# Patient Record
Sex: FEMALE | Race: OTHER | HISPANIC OR LATINO
[De-identification: names, ages, dates, MRNs, and addresses within clinical notes are randomized per-mention and may not be internally consistent; named-entity substitution may affect disease eponyms.]

---

## 2017-01-04 ENCOUNTER — APPOINTMENT (OUTPATIENT)
Dept: FAMILY MEDICINE | Facility: CLINIC | Age: 82
End: 2017-01-04

## 2017-01-04 VITALS
TEMPERATURE: 98.3 F | HEART RATE: 63 BPM | WEIGHT: 120 LBS | BODY MASS INDEX: 25.89 KG/M2 | OXYGEN SATURATION: 98 % | SYSTOLIC BLOOD PRESSURE: 179 MMHG | HEIGHT: 57 IN | DIASTOLIC BLOOD PRESSURE: 82 MMHG

## 2017-01-04 VITALS — SYSTOLIC BLOOD PRESSURE: 160 MMHG | DIASTOLIC BLOOD PRESSURE: 90 MMHG

## 2017-01-04 DIAGNOSIS — R41.82 ALTERED MENTAL STATUS, UNSPECIFIED: ICD-10-CM

## 2017-01-04 DIAGNOSIS — Z01.00 ENCOUNTER FOR EXAMINATION OF EYES AND VISION W/OUT ABNORMAL FINDINGS: ICD-10-CM

## 2017-01-04 DIAGNOSIS — Z23 ENCOUNTER FOR IMMUNIZATION: ICD-10-CM

## 2017-01-04 DIAGNOSIS — I24.9 ACUTE ISCHEMIC HEART DISEASE, UNSPECIFIED: ICD-10-CM

## 2017-01-04 DIAGNOSIS — K21.9 GASTRO-ESOPHAGEAL REFLUX DISEASE W/OUT ESOPHAGITIS: ICD-10-CM

## 2017-01-04 DIAGNOSIS — E55.9 VITAMIN D DEFICIENCY, UNSPECIFIED: ICD-10-CM

## 2017-01-04 DIAGNOSIS — Z09 ENCOUNTER FOR FOLLOW-UP EXAMINATION AFTER COMPLETED TREATMENT FOR CONDITIONS OTHER THAN MALIGNANT NEOPLASM: ICD-10-CM

## 2017-01-04 DIAGNOSIS — E13.10 OTHER SPECIFIED DIABETES MELLITUS WITH KETOACIDOSIS W/OUT COMA: ICD-10-CM

## 2017-01-04 RX ORDER — ASPIRIN 325 MG/1
325 TABLET, FILM COATED ORAL DAILY
Qty: 30 | Refills: 5 | Status: ACTIVE | COMMUNITY
Start: 2017-01-04 | End: 1900-01-01

## 2017-01-06 PROBLEM — E55.9 VITAMIN D DEFICIENCY: Status: ACTIVE | Noted: 2017-01-06

## 2017-01-06 LAB
25(OH)D3 SERPL-MCNC: 13.3 NG/ML
ALBUMIN SERPL ELPH-MCNC: 3.9 G/DL
ALP BLD-CCNC: 92 U/L
ALT SERPL-CCNC: 18 U/L
ANION GAP SERPL CALC-SCNC: 20 MMOL/L
APPEARANCE: CLEAR
AST SERPL-CCNC: 18 U/L
BACTERIA: ABNORMAL
BASOPHILS # BLD AUTO: 0.03 K/UL
BASOPHILS NFR BLD AUTO: 0.5 %
BILIRUB SERPL-MCNC: 0.2 MG/DL
BILIRUBIN URINE: ABNORMAL
BLOOD URINE: NEGATIVE
BUN SERPL-MCNC: 18 MG/DL
CALCIUM SERPL-MCNC: 9.8 MG/DL
CHLORIDE SERPL-SCNC: 102 MMOL/L
CO2 SERPL-SCNC: 20 MMOL/L
COLOR: YELLOW
CREAT SERPL-MCNC: 1 MG/DL
CREAT SPEC-SCNC: 144 MG/DL
EOSINOPHIL # BLD AUTO: 0.04 K/UL
EOSINOPHIL NFR BLD AUTO: 0.7 %
FERRITIN SERPL-MCNC: 24.7 NG/ML
FOLATE SERPL-MCNC: 18.3 NG/ML
GGT SERPL-CCNC: 29 U/L
GLUCOSE QUALITATIVE U: NORMAL
GLUCOSE SERPL-MCNC: 140 MG/DL
HBA1C MFR BLD HPLC: 8.9 %
HCT VFR BLD CALC: 33.4 %
HGB BLD-MCNC: 10.9 G/DL
HYALINE CASTS: 0 /LPF
IMM GRANULOCYTES NFR BLD AUTO: 0.3 %
IRON SATN MFR SERPL: 25 %
IRON SERPL-MCNC: 90 UG/DL
KETONES URINE: ABNORMAL
LEUKOCYTE ESTERASE URINE: ABNORMAL
LYMPHOCYTES # BLD AUTO: 1.42 K/UL
LYMPHOCYTES NFR BLD AUTO: 24.1 %
MAN DIFF?: NORMAL
MCHC RBC-ENTMCNC: 26.3 PG
MCHC RBC-ENTMCNC: 32.6 GM/DL
MCV RBC AUTO: 80.5 FL
MICROALBUMIN 24H UR DL<=1MG/L-MCNC: 2.1 MG/DL
MICROALBUMIN/CREAT 24H UR-RTO: 15 UG/MG
MICROSCOPIC-UA: NORMAL
MONOCYTES # BLD AUTO: 0.21 K/UL
MONOCYTES NFR BLD AUTO: 3.6 %
NEUTROPHILS # BLD AUTO: 4.18 K/UL
NEUTROPHILS NFR BLD AUTO: 70.8 %
NITRITE URINE: NEGATIVE
PH URINE: 6
PLATELET # BLD AUTO: 374 K/UL
POTASSIUM SERPL-SCNC: 4.8 MMOL/L
PROT SERPL-MCNC: 7 G/DL
PROTEIN URINE: NEGATIVE
RBC # BLD: 4.15 M/UL
RBC # FLD: 14.4 %
RED BLOOD CELLS URINE: 2 /HPF
SODIUM SERPL-SCNC: 142 MMOL/L
SPECIFIC GRAVITY URINE: 1.02
SQUAMOUS EPITHELIAL CELLS: 6 /HPF
T4 FREE SERPL-MCNC: 1.4 NG/DL
TIBC SERPL-MCNC: 360 UG/DL
TSH SERPL-ACNC: 0.52 UU/ML
UIBC SERPL-MCNC: 270 UG/DL
UROBILINOGEN URINE: NORMAL
VIT B12 SERPL-MCNC: 361 PG/ML
WBC # FLD AUTO: 5.9 K/UL
WHITE BLOOD CELLS URINE: 7 /HPF

## 2017-01-25 ENCOUNTER — APPOINTMENT (OUTPATIENT)
Dept: FAMILY MEDICINE | Facility: CLINIC | Age: 82
End: 2017-01-25

## 2017-02-01 ENCOUNTER — APPOINTMENT (OUTPATIENT)
Dept: FAMILY MEDICINE | Facility: CLINIC | Age: 82
End: 2017-02-01

## 2017-02-01 VITALS
BODY MASS INDEX: 26.1 KG/M2 | HEIGHT: 57 IN | DIASTOLIC BLOOD PRESSURE: 73 MMHG | HEART RATE: 78 BPM | SYSTOLIC BLOOD PRESSURE: 166 MMHG | WEIGHT: 121 LBS | OXYGEN SATURATION: 99 % | TEMPERATURE: 99.3 F

## 2017-02-01 DIAGNOSIS — E11.65 TYPE 2 DIABETES MELLITUS WITH HYPERGLYCEMIA: ICD-10-CM

## 2017-02-01 DIAGNOSIS — M19.90 UNSPECIFIED OSTEOARTHRITIS, UNSPECIFIED SITE: ICD-10-CM

## 2017-02-02 LAB
CHOLEST SERPL-MCNC: 132 MG/DL
CHOLEST/HDLC SERPL: 2.2 RATIO
HDLC SERPL-MCNC: 61 MG/DL
LDLC SERPL CALC-MCNC: 57 MG/DL
TRIGL SERPL-MCNC: 70 MG/DL

## 2017-04-13 ENCOUNTER — APPOINTMENT (OUTPATIENT)
Dept: FAMILY MEDICINE | Facility: CLINIC | Age: 82
End: 2017-04-13

## 2017-05-03 ENCOUNTER — LABORATORY RESULT (OUTPATIENT)
Age: 82
End: 2017-05-03

## 2017-05-03 ENCOUNTER — APPOINTMENT (OUTPATIENT)
Dept: FAMILY MEDICINE | Facility: CLINIC | Age: 82
End: 2017-05-03

## 2017-05-03 VITALS
HEIGHT: 57 IN | WEIGHT: 121 LBS | DIASTOLIC BLOOD PRESSURE: 82 MMHG | BODY MASS INDEX: 26.1 KG/M2 | TEMPERATURE: 98.3 F | HEART RATE: 61 BPM | OXYGEN SATURATION: 99 % | SYSTOLIC BLOOD PRESSURE: 189 MMHG

## 2017-05-03 DIAGNOSIS — Z09 ENCOUNTER FOR FOLLOW-UP EXAMINATION AFTER COMPLETED TREATMENT FOR CONDITIONS OTHER THAN MALIGNANT NEOPLASM: ICD-10-CM

## 2017-05-03 LAB — HBA1C MFR BLD HPLC: 8.1

## 2017-05-04 LAB
ANION GAP SERPL CALC-SCNC: 15 MMOL/L
BASOPHILS # BLD AUTO: 0.08 K/UL
BASOPHILS NFR BLD AUTO: 0.9 %
BUN SERPL-MCNC: 16 MG/DL
CALCIUM SERPL-MCNC: 9.5 MG/DL
CHLORIDE SERPL-SCNC: 101 MMOL/L
CO2 SERPL-SCNC: 25 MMOL/L
CREAT SERPL-MCNC: 0.68 MG/DL
EOSINOPHIL # BLD AUTO: 0.36 K/UL
EOSINOPHIL NFR BLD AUTO: 4.3 %
GLUCOSE SERPL-MCNC: 155 MG/DL
HCT VFR BLD CALC: 30.6 %
HGB BLD-MCNC: 9.3 G/DL
LYMPHOCYTES # BLD AUTO: 1.62 K/UL
LYMPHOCYTES NFR BLD AUTO: 19.1 %
MAN DIFF?: NORMAL
MCHC RBC-ENTMCNC: 21.9 PG
MCHC RBC-ENTMCNC: 30.4 GM/DL
MCV RBC AUTO: 72 FL
MONOCYTES # BLD AUTO: 0.3 K/UL
MONOCYTES NFR BLD AUTO: 3.5 %
NEUTROPHILS # BLD AUTO: 6.12 K/UL
NEUTROPHILS NFR BLD AUTO: 72.2 %
PLATELET # BLD AUTO: 441 K/UL
POTASSIUM SERPL-SCNC: 4.6 MMOL/L
RBC # BLD: 4.25 M/UL
RBC # FLD: NORMAL
SODIUM SERPL-SCNC: 141 MMOL/L
WBC # FLD AUTO: 8.48 K/UL

## 2017-05-05 ENCOUNTER — APPOINTMENT (OUTPATIENT)
Dept: FAMILY MEDICINE | Facility: CLINIC | Age: 82
End: 2017-05-05

## 2017-07-20 ENCOUNTER — EMERGENCY (EMERGENCY)
Facility: HOSPITAL | Age: 82
LOS: 1 days | Discharge: HOME | End: 2017-07-20
Attending: EMERGENCY MEDICINE | Admitting: EMERGENCY MEDICINE
Payer: MEDICARE

## 2017-07-20 VITALS
DIASTOLIC BLOOD PRESSURE: 81 MMHG | HEART RATE: 62 BPM | TEMPERATURE: 98 F | OXYGEN SATURATION: 100 % | HEIGHT: 62 IN | SYSTOLIC BLOOD PRESSURE: 189 MMHG | RESPIRATION RATE: 18 BRPM | WEIGHT: 130.07 LBS

## 2017-07-20 VITALS
OXYGEN SATURATION: 100 % | DIASTOLIC BLOOD PRESSURE: 80 MMHG | HEART RATE: 60 BPM | SYSTOLIC BLOOD PRESSURE: 184 MMHG | RESPIRATION RATE: 18 BRPM

## 2017-07-20 LAB
ALBUMIN SERPL ELPH-MCNC: 4.3 G/DL — SIGNIFICANT CHANGE UP (ref 3.3–5.2)
ALP SERPL-CCNC: 78 U/L — SIGNIFICANT CHANGE UP (ref 40–120)
ALT FLD-CCNC: 15 U/L — SIGNIFICANT CHANGE UP
ANION GAP SERPL CALC-SCNC: 15 MMOL/L — SIGNIFICANT CHANGE UP (ref 5–17)
APPEARANCE UR: CLEAR — SIGNIFICANT CHANGE UP
AST SERPL-CCNC: 24 U/L — SIGNIFICANT CHANGE UP
BILIRUB SERPL-MCNC: 0.1 MG/DL — LOW (ref 0.4–2)
BILIRUB UR-MCNC: NEGATIVE — SIGNIFICANT CHANGE UP
BUN SERPL-MCNC: 8 MG/DL — SIGNIFICANT CHANGE UP (ref 8–20)
CALCIUM SERPL-MCNC: 8.9 MG/DL — SIGNIFICANT CHANGE UP (ref 8.6–10.2)
CHLORIDE SERPL-SCNC: 105 MMOL/L — SIGNIFICANT CHANGE UP (ref 98–107)
CO2 SERPL-SCNC: 22 MMOL/L — SIGNIFICANT CHANGE UP (ref 22–29)
COLOR SPEC: YELLOW — SIGNIFICANT CHANGE UP
CREAT SERPL-MCNC: 0.66 MG/DL — SIGNIFICANT CHANGE UP (ref 0.5–1.3)
DIFF PNL FLD: NEGATIVE — SIGNIFICANT CHANGE UP
GLUCOSE SERPL-MCNC: 63 MG/DL — LOW (ref 70–115)
GLUCOSE UR QL: NEGATIVE MG/DL — SIGNIFICANT CHANGE UP
HCT VFR BLD CALC: 30.4 % — LOW (ref 37–47)
HGB BLD-MCNC: 9.9 G/DL — LOW (ref 12–16)
KETONES UR-MCNC: NEGATIVE — SIGNIFICANT CHANGE UP
LEUKOCYTE ESTERASE UR-ACNC: NEGATIVE — SIGNIFICANT CHANGE UP
MCHC RBC-ENTMCNC: 24.9 PG — LOW (ref 27–31)
MCHC RBC-ENTMCNC: 32.6 G/DL — SIGNIFICANT CHANGE UP (ref 32–36)
MCV RBC AUTO: 76.6 FL — LOW (ref 81–99)
NITRITE UR-MCNC: NEGATIVE — SIGNIFICANT CHANGE UP
PH UR: 6.5 — SIGNIFICANT CHANGE UP (ref 5–8)
PLATELET # BLD AUTO: 342 K/UL — SIGNIFICANT CHANGE UP (ref 150–400)
POTASSIUM SERPL-MCNC: 4 MMOL/L — SIGNIFICANT CHANGE UP (ref 3.5–5.3)
POTASSIUM SERPL-SCNC: 4 MMOL/L — SIGNIFICANT CHANGE UP (ref 3.5–5.3)
PROT SERPL-MCNC: 7.1 G/DL — SIGNIFICANT CHANGE UP (ref 6.6–8.7)
PROT UR-MCNC: NEGATIVE MG/DL — SIGNIFICANT CHANGE UP
RBC # BLD: 3.97 M/UL — LOW (ref 4.4–5.2)
RBC # FLD: 16.9 % — HIGH (ref 11–15.6)
SODIUM SERPL-SCNC: 142 MMOL/L — SIGNIFICANT CHANGE UP (ref 135–145)
SP GR SPEC: 1 — LOW (ref 1.01–1.02)
UROBILINOGEN FLD QL: NEGATIVE MG/DL — SIGNIFICANT CHANGE UP
WBC # BLD: 6.1 K/UL — SIGNIFICANT CHANGE UP (ref 4.8–10.8)
WBC # FLD AUTO: 6.1 K/UL — SIGNIFICANT CHANGE UP (ref 4.8–10.8)

## 2017-07-20 PROCEDURE — 99284 EMERGENCY DEPT VISIT MOD MDM: CPT

## 2017-07-20 PROCEDURE — 36415 COLL VENOUS BLD VENIPUNCTURE: CPT

## 2017-07-20 PROCEDURE — T1013: CPT

## 2017-07-20 PROCEDURE — 93010 ELECTROCARDIOGRAM REPORT: CPT

## 2017-07-20 PROCEDURE — 93005 ELECTROCARDIOGRAM TRACING: CPT

## 2017-07-20 PROCEDURE — 81003 URINALYSIS AUTO W/O SCOPE: CPT

## 2017-07-20 PROCEDURE — 85027 COMPLETE CBC AUTOMATED: CPT

## 2017-07-20 PROCEDURE — 80053 COMPREHEN METABOLIC PANEL: CPT

## 2017-07-20 PROCEDURE — 99283 EMERGENCY DEPT VISIT LOW MDM: CPT | Mod: 25

## 2017-07-20 RX ADMIN — Medication 0.1 MILLIGRAM(S): at 13:14

## 2017-07-20 NOTE — ED ADULT NURSE REASSESSMENT NOTE - NS ED NURSE REASSESS COMMENT FT1
pt in wheelchair  family at bedside  son states 'I am the only one who would know her medicine but she takes like 200 pills with all funky names and I don't know any of them'

## 2017-07-20 NOTE — ED STATDOCS - PROGRESS NOTE DETAILS
PA NOTE:  Pt seen by intake physician and HPI/ROS/PE/MDM reviewed. PT presenting to ED with complaints of...  PE: GEN: Awake, alert, interactive, NAD, non-toxic appearing. EYES: PERRL CARDIAC: Reg rate and rhythm, S1,S2, no murmur/rub/gallop. Strong central and peripheral pulses, Brisk cap refill, no evident pedal edema. RESP: No distress noted. L/S clear = Bilat without accessory muscle use, wheeze, rhonchi, rales. ABD: soft, supple, non-tender, no guarding. BS x 4, normoactive. NEURO: AOx3, CN II-XII grossly intact without focal deficit. MSK: Moving all extremities with no apparent deformities. SKIN: Warm, dry, normal color, without apparent rashes.  PLAN: Pt to be discharged to home. Advised DASH diet is recommended. Advised follow up with your PMD within 2 days for blood pressure check. Return to ED w headache, dizziness, chest pain, sob, palpitations or other worrisome symptoms. Explained w son at bedside. Expresses understanding and agreement with plan of discharge and follow up. PA NOTE:  Pt seen by intake physician and HPI/ROS/PE/MDM reviewed. PT presenting to ED with complaints of..elevated blood pressure. Pt has a home health aide who brought her in due to systolic pressure of 200. Pt normally takes Metoprolol daily. Pt feels well, denies pain or any symptoms.   PE: GEN: Awake, alert, interactive, NAD, non-toxic appearing. EYES: PERRL CARDIAC: Reg rate and rhythm, S1,S2, no murmur/rub/gallop. Strong central and peripheral pulses, Brisk cap refill, no evident pedal edema. RESP: No distress noted. L/S clear = Bilat without accessory muscle use, wheeze, rhonchi, rales. ABD: soft, supple, non-tender, no guarding. BS x 4, normoactive. NEURO: AOx3, CN II-XII grossly intact without focal deficit. MSK: Moving all extremities with no apparent deformities. SKIN: Warm, dry, normal color, without apparent rashes.  PLAN: Clonidine, EKG, labs, reassess Informed all results, informed mild anemia with no baseline labs. Printout given to give to PMD. Pt to be discharged to home. Advised DASH diet is recommended. Advised follow up with your PMD within 2 days for blood pressure check. Return to ED w headache, dizziness, chest pain, sob, palpitations or other worrisome symptoms. Explained w son at bedside. Expresses understanding and agreement with plan of discharge and follow up.

## 2017-07-20 NOTE — ED STATDOCS - CARE PLAN
Principal Discharge DX:	Hypertension  Goal:	to have well controlled BP  Instructions for follow-up, activity and diet:	DASH diet is recommended. Follow up with your PMD within 2 days for blood pressure check. Return to ED w headache, dizziness, chest pain, sob, palpitations or other worrisome symptoms.

## 2017-07-20 NOTE — ED STATDOCS - OBJECTIVE STATEMENT
81 y/o F PMHx DM presents to ED c/o HTN. Pt has a nurse for home care, nurse told her BP was over 200. Pt reports she feels well, sometimes she feels difficulty breathing. Pt is on Metoprolol, ASA, and a few more medications which she takes daily.  Denies N/V/D, fever, chills, SOB, CP, HA, numbness, tingling and abd pain.  PCP: Anitra Figueroa.

## 2017-07-20 NOTE — ED STATDOCS - PLAN OF CARE
to have well controlled BP DASH diet is recommended. Follow up with your PMD within 2 days for blood pressure check. Return to ED w headache, dizziness, chest pain, sob, palpitations or other worrisome symptoms.

## 2017-07-20 NOTE — ED ADULT NURSE REASSESSMENT NOTE - NS ED NURSE REASSESS COMMENT FT1
call out to pts MD  [phone]  son couldn't recall doctors name 'I have the number'     called to get med list.

## 2017-07-20 NOTE — ED ADULT TRIAGE NOTE - CHIEF COMPLAINT QUOTE
Visiting nurse stated blood pressure was high, denies any headache or dizziness, offers no complaints

## 2017-07-20 NOTE — ED STATDOCS - ATTENDING CONTRIBUTION TO CARE
I, Axel Carmichael, performed the initial face to face bedside interview with this patient regarding history of present illness, review of symptoms and relevant past medical, social and family history.  I completed an independent physical examination.  I was the initial provider who evaluated this patient. I have signed out the follow up of any pending tests (i.e. labs, radiological studies) to the ACP.  I have communicated the patient’s plan of care and disposition with the ACP.

## 2017-07-20 NOTE — ED STATDOCS - MEDICAL DECISION MAKING DETAILS
Will get basic labs, and re-assess BP. Will add additional medications as necessary and likely discharge given asymptomatic.

## 2017-07-26 ENCOUNTER — APPOINTMENT (OUTPATIENT)
Dept: FAMILY MEDICINE | Facility: CLINIC | Age: 82
End: 2017-07-26

## 2017-07-26 VITALS
HEART RATE: 62 BPM | WEIGHT: 125 LBS | OXYGEN SATURATION: 100 % | SYSTOLIC BLOOD PRESSURE: 160 MMHG | TEMPERATURE: 98 F | BODY MASS INDEX: 26.97 KG/M2 | DIASTOLIC BLOOD PRESSURE: 70 MMHG | HEIGHT: 57 IN

## 2017-07-26 VITALS — SYSTOLIC BLOOD PRESSURE: 130 MMHG | DIASTOLIC BLOOD PRESSURE: 80 MMHG

## 2017-07-26 DIAGNOSIS — I25.10 ATHEROSCLEROTIC HEART DISEASE OF NATIVE CORONARY ARTERY W/OUT ANGINA PECTORIS: ICD-10-CM

## 2017-07-26 DIAGNOSIS — D64.9 ANEMIA, UNSPECIFIED: ICD-10-CM

## 2017-07-26 LAB — HBA1C MFR BLD HPLC: 6.9

## 2017-07-26 RX ORDER — GLIPIZIDE 10 MG/1
10 TABLET ORAL
Qty: 60 | Refills: 5 | Status: DISCONTINUED | COMMUNITY
Start: 2017-01-04 | End: 2017-07-26

## 2017-08-11 ENCOUNTER — APPOINTMENT (OUTPATIENT)
Dept: FAMILY MEDICINE | Facility: CLINIC | Age: 82
End: 2017-08-11
Payer: MEDICARE

## 2017-08-11 VITALS
HEIGHT: 57 IN | DIASTOLIC BLOOD PRESSURE: 72 MMHG | BODY MASS INDEX: 27.18 KG/M2 | TEMPERATURE: 98.3 F | HEART RATE: 60 BPM | OXYGEN SATURATION: 100 % | WEIGHT: 126 LBS | SYSTOLIC BLOOD PRESSURE: 174 MMHG

## 2017-08-11 DIAGNOSIS — I10 ESSENTIAL (PRIMARY) HYPERTENSION: ICD-10-CM

## 2017-08-11 DIAGNOSIS — E11.9 TYPE 2 DIABETES MELLITUS W/OUT COMPLICATIONS: ICD-10-CM

## 2017-08-11 PROCEDURE — 99213 OFFICE O/P EST LOW 20 MIN: CPT

## 2017-08-30 ENCOUNTER — LABORATORY RESULT (OUTPATIENT)
Age: 82
End: 2017-08-30

## 2017-08-30 ENCOUNTER — APPOINTMENT (OUTPATIENT)
Dept: FAMILY MEDICINE | Facility: CLINIC | Age: 82
End: 2017-08-30
Payer: MEDICARE

## 2017-08-30 VITALS
HEART RATE: 60 BPM | OXYGEN SATURATION: 100 % | DIASTOLIC BLOOD PRESSURE: 73 MMHG | SYSTOLIC BLOOD PRESSURE: 191 MMHG | HEIGHT: 57 IN | BODY MASS INDEX: 26.32 KG/M2 | WEIGHT: 122 LBS | TEMPERATURE: 99 F

## 2017-08-30 DIAGNOSIS — Z00.00 ENCOUNTER FOR GENERAL ADULT MEDICAL EXAMINATION W/OUT ABNORMAL FINDINGS: ICD-10-CM

## 2017-08-30 PROCEDURE — 36415 COLL VENOUS BLD VENIPUNCTURE: CPT

## 2017-08-30 PROCEDURE — 99397 PER PM REEVAL EST PAT 65+ YR: CPT | Mod: 25

## 2017-09-01 LAB
25(OH)D3 SERPL-MCNC: 14 NG/ML
ALBUMIN SERPL ELPH-MCNC: 4.1 G/DL
ALP BLD-CCNC: 73 U/L
ALT SERPL-CCNC: 21 U/L
ANION GAP SERPL CALC-SCNC: 14 MMOL/L
APPEARANCE: CLEAR
AST SERPL-CCNC: 22 U/L
BASOPHILS # BLD AUTO: 0.03 K/UL
BASOPHILS NFR BLD AUTO: 0.7 %
BILIRUB SERPL-MCNC: <0.2 MG/DL
BILIRUBIN URINE: NEGATIVE
BLOOD URINE: NEGATIVE
BUN SERPL-MCNC: 14 MG/DL
CALCIUM SERPL-MCNC: 9 MG/DL
CHLORIDE SERPL-SCNC: 103 MMOL/L
CHOLEST SERPL-MCNC: 126 MG/DL
CHOLEST/HDLC SERPL: 3.1 RATIO
CO2 SERPL-SCNC: 23 MMOL/L
COLOR: YELLOW
CREAT SERPL-MCNC: 0.77 MG/DL
CREAT SPEC-SCNC: 111 MG/DL
EOSINOPHIL # BLD AUTO: 0.09 K/UL
EOSINOPHIL NFR BLD AUTO: 2 %
GLUCOSE QUALITATIVE U: NORMAL MG/DL
GLUCOSE SERPL-MCNC: 166 MG/DL
HCT VFR BLD CALC: 27 %
HDLC SERPL-MCNC: 41 MG/DL
HGB BLD-MCNC: 8.5 G/DL
IMM GRANULOCYTES NFR BLD AUTO: 0.2 %
KETONES URINE: NEGATIVE
LDLC SERPL CALC-MCNC: 44 MG/DL
LEUKOCYTE ESTERASE URINE: NEGATIVE
LYMPHOCYTES # BLD AUTO: 1.12 K/UL
LYMPHOCYTES NFR BLD AUTO: 24.8 %
MAN DIFF?: NORMAL
MCHC RBC-ENTMCNC: 23.1 PG
MCHC RBC-ENTMCNC: 31.5 GM/DL
MCV RBC AUTO: 73.4 FL
MICROALBUMIN 24H UR DL<=1MG/L-MCNC: 1.9 MG/DL
MICROALBUMIN/CREAT 24H UR-RTO: 17 MG/G
MONOCYTES # BLD AUTO: 0.27 K/UL
MONOCYTES NFR BLD AUTO: 6 %
NEUTROPHILS # BLD AUTO: 2.99 K/UL
NEUTROPHILS NFR BLD AUTO: 66.3 %
NITRITE URINE: NEGATIVE
PH URINE: 7.5
PLATELET # BLD AUTO: 380 K/UL
POTASSIUM SERPL-SCNC: 4.5 MMOL/L
PROT SERPL-MCNC: 7.3 G/DL
PROTEIN URINE: NEGATIVE MG/DL
RBC # BLD: 3.68 M/UL
RBC # FLD: 17.8 %
SODIUM SERPL-SCNC: 140 MMOL/L
SPECIFIC GRAVITY URINE: 1.01
TRIGL SERPL-MCNC: 206 MG/DL
TSH SERPL-ACNC: 0.65 UIU/ML
UROBILINOGEN URINE: NORMAL MG/DL
WBC # FLD AUTO: 4.51 K/UL

## 2017-09-11 ENCOUNTER — RX RENEWAL (OUTPATIENT)
Age: 82
End: 2017-09-11

## 2017-09-11 RX ORDER — CLOPIDOGREL BISULFATE 75 MG/1
75 TABLET, FILM COATED ORAL DAILY
Qty: 30 | Refills: 3 | Status: ACTIVE | COMMUNITY
Start: 2017-01-04 | End: 1900-01-01

## 2017-09-11 RX ORDER — LOSARTAN POTASSIUM 50 MG/1
50 TABLET, FILM COATED ORAL DAILY
Qty: 30 | Refills: 5 | Status: ACTIVE | COMMUNITY
Start: 2017-01-04 | End: 1900-01-01

## 2017-09-11 RX ORDER — METOPROLOL TARTRATE 25 MG/1
25 TABLET, FILM COATED ORAL
Qty: 60 | Refills: 5 | Status: ACTIVE | COMMUNITY
Start: 2017-01-04 | End: 1900-01-01

## 2017-09-11 RX ORDER — ATORVASTATIN CALCIUM 20 MG/1
20 TABLET, FILM COATED ORAL DAILY
Qty: 30 | Refills: 5 | Status: ACTIVE | COMMUNITY
Start: 2017-01-04 | End: 1900-01-01

## 2017-09-11 RX ORDER — ERGOCALCIFEROL 1.25 MG/1
1.25 MG CAPSULE, LIQUID FILLED ORAL
Qty: 12 | Refills: 0 | Status: ACTIVE | COMMUNITY
Start: 2017-01-06 | End: 1900-01-01

## 2017-10-04 ENCOUNTER — APPOINTMENT (OUTPATIENT)
Dept: FAMILY MEDICINE | Facility: CLINIC | Age: 82
End: 2017-10-04

## 2017-11-25 ENCOUNTER — INPATIENT (INPATIENT)
Facility: HOSPITAL | Age: 82
LOS: 4 days | Discharge: HOME | DRG: 65 | End: 2017-11-30
Attending: INTERNAL MEDICINE | Admitting: INTERNAL MEDICINE
Payer: MEDICARE

## 2017-11-25 VITALS
TEMPERATURE: 98 F | RESPIRATION RATE: 16 BRPM | SYSTOLIC BLOOD PRESSURE: 216 MMHG | DIASTOLIC BLOOD PRESSURE: 91 MMHG | HEIGHT: 66 IN | OXYGEN SATURATION: 100 % | HEART RATE: 61 BPM | WEIGHT: 175.05 LBS

## 2017-11-25 DIAGNOSIS — S06.369A TRAUMATIC HEMORRHAGE OF CEREBRUM, UNSPECIFIED, WITH LOSS OF CONSCIOUSNESS OF UNSPECIFIED DURATION, INITIAL ENCOUNTER: ICD-10-CM

## 2017-11-25 DIAGNOSIS — E11.9 TYPE 2 DIABETES MELLITUS WITHOUT COMPLICATIONS: ICD-10-CM

## 2017-11-25 DIAGNOSIS — I62.9 NONTRAUMATIC INTRACRANIAL HEMORRHAGE, UNSPECIFIED: ICD-10-CM

## 2017-11-25 DIAGNOSIS — I10 ESSENTIAL (PRIMARY) HYPERTENSION: ICD-10-CM

## 2017-11-25 LAB
ANION GAP SERPL CALC-SCNC: 14 MMOL/L — SIGNIFICANT CHANGE UP (ref 5–17)
ANISOCYTOSIS BLD QL: SLIGHT — SIGNIFICANT CHANGE UP
APTT BLD: 29.5 SEC — SIGNIFICANT CHANGE UP (ref 27.5–37.4)
BASOPHILS # BLD AUTO: 0 K/UL — SIGNIFICANT CHANGE UP (ref 0–0.2)
BASOPHILS NFR BLD AUTO: 0.2 % — SIGNIFICANT CHANGE UP (ref 0–2)
BLD GP AB SCN SERPL QL: SIGNIFICANT CHANGE UP
BUN SERPL-MCNC: 20 MG/DL — SIGNIFICANT CHANGE UP (ref 8–20)
CALCIUM SERPL-MCNC: 9.5 MG/DL — SIGNIFICANT CHANGE UP (ref 8.6–10.2)
CHLORIDE SERPL-SCNC: 96 MMOL/L — LOW (ref 98–107)
CK SERPL-CCNC: 136 U/L — SIGNIFICANT CHANGE UP (ref 25–170)
CO2 SERPL-SCNC: 26 MMOL/L — SIGNIFICANT CHANGE UP (ref 22–29)
CREAT SERPL-MCNC: 0.52 MG/DL — SIGNIFICANT CHANGE UP (ref 0.5–1.3)
EOSINOPHIL # BLD AUTO: 0.1 K/UL — SIGNIFICANT CHANGE UP (ref 0–0.5)
EOSINOPHIL NFR BLD AUTO: 0.9 % — SIGNIFICANT CHANGE UP (ref 0–6)
GLUCOSE BLDC GLUCOMTR-MCNC: 146 MG/DL — HIGH (ref 70–99)
GLUCOSE BLDC GLUCOMTR-MCNC: 262 MG/DL — HIGH (ref 70–99)
GLUCOSE SERPL-MCNC: 266 MG/DL — HIGH (ref 70–115)
HCT VFR BLD CALC: 36.2 % — LOW (ref 37–47)
HGB BLD-MCNC: 12.6 G/DL — SIGNIFICANT CHANGE UP (ref 12–16)
HYPOCHROMIA BLD QL: SLIGHT — SIGNIFICANT CHANGE UP
INR BLD: 1.08 RATIO — SIGNIFICANT CHANGE UP (ref 0.88–1.16)
LYMPHOCYTES # BLD AUTO: 1.3 K/UL — SIGNIFICANT CHANGE UP (ref 1–4.8)
LYMPHOCYTES # BLD AUTO: 20.6 % — SIGNIFICANT CHANGE UP (ref 20–55)
MAGNESIUM SERPL-MCNC: 1.5 MG/DL — LOW (ref 1.6–2.6)
MCHC RBC-ENTMCNC: 26.3 PG — LOW (ref 27–31)
MCHC RBC-ENTMCNC: 34.8 G/DL — SIGNIFICANT CHANGE UP (ref 32–36)
MCV RBC AUTO: 75.4 FL — LOW (ref 81–99)
MICROCYTES BLD QL: SLIGHT — SIGNIFICANT CHANGE UP
MONOCYTES # BLD AUTO: 0.4 K/UL — SIGNIFICANT CHANGE UP (ref 0–0.8)
MONOCYTES NFR BLD AUTO: 5.4 % — SIGNIFICANT CHANGE UP (ref 3–10)
NEUTROPHILS # BLD AUTO: 4.7 K/UL — SIGNIFICANT CHANGE UP (ref 1.8–8)
NEUTROPHILS NFR BLD AUTO: 72.7 % — SIGNIFICANT CHANGE UP (ref 37–73)
PHOSPHATE SERPL-MCNC: 2.7 MG/DL — SIGNIFICANT CHANGE UP (ref 2.4–4.7)
PLAT MORPH BLD: NORMAL — SIGNIFICANT CHANGE UP
PLATELET # BLD AUTO: 260 K/UL — SIGNIFICANT CHANGE UP (ref 150–400)
POIKILOCYTOSIS BLD QL AUTO: SLIGHT — SIGNIFICANT CHANGE UP
POTASSIUM SERPL-MCNC: 4.4 MMOL/L — SIGNIFICANT CHANGE UP (ref 3.5–5.3)
POTASSIUM SERPL-SCNC: 4.4 MMOL/L — SIGNIFICANT CHANGE UP (ref 3.5–5.3)
PROTHROM AB SERPL-ACNC: 11.9 SEC — SIGNIFICANT CHANGE UP (ref 9.8–12.7)
RBC # BLD: 4.8 M/UL — SIGNIFICANT CHANGE UP (ref 4.4–5.2)
RBC # FLD: 20.1 % — HIGH (ref 11–15.6)
RBC BLD AUTO: ABNORMAL
SODIUM SERPL-SCNC: 136 MMOL/L — SIGNIFICANT CHANGE UP (ref 135–145)
TARGETS BLD QL SMEAR: SLIGHT — SIGNIFICANT CHANGE UP
TROPONIN T SERPL-MCNC: <0.01 NG/ML — SIGNIFICANT CHANGE UP (ref 0–0.06)
TYPE + AB SCN PNL BLD: SIGNIFICANT CHANGE UP
WBC # BLD: 6.5 K/UL — SIGNIFICANT CHANGE UP (ref 4.8–10.8)
WBC # FLD AUTO: 6.5 K/UL — SIGNIFICANT CHANGE UP (ref 4.8–10.8)

## 2017-11-25 PROCEDURE — 99231 SBSQ HOSP IP/OBS SF/LOW 25: CPT

## 2017-11-25 PROCEDURE — 99291 CRITICAL CARE FIRST HOUR: CPT

## 2017-11-25 PROCEDURE — 71010: CPT | Mod: 26

## 2017-11-25 PROCEDURE — 72125 CT NECK SPINE W/O DYE: CPT | Mod: 26

## 2017-11-25 PROCEDURE — 70450 CT HEAD/BRAIN W/O DYE: CPT | Mod: 26,77

## 2017-11-25 PROCEDURE — 70450 CT HEAD/BRAIN W/O DYE: CPT | Mod: 26

## 2017-11-25 PROCEDURE — 93010 ELECTROCARDIOGRAM REPORT: CPT

## 2017-11-25 RX ORDER — SODIUM CHLORIDE 9 MG/ML
1000 INJECTION, SOLUTION INTRAVENOUS
Qty: 0 | Refills: 0 | Status: DISCONTINUED | OUTPATIENT
Start: 2017-11-25 | End: 2017-11-26

## 2017-11-25 RX ORDER — DEXTROSE 50 % IN WATER 50 %
12.5 SYRINGE (ML) INTRAVENOUS ONCE
Qty: 0 | Refills: 0 | Status: DISCONTINUED | OUTPATIENT
Start: 2017-11-25 | End: 2017-11-26

## 2017-11-25 RX ORDER — ACETAMINOPHEN 500 MG
650 TABLET ORAL EVERY 6 HOURS
Qty: 0 | Refills: 0 | Status: DISCONTINUED | OUTPATIENT
Start: 2017-11-25 | End: 2017-11-30

## 2017-11-25 RX ORDER — MAGNESIUM SULFATE 500 MG/ML
2 VIAL (ML) INJECTION ONCE
Qty: 0 | Refills: 0 | Status: COMPLETED | OUTPATIENT
Start: 2017-11-25 | End: 2017-11-25

## 2017-11-25 RX ORDER — INSULIN LISPRO 100/ML
VIAL (ML) SUBCUTANEOUS EVERY 6 HOURS
Qty: 0 | Refills: 0 | Status: DISCONTINUED | OUTPATIENT
Start: 2017-11-25 | End: 2017-11-26

## 2017-11-25 RX ORDER — NICARDIPINE HYDROCHLORIDE 30 MG/1
2.5 CAPSULE, EXTENDED RELEASE ORAL
Qty: 40 | Refills: 0 | Status: DISCONTINUED | OUTPATIENT
Start: 2017-11-25 | End: 2017-11-26

## 2017-11-25 RX ORDER — LABETALOL HCL 100 MG
20 TABLET ORAL ONCE
Qty: 0 | Refills: 0 | Status: DISCONTINUED | OUTPATIENT
Start: 2017-11-25 | End: 2017-11-25

## 2017-11-25 RX ORDER — GLUCAGON INJECTION, SOLUTION 0.5 MG/.1ML
1 INJECTION, SOLUTION SUBCUTANEOUS ONCE
Qty: 0 | Refills: 0 | Status: DISCONTINUED | OUTPATIENT
Start: 2017-11-25 | End: 2017-11-26

## 2017-11-25 RX ORDER — DEXTROSE 50 % IN WATER 50 %
1 SYRINGE (ML) INTRAVENOUS ONCE
Qty: 0 | Refills: 0 | Status: DISCONTINUED | OUTPATIENT
Start: 2017-11-25 | End: 2017-11-26

## 2017-11-25 RX ORDER — SODIUM CHLORIDE 9 MG/ML
1000 INJECTION INTRAMUSCULAR; INTRAVENOUS; SUBCUTANEOUS
Qty: 0 | Refills: 0 | Status: DISCONTINUED | OUTPATIENT
Start: 2017-11-25 | End: 2017-11-26

## 2017-11-25 RX ORDER — PANTOPRAZOLE SODIUM 20 MG/1
40 TABLET, DELAYED RELEASE ORAL DAILY
Qty: 0 | Refills: 0 | Status: DISCONTINUED | OUTPATIENT
Start: 2017-11-25 | End: 2017-11-29

## 2017-11-25 RX ORDER — ATORVASTATIN CALCIUM 80 MG/1
80 TABLET, FILM COATED ORAL AT BEDTIME
Qty: 0 | Refills: 0 | Status: DISCONTINUED | OUTPATIENT
Start: 2017-11-25 | End: 2017-11-30

## 2017-11-25 RX ADMIN — NICARDIPINE HYDROCHLORIDE 12.5 MG/HR: 30 CAPSULE, EXTENDED RELEASE ORAL at 14:12

## 2017-11-25 RX ADMIN — PANTOPRAZOLE SODIUM 40 MILLIGRAM(S): 20 TABLET, DELAYED RELEASE ORAL at 17:31

## 2017-11-25 RX ADMIN — Medication 3: at 17:31

## 2017-11-25 RX ADMIN — SODIUM CHLORIDE 75 MILLILITER(S): 9 INJECTION INTRAMUSCULAR; INTRAVENOUS; SUBCUTANEOUS at 17:31

## 2017-11-25 RX ADMIN — ATORVASTATIN CALCIUM 80 MILLIGRAM(S): 80 TABLET, FILM COATED ORAL at 23:35

## 2017-11-25 RX ADMIN — Medication 50 GRAM(S): at 17:08

## 2017-11-25 RX ADMIN — NICARDIPINE HYDROCHLORIDE 12.5 MG/HR: 30 CAPSULE, EXTENDED RELEASE ORAL at 12:26

## 2017-11-25 NOTE — CONSULT NOTE ADULT - ASSESSMENT
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  - D/w Dr. 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  - D/w Dr. Garcia  - Admit to MICU  - Repeat CT head 6 hours from original scan (~17:30), will follow up on results. 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  - D/w Dr. Garcia  - Admit to MICU  - Repeat CT head 6 hours from original scan (~17:30), will follow up on results. 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  - D/w Dr. Garcia  - Admit to MICU  - Repeat CT head 6 hours from original scan (~17:30), will follow up on results. Low threshold for earlier scan should patient mental status change/clinically decompensate  - Hold anticoagulation/antiplatelet therapy  - BP control, SBP goal < 150  - HOB 30 degrees  - Q1 neuro checks  - PT/OT/Speech/PM&R  - Supportive care per MICU

## 2017-11-25 NOTE — ED ADULT NURSE NOTE - CHIEF COMPLAINT QUOTE
Patient's aid called EMS because when she knocked on the door, pt did not answer. Pt normally ambulatory but presents today with right hemiparesis and facial asymmetry. Pt found on ground upon EMS arrival. Alert and oriented x2. MD at bedside. Last seen at baseline mental status around 10pm last night. No external signs of head trauma present.

## 2017-11-25 NOTE — H&P ADULT - ASSESSMENT
82 year old female with HTN, DM II, found altered by son at home CT reveals ICH, pt placed on Cardene gtts and admitted to MICU.

## 2017-11-25 NOTE — H&P ADULT - HISTORY OF PRESENT ILLNESS
81 y/o female found down earlier today by son found with left basal ganglia hemorrhage with intraventricular extension. Per son patient stated she fell earlier today. At baseline patient lives alone and is independent with her activities of daily living. Last seen baseline ~ 10 PM last night. Patient is able to verbalize her name but is now expressively aphasic and may be mildly receptively aphasic, not making sense in [place]ian when asked place/time and intermittently following some simple commands. Takes ASA/Plavix. ROS unable to obtain as patient aphasic. 81 y/o female found down earlier today by son found with left basal ganglia hemorrhage with intraventricular extension. Per son patient stated she fell earlier today. At baseline patient lives alone and is independent with her activities of daily living. Last seen baseline ~ 10 PM last night. Patient is able to verbalize her name but is now expressively aphasic and may be mildly receptively aphasic, not making sense in [place] when asked place/time and intermittently following some simple commands. Takes ASA/Plavix.

## 2017-11-25 NOTE — ED PROVIDER NOTE - CARE PLAN
Principal Discharge DX:	Intracranial hematoma Principal Discharge DX:	Intracranial hematoma  Secondary Diagnosis:	Hemiparesis  Secondary Diagnosis:	Hypertensive emergency

## 2017-11-25 NOTE — H&P ADULT - PROBLEM SELECTOR PLAN 1
likely hypertensive BG hemorrhage with right hemiplegia   continue CVA protocol, neuros q1   repeat CT head this evening or sooner if change in mental status  BP control with Cardene  neurosurgery team following

## 2017-11-25 NOTE — CONSULT NOTE ADULT - SUBJECTIVE AND OBJECTIVE BOX
HISTORY OF PRESENT ILLNESS:   83 y/o female found down earlier today by son found with left basal ganglia hemorrhage with intraventricular extension. Per son patient stated she fell earlier today. At baseline patient lives alone and is independent with her activities of daily living. Last seen baseline ~ 10 PM last night. Patient is able to verbalize her name but is now expressively aphasic and may be mildly receptively aphasic, not making sense in Palestinian when asked place/time and intermittently following some simple commands. Takes ASA/Plavix. ROS unable to obtain as patient aphasic.    SBP upon arrival 200s, at time of assessment in 150s    PAST MEDICAL & SURGICAL HISTORY:  GERD (gastroesophageal reflux disease)  Diabetes mellitus  High cholesterol  Hypertension    Allergies    No Known Allergies    Intolerances    REVIEW OF SYSTEMS unable to obtain    MEDICATIONS:    OTHER:  niCARdipine Infusion 2.5 mG/Hr IV Continuous <Continuous>      Vital Signs Last 24 Hrs  T(C): 37.4 (25 Nov 2017 13:45), Max: 37.4 (25 Nov 2017 13:45)  T(F): 99.3 (25 Nov 2017 13:45), Max: 99.3 (25 Nov 2017 13:45)  HR: 80 (25 Nov 2017 14:00) (58 - 91)  BP: 142/66 (25 Nov 2017 14:00) (142/66 - 220/106)  BP(mean): 95 (25 Nov 2017 14:00) (95 - 100)  RR: 13 (25 Nov 2017 14:00) (13 - 18)  SpO2: 100% (25 Nov 2017 14:00) (100% - 100%)    PHYSICAL EXAM:  GENERAL: NAD, thin  HEAD:  Atraumatic, normocephalic  EYES: Conjunctiva and sclera clear  NECK: Supple  NEURO: AAO x1; Lethargic, opens eyes to loud voice and touch; + aphasia; following simple commands; PERRL ~3 mm; EOMI; +R facial droop; LUE/LLE 4/5, RUE at least 3/5, RLE withdraws to noxious  CHEST/LUNG: Clear to auscultation bilaterally  HEART: +S1/+S2; Regular rate and rhythm.  ABDOMEN: Soft, nontender, nondistended  SKIN: Warm, dry    LABS:                        12.6   6.5   )-----------( 260      ( 25 Nov 2017 11:26 )             36.2     11-25    136  |  96<L>  |  20.0  ----------------------------<  266<H>  4.4   |  26.0  |  0.52    Ca    9.5      25 Nov 2017 11:25  Phos  2.7     11-25  Mg     1.5     11-25      PT/INR - ( 25 Nov 2017 11:25 )   PT: 11.9 sec;   INR: 1.08 ratio         PTT - ( 25 Nov 2017 11:25 )  PTT:29.5 sec    RADIOLOGY & ADDITIONAL STUDIES:  - from: CT Head No Cont (11.25.17 @ 11:37)  IMPRESSION:    Acute left basal ganglia hemorrhage with intraventricular extension   without midline shift.

## 2017-11-25 NOTE — ED PROVIDER NOTE - OBJECTIVE STATEMENT
pt found on floor by son with right hemiparesis confused does her adl normally difficult to obtain history 2/2 to confusion

## 2017-11-25 NOTE — ED ADULT NURSE NOTE - OBJECTIVE STATEMENT
Assumed patient care at 1200.  Pt is awake and confused, able to follow commands, poor historian.  Right sided weakness, withdraws from pain. Dr. Rocha at bedside.  Pt denies any c/o pain or discomfort at this time.

## 2017-11-25 NOTE — ED ADULT TRIAGE NOTE - CHIEF COMPLAINT QUOTE
Patient's aid called EMS because when she knocked on the door, pt did not answer. Pt normally ambulatory but presents today with right hemiparesis and facial asymmetry. Pt found on ground upon EMS arrival. Alert and oriented x2. MD at bedside. Last seen at baseline mental status around 10pm last night. Patient's aid called EMS because when she knocked on the door, pt did not answer. Pt normally ambulatory but presents today with right hemiparesis and facial asymmetry. Pt found on ground upon EMS arrival. Alert and oriented x2. MD at bedside. Last seen at baseline mental status around 10pm last night. No external signs of head trauma present.

## 2017-11-26 DIAGNOSIS — I10 ESSENTIAL (PRIMARY) HYPERTENSION: ICD-10-CM

## 2017-11-26 LAB
ANION GAP SERPL CALC-SCNC: 14 MMOL/L — SIGNIFICANT CHANGE UP (ref 5–17)
BUN SERPL-MCNC: 13 MG/DL — SIGNIFICANT CHANGE UP (ref 8–20)
CALCIUM SERPL-MCNC: 8.8 MG/DL — SIGNIFICANT CHANGE UP (ref 8.6–10.2)
CHLORIDE SERPL-SCNC: 102 MMOL/L — SIGNIFICANT CHANGE UP (ref 98–107)
CHOLEST SERPL-MCNC: 111 MG/DL — SIGNIFICANT CHANGE UP (ref 110–199)
CO2 SERPL-SCNC: 24 MMOL/L — SIGNIFICANT CHANGE UP (ref 22–29)
CREAT SERPL-MCNC: 0.48 MG/DL — LOW (ref 0.5–1.3)
GLUCOSE BLDC GLUCOMTR-MCNC: 168 MG/DL — HIGH (ref 70–99)
GLUCOSE BLDC GLUCOMTR-MCNC: 193 MG/DL — HIGH (ref 70–99)
GLUCOSE BLDC GLUCOMTR-MCNC: 266 MG/DL — HIGH (ref 70–99)
GLUCOSE BLDC GLUCOMTR-MCNC: 302 MG/DL — HIGH (ref 70–99)
GLUCOSE SERPL-MCNC: 179 MG/DL — HIGH (ref 70–115)
HBA1C BLD-MCNC: 10.3 % — HIGH (ref 4–5.6)
HCT VFR BLD CALC: 33.1 % — LOW (ref 37–47)
HDLC SERPL-MCNC: 68 MG/DL — SIGNIFICANT CHANGE UP
HGB BLD-MCNC: 11.3 G/DL — LOW (ref 12–16)
LIPID PNL WITH DIRECT LDL SERPL: 31 MG/DL — SIGNIFICANT CHANGE UP
MAGNESIUM SERPL-MCNC: 2 MG/DL — SIGNIFICANT CHANGE UP (ref 1.6–2.6)
MCHC RBC-ENTMCNC: 25.4 PG — LOW (ref 27–31)
MCHC RBC-ENTMCNC: 34.1 G/DL — SIGNIFICANT CHANGE UP (ref 32–36)
MCV RBC AUTO: 74.4 FL — LOW (ref 81–99)
PHOSPHATE SERPL-MCNC: 2.9 MG/DL — SIGNIFICANT CHANGE UP (ref 2.4–4.7)
PLATELET # BLD AUTO: 260 K/UL — SIGNIFICANT CHANGE UP (ref 150–400)
POTASSIUM SERPL-MCNC: 3.7 MMOL/L — SIGNIFICANT CHANGE UP (ref 3.5–5.3)
POTASSIUM SERPL-SCNC: 3.7 MMOL/L — SIGNIFICANT CHANGE UP (ref 3.5–5.3)
RBC # BLD: 4.45 M/UL — SIGNIFICANT CHANGE UP (ref 4.4–5.2)
RBC # FLD: 19.9 % — HIGH (ref 11–15.6)
SODIUM SERPL-SCNC: 140 MMOL/L — SIGNIFICANT CHANGE UP (ref 135–145)
TOTAL CHOLESTEROL/HDL RATIO MEASUREMENT: 2 RATIO — LOW (ref 3.3–7.1)
TRIGL SERPL-MCNC: 58 MG/DL — SIGNIFICANT CHANGE UP (ref 10–200)
WBC # BLD: 7.1 K/UL — SIGNIFICANT CHANGE UP (ref 4.8–10.8)
WBC # FLD AUTO: 7.1 K/UL — SIGNIFICANT CHANGE UP (ref 4.8–10.8)

## 2017-11-26 PROCEDURE — 99233 SBSQ HOSP IP/OBS HIGH 50: CPT

## 2017-11-26 PROCEDURE — 99232 SBSQ HOSP IP/OBS MODERATE 35: CPT

## 2017-11-26 RX ORDER — HYDRALAZINE HCL 50 MG
10 TABLET ORAL EVERY 4 HOURS
Qty: 0 | Refills: 0 | Status: DISCONTINUED | OUTPATIENT
Start: 2017-11-26 | End: 2017-11-30

## 2017-11-26 RX ORDER — METOPROLOL TARTRATE 50 MG
25 TABLET ORAL
Qty: 0 | Refills: 0 | Status: DISCONTINUED | OUTPATIENT
Start: 2017-11-26 | End: 2017-11-27

## 2017-11-26 RX ORDER — INSULIN GLARGINE 100 [IU]/ML
10 INJECTION, SOLUTION SUBCUTANEOUS AT BEDTIME
Qty: 0 | Refills: 0 | Status: DISCONTINUED | OUTPATIENT
Start: 2017-11-26 | End: 2017-11-28

## 2017-11-26 RX ORDER — INSULIN LISPRO 100/ML
VIAL (ML) SUBCUTANEOUS
Qty: 0 | Refills: 0 | Status: DISCONTINUED | OUTPATIENT
Start: 2017-11-26 | End: 2017-11-30

## 2017-11-26 RX ORDER — LOSARTAN POTASSIUM 100 MG/1
25 TABLET, FILM COATED ORAL DAILY
Qty: 0 | Refills: 0 | Status: DISCONTINUED | OUTPATIENT
Start: 2017-11-26 | End: 2017-11-27

## 2017-11-26 RX ORDER — CLOPIDOGREL BISULFATE 75 MG/1
75 TABLET, FILM COATED ORAL DAILY
Qty: 0 | Refills: 0 | Status: DISCONTINUED | OUTPATIENT
Start: 2017-11-26 | End: 2017-11-26

## 2017-11-26 RX ORDER — INSULIN LISPRO 100/ML
VIAL (ML) SUBCUTANEOUS
Qty: 0 | Refills: 0 | Status: DISCONTINUED | OUTPATIENT
Start: 2017-11-26 | End: 2017-11-26

## 2017-11-26 RX ORDER — NICARDIPINE HYDROCHLORIDE 30 MG/1
2.5 CAPSULE, EXTENDED RELEASE ORAL
Qty: 40 | Refills: 0 | Status: DISCONTINUED | OUTPATIENT
Start: 2017-11-26 | End: 2017-11-26

## 2017-11-26 RX ADMIN — Medication 10 MILLIGRAM(S): at 17:08

## 2017-11-26 RX ADMIN — INSULIN GLARGINE 10 UNIT(S): 100 INJECTION, SOLUTION SUBCUTANEOUS at 22:50

## 2017-11-26 RX ADMIN — ATORVASTATIN CALCIUM 80 MILLIGRAM(S): 80 TABLET, FILM COATED ORAL at 21:53

## 2017-11-26 RX ADMIN — Medication 8: at 17:21

## 2017-11-26 RX ADMIN — Medication 0.2 MILLIGRAM(S): at 11:24

## 2017-11-26 RX ADMIN — Medication 1: at 06:08

## 2017-11-26 RX ADMIN — LOSARTAN POTASSIUM 25 MILLIGRAM(S): 100 TABLET, FILM COATED ORAL at 11:24

## 2017-11-26 RX ADMIN — Medication 25 MILLIGRAM(S): at 17:08

## 2017-11-26 RX ADMIN — Medication 10 MILLIGRAM(S): at 21:30

## 2017-11-26 RX ADMIN — Medication 2: at 22:47

## 2017-11-26 RX ADMIN — PANTOPRAZOLE SODIUM 40 MILLIGRAM(S): 20 TABLET, DELAYED RELEASE ORAL at 11:25

## 2017-11-26 RX ADMIN — Medication 6: at 11:25

## 2017-11-26 NOTE — PROGRESS NOTE ADULT - SUBJECTIVE AND OBJECTIVE BOX
INTERVAL HPI/OVERNIGHT EVENTS:  82y Female with left basal ganglia hemorrhage with intraventricular extension, likely hypertensive in nature. Patient seen lying comfortably in bed, improved exam today. Repeat CT head last night stable.    Vital Signs Last 24 Hrs  T(C): 36.7 (25 Nov 2017 16:32), Max: 37.4 (25 Nov 2017 13:45)  T(F): 98.1 (25 Nov 2017 16:32), Max: 99.3 (25 Nov 2017 13:45)  HR: 82 (26 Nov 2017 09:18) (58 - 91)  BP: 147/65 (26 Nov 2017 09:18) (129/60 - 220/106)  BP(mean): 93 (26 Nov 2017 09:18) (86 - 115)  RR: 18 (26 Nov 2017 09:18) (13 - 36)  SpO2: 99% (26 Nov 2017 09:18) (97% - 100%)    PHYSICAL EXAM:  GENERAL: NAD  HEAD:  Atraumatic, normocephalic  MENTAL STATUS: AAO x2- to self and place only; Awake; Opens eyes spontaneously; Making more sense today while speaking German; following simple commands  CRANIAL NERVES: PERRL ~3mm; EOMI;+right facial droop; facial sensation grossly intact to light touch b/l;  tongue midline  MOTOR: strength 4/5 LUE, 4-/5 LLE. + right sided neglect but with repeated verbal stimulation RUE/RLE 3-/5  SENSATION: grossly intact to light touch all extremities  CHEST/LUNG: Clear to auscultation bilaterally  HEART: +S1/+S2; Regular rate and rhythm  ABDOMEN: Soft, nontender, nondistended  SKIN: Warm, dry; no rashes or lesions    LABS:                        11.3   7.1   )-----------( 260      ( 26 Nov 2017 04:30 )             33.1     11-26    140  |  102  |  13.0  ----------------------------<  179<H>  3.7   |  24.0  |  0.48<L>    Ca    8.8      26 Nov 2017 04:30  Phos  2.9     11-26  Mg     2.0     11-26    PT/INR - ( 25 Nov 2017 11:25 )   PT: 11.9 sec;   INR: 1.08 ratio      PTT - ( 25 Nov 2017 11:25 )  PTT:29.5 sec    11-25 @ 07:01  -  11-26 @ 07:00  --------------------------------------------------------  IN: 1580 mL / OUT: 0 mL / NET: 1580 mL    11-26 @ 07:01  -  11-26 @ 10:14  --------------------------------------------------------  IN: 95 mL / OUT: 0 mL / NET: 95 mL      RADIOLOGY & ADDITIONAL TESTS:  - from: CT Head No Cont (11.25.17 @ 22:01)  FINDINGS:  Brain: Again seen is left basal ganglia intraparenchymal hemorrhage with   associated intraventricular breakthrough.. No mass effect or edema. No   midline shift. Parenchyma and sulci are age-appropriate.  Ventricles: Stableventricular size, no evidence of hydrocephalus.    Bones and soft tissues: No acute calvarial fracture.  Sinuses and mastoids: Unremarkable.  IMPRESSION:  No adverse interval change. Stable left basal ganglia hemorrhage with   intraventricular extension.    - from: CT Head No Cont (11.25.17 @ 11:37)   FINDINGS:  There is a large a left basal ganglia hemorrhage/hematoma with   surrounding edema and a direct intraventricular extension of blood   filling the left lateral ventricle with blood products in the third   ventricle and right lateral ventricle. No shift midline structures.  Mild   cerebral volume loss is present with secondary proportional prominence of   the sulci and ventricles.  The posterior fossa structures and basal cisterns appear normal.  There is nomidline shift.  There is no sulcal effacement to suggest acute stroke.  There are scattered white matter hypodensities consistent with small   vessel disease.  The visualized portions of theoptic globes and paranasal sinuses are   normal.  There are no skull fractures.  IMPRESSION:  Acute left basal ganglia hemorrhage with intraventricular extension   without midline shift.

## 2017-11-26 NOTE — PHYSICAL THERAPY INITIAL EVALUATION ADULT - CRITERIA FOR SKILLED THERAPEUTIC INTERVENTIONS
rehab potential/functional limitations in following categories/risk reduction/prevention/anticipated discharge recommendation/therapy frequency/predicted duration of therapy intervention/impairments found/anticipated equipment needs at discharge

## 2017-11-26 NOTE — SWALLOW BEDSIDE ASSESSMENT ADULT - SWALLOW EVAL: RECOMMENDED FEEDING/EATING TECHNIQUES
check mouth frequently for oral residue/pocketing/crush medication (when feasible)/position upright (90 degrees)/small sips/bites/maintain upright posture during/after eating for 30 mins/oral hygiene

## 2017-11-26 NOTE — PROGRESS NOTE ADULT - SUBJECTIVE AND OBJECTIVE BOX
Patient is a 82y old  Female who presents with a chief complaint of     BRIEF HOSPITAL COURSE: ***    Events last 24 hours: ***    PAST MEDICAL & SURGICAL HISTORY:  GERD (gastroesophageal reflux disease)  Diabetes mellitus  High cholesterol  Hypertension      Review of Systems:  CONSTITUTIONAL: No fever, chills, or fatigue  EYES: No eye pain, visual disturbances, or discharge  ENMT:  No difficulty hearing, tinnitus, vertigo; No sinus or throat pain  NECK: No pain or stiffness  RESPIRATORY: No cough, wheezing, chills or hemoptysis; No shortness of breath  CARDIOVASCULAR: No chest pain, palpitations, dizziness, or leg swelling  GASTROINTESTINAL: No abdominal or epigastric pain. No nausea, vomiting, or hematemesis; No diarrhea or constipation. No melena or hematochezia.  GENITOURINARY: No dysuria, frequency, hematuria, or incontinence  NEUROLOGICAL: No headaches, memory loss, loss of strength, numbness, or tremors  SKIN: No itching, burning, rashes, or lesions   MUSCULOSKELETAL: No joint pain or swelling; No muscle, back, or extremity pain  PSYCHIATRIC: No depression, anxiety, mood swings, or difficulty sleeping      Medications:    niCARdipine Infusion 2.5 mG/Hr IV Continuous <Continuous>      acetaminophen  Suppository 650 milliGRAM(s) Rectal every 6 hours PRN        pantoprazole  Injectable 40 milliGRAM(s) IV Push daily      atorvastatin 80 milliGRAM(s) Oral at bedtime  insulin lispro (HumaLOG) corrective regimen sliding scale   SubCutaneous three times a day before meals    sodium chloride 0.9%. 1000 milliLiter(s) IV Continuous <Continuous>                ICU Vital Signs Last 24 Hrs  T(C): 36.7 (25 Nov 2017 16:32), Max: 37.4 (25 Nov 2017 13:45)  T(F): 98.1 (25 Nov 2017 16:32), Max: 99.3 (25 Nov 2017 13:45)  HR: 82 (26 Nov 2017 09:18) (58 - 91)  BP: 147/65 (26 Nov 2017 09:18) (129/60 - 220/106)  BP(mean): 93 (26 Nov 2017 09:18) (86 - 115)  ABP: --  ABP(mean): --  RR: 18 (26 Nov 2017 09:18) (13 - 36)  SpO2: 99% (26 Nov 2017 09:18) (97% - 100%)          I&O's Detail    25 Nov 2017 07:01  -  26 Nov 2017 07:00  --------------------------------------------------------  IN:    niCARdipine Infusion: 520 mL    sodium chloride 0.9%.: 1010 mL    Solution: 50 mL  Total IN: 1580 mL    OUT:  Total OUT: 0 mL    Total NET: 1580 mL      26 Nov 2017 07:01  -  26 Nov 2017 10:43  --------------------------------------------------------  IN:    niCARdipine Infusion: 20 mL    sodium chloride 0.9%.: 75 mL  Total IN: 95 mL    OUT:  Total OUT: 0 mL    Total NET: 95 mL            LABS:                        11.3   7.1   )-----------( 260      ( 26 Nov 2017 04:30 )             33.1     11-26    140  |  102  |  13.0  ----------------------------<  179<H>  3.7   |  24.0  |  0.48<L>    Ca    8.8      26 Nov 2017 04:30  Phos  2.9     11-26  Mg     2.0     11-26        CARDIAC MARKERS ( 25 Nov 2017 11:25 )  x     / <0.01 ng/mL / 136 U/L / x     / x          CAPILLARY BLOOD GLUCOSE      POCT Blood Glucose.: 168 mg/dL (26 Nov 2017 05:45)    PT/INR - ( 25 Nov 2017 11:25 )   PT: 11.9 sec;   INR: 1.08 ratio         PTT - ( 25 Nov 2017 11:25 )  PTT:29.5 sec    CULTURES:      Physical Examination:    General: No acute distress.      HEENT: Pupils equal, reactive to light.  Symmetric.    PULM: Clear to auscultation bilaterally, no significant sputum production    CVS: Regular rate and rhythm, no murmurs, rubs, or gallops    ABD: Soft, nondistended, nontender, normoactive bowel sounds, no masses    EXT: No edema, nontender    SKIN: Warm and well perfused, no rashes noted.    NEURO: Alert, oriented, interactive, nonfocal    RADIOLOGY: ***    CRITICAL CARE TIME SPENT: *** Patient is a 82y old  Female who presents with a chief complaint of     BRIEF HOSPITAL COURSE: 82 year old female found with AMS by son on eval in ER CT shows L BG ICH, pt admitted to MICU for monitoring and care    Events last 24 hours: off cardene, passed dysphagia eval     PAST MEDICAL & SURGICAL HISTORY:  GERD (gastroesophageal reflux disease)  Diabetes mellitus  High cholesterol  Hypertension      Review of Systems: pt is alert and denies complaints in Cymro - reports being thirsty, stated she is forgetful - is not oriented to time and place cannot complete Full ROS  CONSTITUTIONAL: No fever, chills, or fatigue  EYES: No eye pain, visual disturbances, or discharge  ENMT:  No difficulty hearing, tinnitus, vertigo; No sinus or throat pain  NECK: No pain or stiffness  RESPIRATORY: No cough, wheezing, chills or hemoptysis; No shortness of breath  CARDIOVASCULAR: No chest pain, palpitations, dizziness, or leg swelling  GASTROINTESTINAL: No abdominal or epigastric pain. No nausea, vomiting, or hematemesis; No diarrhea or constipation. No melena or hematochezia.  GENITOURINARY: No dysuria, frequency, hematuria, or incontinence  NEUROLOGICAL: No headaches, memory loss, loss of strength, numbness, or tremors  SKIN: No itching, burning, rashes, or lesions   MUSCULOSKELETAL: No joint pain or swelling; No muscle, back, or extremity pain  PSYCHIATRIC: No depression, anxiety, mood swings, or difficulty sleeping      Medications:    niCARdipine Infusion 2.5 mG/Hr IV Continuous <Continuous>      acetaminophen  Suppository 650 milliGRAM(s) Rectal every 6 hours PRN        pantoprazole  Injectable 40 milliGRAM(s) IV Push daily      atorvastatin 80 milliGRAM(s) Oral at bedtime  insulin lispro (HumaLOG) corrective regimen sliding scale   SubCutaneous three times a day before meals    sodium chloride 0.9%. 1000 milliLiter(s) IV Continuous <Continuous>                ICU Vital Signs Last 24 Hrs  T(C): 36.7 (25 Nov 2017 16:32), Max: 37.4 (25 Nov 2017 13:45)  T(F): 98.1 (25 Nov 2017 16:32), Max: 99.3 (25 Nov 2017 13:45)  HR: 82 (26 Nov 2017 09:18) (58 - 91)  BP: 147/65 (26 Nov 2017 09:18) (129/60 - 220/106)  BP(mean): 93 (26 Nov 2017 09:18) (86 - 115)  ABP: --  ABP(mean): --  RR: 18 (26 Nov 2017 09:18) (13 - 36)  SpO2: 99% (26 Nov 2017 09:18) (97% - 100%)          I&O's Detail    25 Nov 2017 07:01  -  26 Nov 2017 07:00  --------------------------------------------------------  IN:    niCARdipine Infusion: 520 mL    sodium chloride 0.9%.: 1010 mL    Solution: 50 mL  Total IN: 1580 mL    OUT:  Total OUT: 0 mL    Total NET: 1580 mL      26 Nov 2017 07:01  -  26 Nov 2017 10:43  --------------------------------------------------------  IN:    niCARdipine Infusion: 20 mL    sodium chloride 0.9%.: 75 mL  Total IN: 95 mL    OUT:  Total OUT: 0 mL    Total NET: 95 mL            LABS:                        11.3   7.1   )-----------( 260      ( 26 Nov 2017 04:30 )             33.1     11-26    140  |  102  |  13.0  ----------------------------<  179<H>  3.7   |  24.0  |  0.48<L>    Ca    8.8      26 Nov 2017 04:30  Phos  2.9     11-26  Mg     2.0     11-26        CARDIAC MARKERS ( 25 Nov 2017 11:25 )  x     / <0.01 ng/mL / 136 U/L / x     / x          CAPILLARY BLOOD GLUCOSE      POCT Blood Glucose.: 168 mg/dL (26 Nov 2017 05:45)    PT/INR - ( 25 Nov 2017 11:25 )   PT: 11.9 sec;   INR: 1.08 ratio         PTT - ( 25 Nov 2017 11:25 )  PTT:29.5 sec    CULTURES:      Physical Examination:    General: No acute distress.      HEENT: Pupils equal, reactive to light.  Symmetric.    PULM: Clear to auscultation bilaterally, no significant sputum production    CVS: Regular rate and rhythm, + Systolic murmur    ABD: Soft, nondistended, nontender, normoactive bowel sounds, no masses    EXT: No edema, nontender    SKIN: Warm and well perfused, no rashes noted.    NEURO: Alert, oriented to self only, interactive, 3/5 RUE, 4/5 all other extremities, follows commands in [place]ym[place], R pronator drift     RADIOLOGY:   < from: CT Head No Cont (11.25.17 @ 22:01) >  TECHNIQUE: Noncontrast routine head CT.    COMPARISON: Prior exam from 11:30 AM    FINDINGS:    Brain: Again seen is left basal ganglia intraparenchymal hemorrhage with   associated intraventricular breakthrough.. No mass effect or edema. No   midline shift. Parenchyma and sulci are age-appropriate.  Ventricles: Stableventricular size, no evidence of hydrocephalus.    Bones and soft tissues: No acute calvarial fracture.  Sinuses and mastoids: Unremarkable.    IMPRESSION:    No adverse interval change. Stable left basal ganglia hemorrhage with   intraventricular extension.      < end of copied text >    CRITICAL CARE TIME SPENT: 60

## 2017-11-26 NOTE — SWALLOW BEDSIDE ASSESSMENT ADULT - ASR SWALLOW ASPIRATION MONITOR
gurgly voice/pneumonia/upper respiratory infection/change of breathing pattern/position upright (90Y)/fever/throat clearing/cough/oral hygiene

## 2017-11-26 NOTE — PHYSICAL THERAPY INITIAL EVALUATION ADULT - ADDITIONAL COMMENTS
Pt. unable to answer questions due to cognition, however, son at bedside able to report pt. social and functional hx. Pt. lives alone in an apartment with no stairs and was independent without a device prior to admission.

## 2017-11-26 NOTE — PHYSICAL THERAPY INITIAL EVALUATION ADULT - ACTIVE RANGE OF MOTION EXAMINATION, REHAB EVAL
right side limited by weakness/LLE Active ROM was WNL (within normal limits)/Left UE Active ROM was WNL (within normal limits)

## 2017-11-26 NOTE — PHYSICAL THERAPY INITIAL EVALUATION ADULT - MANUAL MUSCLE TESTING RESULTS, REHAB EVAL
right UE and LE 4/5, right UE grossly 2+/5, right LE difficult to formally assess due to right sided neglect: at least 2/5 throughout with exception to ankle no movement noted spontaneously

## 2017-11-26 NOTE — PROGRESS NOTE ADULT - SUBJECTIVE AND OBJECTIVE BOX
Patient is a 82y old  Female who presents with a chief complaint of     BRIEF HOSPITAL COURSE: 83 yo female, PMHx GERD, DM, HTN, HLD, found unresponsive by son. Per son, patient stated she fell earlier in the day. Last seen normal approx 10 PM the night prior. Found to have left basal ganglia hemorrhage with intraventricular extension.  At baseline patient lives alone and is independent with her ADLs. On ASA/Plavix. ROS unable to obtain as patient aphasic.    Events last 24 hours: Patient remains on Cardene gtt for BP control    PAST MEDICAL & SURGICAL HISTORY:  GERD (gastroesophageal reflux disease)  Diabetes mellitus  High cholesterol  Hypertension      Review of Systems:  CONSTITUTIONAL: No fever, chills, or fatigue  EYES: No eye pain, visual disturbances, or discharge  ENMT:  No difficulty hearing, tinnitus, vertigo; No sinus or throat pain  NECK: No pain or stiffness  RESPIRATORY: No cough, wheezing, chills or hemoptysis; No shortness of breath  CARDIOVASCULAR: No chest pain, palpitations, dizziness, or leg swelling  GASTROINTESTINAL: No abdominal or epigastric pain. No nausea, vomiting, or hematemesis; No diarrhea or constipation. No melena or hematochezia.  GENITOURINARY: No dysuria, frequency, hematuria, or incontinence  NEUROLOGICAL: No headaches, memory loss, loss of strength, numbness, or tremors  SKIN: No itching, burning, rashes, or lesions   MUSCULOSKELETAL: No joint pain or swelling; No muscle, back, or extremity pain  PSYCHIATRIC: No depression, anxiety, mood swings, or difficulty sleeping      Medications:    niCARdipine Infusion 2.5 mG/Hr IV Continuous <Continuous>      acetaminophen  Suppository 650 milliGRAM(s) Rectal every 6 hours PRN        pantoprazole  Injectable 40 milliGRAM(s) IV Push daily      atorvastatin 80 milliGRAM(s) Oral at bedtime  dextrose 50% Injectable 12.5 Gram(s) IV Push once  dextrose Gel 1 Dose(s) Oral once PRN  glucagon  Injectable 1 milliGRAM(s) IntraMuscular once PRN  insulin lispro (HumaLOG) corrective regimen sliding scale   SubCutaneous every 6 hours    dextrose 5%. 1000 milliLiter(s) IV Continuous <Continuous>  sodium chloride 0.9%. 1000 milliLiter(s) IV Continuous <Continuous>                ICU Vital Signs Last 24 Hrs  T(C): 36.7 (25 Nov 2017 16:32), Max: 37.4 (25 Nov 2017 13:45)  T(F): 98.1 (25 Nov 2017 16:32), Max: 99.3 (25 Nov 2017 13:45)  HR: 74 (26 Nov 2017 04:00) (58 - 91)  BP: 152/66 (26 Nov 2017 04:00) (129/60 - 220/106)  BP(mean): 95 (26 Nov 2017 04:00) (86 - 115)  ABP: --  ABP(mean): --  RR: 16 (26 Nov 2017 04:00) (13 - 36)  SpO2: 97% (26 Nov 2017 04:00) (97% - 100%)          I&O's Detail    25 Nov 2017 07:01  -  26 Nov 2017 04:18  --------------------------------------------------------  IN:    niCARdipine Infusion: 460 mL    sodium chloride 0.9%.: 785 mL    Solution: 50 mL  Total IN: 1295 mL    OUT:  Total OUT: 0 mL    Total NET: 1295 mL            LABS:                        12.6   6.5   )-----------( 260      ( 25 Nov 2017 11:26 )             36.2     11-25    136  |  96<L>  |  20.0  ----------------------------<  266<H>  4.4   |  26.0  |  0.52    Ca    9.5      25 Nov 2017 11:25  Phos  2.7     11-25  Mg     1.5     11-25        CARDIAC MARKERS ( 25 Nov 2017 11:25 )  x     / <0.01 ng/mL / 136 U/L / x     / x          CAPILLARY BLOOD GLUCOSE      POCT Blood Glucose.: 146 mg/dL (25 Nov 2017 23:34)    PT/INR - ( 25 Nov 2017 11:25 )   PT: 11.9 sec;   INR: 1.08 ratio         PTT - ( 25 Nov 2017 11:25 )  PTT:29.5 sec    CULTURES:      Physical Examination:    General: No acute distress.      HEENT: Pupils equal, reactive to light.  Symmetric.    PULM: Clear to auscultation bilaterally, no significant sputum production    CVS: Regular rate and rhythm, no murmurs, rubs, or gallops    ABD: Soft, nondistended, nontender, normoactive bowel sounds, no masses    EXT: No edema, nontender    SKIN: Warm and well perfused, no rashes noted.    NEURO: Alert, oriented, interactive, nonfocal    RADIOLOGY: ***    CRITICAL CARE TIME SPENT: I have spent *** minutes of critical care time evaluating and treating this patient, including reviewing charts, labs, imagining studies, and collaborating with interdisciplinary team. Patient is a 82y old  Female who presents with a chief complaint of     BRIEF HOSPITAL COURSE: 83 yo female, PMHx GERD, DM, HTN, HLD, found unresponsive by son. Per son, patient stated she fell earlier in the day. Last seen normal approx 10 PM the night prior. Found to have left basal ganglia hemorrhage with intraventricular extension.  At baseline patient lives alone and is independent with her ADLs. On ASA/Plavix. ROS unable to obtain as patient aphasic.    Events last 24 hours: Patient remains on Cardene gtt for BP control. Repeat CT head obtained, bleed stable     PAST MEDICAL & SURGICAL HISTORY:  GERD (gastroesophageal reflux disease)  Diabetes mellitus  High cholesterol  Hypertension      Review of Systems: unable to obtain secondary to dysphasia       Medications:  niCARdipine Infusion 2.5 mG/Hr IV Continuous <Continuous>  acetaminophen  Suppository 650 milliGRAM(s) Rectal every 6 hours PRN  pantoprazole  Injectable 40 milliGRAM(s) IV Push daily  atorvastatin 80 milliGRAM(s) Oral at bedtime  dextrose 50% Injectable 12.5 Gram(s) IV Push once  dextrose Gel 1 Dose(s) Oral once PRN  glucagon  Injectable 1 milliGRAM(s) IntraMuscular once PRN  insulin lispro (HumaLOG) corrective regimen sliding scale   SubCutaneous every 6 hours  dextrose 5%. 1000 milliLiter(s) IV Continuous <Continuous>  sodium chloride 0.9%. 1000 milliLiter(s) IV Continuous <Continuous>      ICU Vital Signs Last 24 Hrs  T(C): 36.7 (25 Nov 2017 16:32), Max: 37.4 (25 Nov 2017 13:45)  T(F): 98.1 (25 Nov 2017 16:32), Max: 99.3 (25 Nov 2017 13:45)  HR: 74 (26 Nov 2017 04:00) (58 - 91)  BP: 152/66 (26 Nov 2017 04:00) (129/60 - 220/106)  BP(mean): 95 (26 Nov 2017 04:00) (86 - 115)  ABP: --  ABP(mean): --  RR: 16 (26 Nov 2017 04:00) (13 - 36)  SpO2: 97% (26 Nov 2017 04:00) (97% - 100%)        I&O's Detail    25 Nov 2017 07:01  -  26 Nov 2017 04:18  --------------------------------------------------------  IN:    niCARdipine Infusion: 460 mL    sodium chloride 0.9%.: 785 mL    Solution: 50 mL  Total IN: 1295 mL    OUT:  Total OUT: 0 mL    Total NET: 1295 mL        LABS:                        12.6   6.5   )-----------( 260      ( 25 Nov 2017 11:26 )             36.2     11-25    136  |  96<L>  |  20.0  ----------------------------<  266<H>  4.4   |  26.0  |  0.52    Ca    9.5      25 Nov 2017 11:25  Phos  2.7     11-25  Mg     1.5     11-25        CARDIAC MARKERS ( 25 Nov 2017 11:25 )  x     / <0.01 ng/mL / 136 U/L / x     / x          CAPILLARY BLOOD GLUCOSE      POCT Blood Glucose.: 146 mg/dL (25 Nov 2017 23:34)    PT/INR - ( 25 Nov 2017 11:25 )   PT: 11.9 sec;   INR: 1.08 ratio         PTT - ( 25 Nov 2017 11:25 )  PTT:29.5 sec    CULTURES:      Physical Examination:    General: Well appearing female lying in bed in no acute distress.      HEENT: Pupils equal, reactive to light. Symmetric. EOMI    PULM: Clear to auscultation bilaterally, no significant sputum production    CVS: Regular rate and rhythm, no murmurs, rubs, or gallops    ABD: Soft, nondistended, nontender, normoactive bowel sounds, no masses    EXT: No edema, nontender    SKIN: Warm and well perfused, no rashes noted.    NEURO: Alert, oriented x 2, poor concentration, Strength RUE 3/5, LUE 4/5, LLE 2/5, RLE 1/5    RADIOLOGY: < from: CT Head No Cont (11.25.17 @ 22:01) >     EXAM:  CT BRAIN                          PROCEDURE DATE:  11/25/2017      INTERPRETATION:  CT HEAD WITHOUT CONTRAST    INDICATION: Follow-up left basal ganglia hemorrhage    TECHNIQUE: Noncontrast routine head CT.    COMPARISON: Prior exam from 11:30 AM    FINDINGS:    Brain: Again seen is left basal ganglia intraparenchymal hemorrhage with   associated intraventricular breakthrough.. No mass effect or edema. No   midline shift. Parenchyma and sulci are age-appropriate.  Ventricles: Stableventricular size, no evidence of hydrocephalus.    Bones and soft tissues: No acute calvarial fracture.  Sinuses and mastoids: Unremarkable.    IMPRESSION:    No adverse interval change. Stable left basal ganglia hemorrhage with   intraventricular extension.      GEORGE CAMACHO M.D., ATTENDING RADIOLOGIST  This document has been electronically signed. Nov 26 2017  1:09AM      CRITICAL CARE TIME SPENT: I have spent 34 minutes of critical care time evaluating and treating this patient, including reviewing charts, labs, imagining studies, and collaborating with interdisciplinary team.

## 2017-11-26 NOTE — SWALLOW BEDSIDE ASSESSMENT ADULT - ORAL PREPARATORY PHASE
Reduced oral grading Decreased mastication ability/Reduced oral grading Reduced oral grading/Anterior loss of bolus

## 2017-11-26 NOTE — PROGRESS NOTE ADULT - ASSESSMENT
A/P: 82y Female with left basal ganglia hemorrhage with intraventricular extension, likely hypertensive in nature. Repeat CT head stable. Patient exam improved today, less confused, speech better.  - Will d/w attending  - No acute neurosurgical intervention warranted at this time  - Continue HOB 30 degrees, Q1 neuro checks  - Hold anticoagulation/antiplatelet therapy  - Continue BP control, SBP goal < 150, has been mostly < 150 since admission to ICU  - Recommend neurology consult  - PT/OT/Speech/PM&R evaluations  - Supportive care per MICU

## 2017-11-26 NOTE — PROGRESS NOTE ADULT - ASSESSMENT
81 yo female, PMHx GERD, DM, HTN, HLD, found unresponsive admitted with hypertensive emergency and L basal ganglia hemorrhage secondary to hypertension.    PLAN:  - Will obtain STAT CT head should patient mental status change/clinically decompensate  - Hold anticoagulation/antiplatelet therapy  - Continue Cardene gtt titrate to SBP goal < 150  - HOB >30 degrees  - Q1H neuro checks  - PT/OT/Speech/PM&R  - Statin therapy   - NPO for now pending formal swallow eval, monitor FS q6h and cover with ISS  - PPI daily for GERD

## 2017-11-26 NOTE — PROGRESS NOTE ADULT - PROBLEM SELECTOR PLAN 1
repeat CT stable  off cardene gtts  neurology consult called - recommended MRA/I of brain  neurosurgery team following  echo, PT eval pending

## 2017-11-26 NOTE — CONSULT NOTE ADULT - SUBJECTIVE AND OBJECTIVE BOX
HPI: 81yo RH Slovenian speaking female who is poor historian, therefore, obtained from Dr Underwood and medical records.  Pt apparently found down earlier yesterday by son found and brought to the hospital for evaluation.  Pt found to have left basal ganglia hemorrhage with intraventricular extension. At baseline patient lives alone and is independent with her activities of daily living. Last seen baseline ~ 10 PM night prior to admission. Apparently pt uses ASA/Plavix at home. No apparently similar in past.  Also knowh of having baseline dementia.  Now seen in CICU today.  No new changes overnight.      PAST MEDICAL & SURGICAL HISTORY:  GERD (gastroesophageal reflux disease)  Diabetes mellitus  High cholesterol  Hypertension    MEDICATIONS  (STANDING):  atorvastatin 80 milliGRAM(s) Oral at bedtime  cloNIDine 0.2 milliGRAM(s) Oral daily  clopidogrel Tablet 75 milliGRAM(s) Oral daily  insulin lispro (HumaLOG) corrective regimen sliding scale   SubCutaneous three times a day before meals  losartan 25 milliGRAM(s) Oral daily  metoprolol     tartrate 25 milliGRAM(s) Oral two times a day  pantoprazole  Injectable 40 milliGRAM(s) IV Push daily    MEDICATIONS  (PRN):  acetaminophen  Suppository 650 milliGRAM(s) Rectal every 6 hours PRN For Temp greater than 38 C (100.4 F)  hydrALAZINE Injectable 10 milliGRAM(s) IV Push every 4 hours PRN SBP > 160    Allergies    No Known Allergies    Intolerances        FAMILY HISTORY:          SOCIAL HISTORY:  Denies toxic habits;     REVIEW OF SYSTEMS:    As noted in the HPI.    VITAL SIGNS:  Vital Signs Last 24 Hrs  T(C): 37.1 (26 Nov 2017 14:24), Max: 37.1 (26 Nov 2017 14:24)  T(F): 98.8 (26 Nov 2017 14:24), Max: 98.8 (26 Nov 2017 14:24)  HR: 62 (26 Nov 2017 13:00) (62 - 90)  BP: 123/58 (26 Nov 2017 13:00) (123/58 - 157/69)  BP(mean): 84 (26 Nov 2017 13:00) (84 - 115)  RR: 18 (26 Nov 2017 13:00) (13 - 36)  SpO2: 100% (26 Nov 2017 13:00) (97% - 100%)    PHYSICAL EXAMINATION:  General: Well-developed, well nourished, in no acute distress.  Eyes: Conjunctiva and sclera clear. Fundoscopic examination was deferred.  Neck: Supple.  Cardiac: +S1 & S2; Regular.  Chest: CTA b/l.    Musculoskeletal: No tenderness on palpation of spine.  No Brudzinski/Kernig's sign.    Neurologic:  - Mental Status:  Alert, awake, oriented to person, place. Speech is fair in [place]ian.  Follows with L side.  Noted to have some R sided neglect.  Cranial Nerves II-XII:    II:  Visual acuity is normal for age ; Visual fields are full to confrontation; Pupils are equal, round, and reactive to light.  III, IV, VI:  Extraocular movements are intact without nystagmus.  V:  Facial sensation is intact in the V1-V3 distribution bilaterally.  VII:  R facial asymmetry  VIII:  Hearing is intact and symmetric for age  IX, X:  Uvula is midline and soft palate rises symmetrically  XI:  Head turning and shoulder shrug are intact.  XII:  Tongue protrudes in the midline.  - Motor:  Strength is full L side.  R side hemiplegia.  There is no pronator drift on L side.  Normal muscle bulk and tone throughout.  - Reflexes:  2+ and symmetric throughout.  Plantars L downwards and R side babinski present.  - Sensory:  Intact and symmetric to light touch, and joint-position sense.  - Coordination:  Pt unable.   - Gait: Pt unable.      LABS:                          11.3   7.1   )-----------( 260      ( 26 Nov 2017 04:30 )             33.1     26 Nov 2017 04:30    140    |  102    |  13.0   ----------------------------<  179    3.7     |  24.0   |  0.48     Ca    8.8        26 Nov 2017 04:30  Phos  2.9       26 Nov 2017 04:30  Mg     2.0       26 Nov 2017 04:30        PT/INR - ( 25 Nov 2017 11:25 )   PT: 11.9 sec;   INR: 1.08 ratio         PTT - ( 25 Nov 2017 11:25 )  PTT:29.5 sec      RADIOLOGY & ADDITIONAL STUDIES:      CT Head No Cont (11.25.17 @ 11:37) >  Acute left basal ganglia hemorrhage with intraventricular extension   without midline shift.    CT Head No Cont (11.25.17 @ 22:01) >  No adverse interval change. Stable left basal ganglia hemorrhage with   intraventricular extension.    CT Cervical Spine No Cont (11.25.17 @ 11:48) >  Somewhat motion limited exam without obvious acute findings.          IMPRESSION:  Acute L BG bleed likely related to HTN.

## 2017-11-27 DIAGNOSIS — G81.90 HEMIPLEGIA, UNSPECIFIED AFFECTING UNSPECIFIED SIDE: ICD-10-CM

## 2017-11-27 DIAGNOSIS — I61.9 NONTRAUMATIC INTRACEREBRAL HEMORRHAGE, UNSPECIFIED: ICD-10-CM

## 2017-11-27 DIAGNOSIS — I16.1 HYPERTENSIVE EMERGENCY: ICD-10-CM

## 2017-11-27 DIAGNOSIS — I10 ESSENTIAL (PRIMARY) HYPERTENSION: ICD-10-CM

## 2017-11-27 LAB
ANION GAP SERPL CALC-SCNC: 13 MMOL/L — SIGNIFICANT CHANGE UP (ref 5–17)
BUN SERPL-MCNC: 14 MG/DL — SIGNIFICANT CHANGE UP (ref 8–20)
CALCIUM SERPL-MCNC: 8.7 MG/DL — SIGNIFICANT CHANGE UP (ref 8.6–10.2)
CHLORIDE SERPL-SCNC: 104 MMOL/L — SIGNIFICANT CHANGE UP (ref 98–107)
CO2 SERPL-SCNC: 23 MMOL/L — SIGNIFICANT CHANGE UP (ref 22–29)
CREAT SERPL-MCNC: 0.55 MG/DL — SIGNIFICANT CHANGE UP (ref 0.5–1.3)
GLUCOSE BLDC GLUCOMTR-MCNC: 169 MG/DL — HIGH (ref 70–99)
GLUCOSE BLDC GLUCOMTR-MCNC: 201 MG/DL — HIGH (ref 70–99)
GLUCOSE BLDC GLUCOMTR-MCNC: 247 MG/DL — HIGH (ref 70–99)
GLUCOSE BLDC GLUCOMTR-MCNC: 328 MG/DL — HIGH (ref 70–99)
GLUCOSE SERPL-MCNC: 116 MG/DL — HIGH (ref 70–115)
HCT VFR BLD CALC: 32.3 % — LOW (ref 37–47)
HGB BLD-MCNC: 10.9 G/DL — LOW (ref 12–16)
MAGNESIUM SERPL-MCNC: 1.7 MG/DL — SIGNIFICANT CHANGE UP (ref 1.6–2.6)
MCHC RBC-ENTMCNC: 25.2 PG — LOW (ref 27–31)
MCHC RBC-ENTMCNC: 33.7 G/DL — SIGNIFICANT CHANGE UP (ref 32–36)
MCV RBC AUTO: 74.6 FL — LOW (ref 81–99)
PHOSPHATE SERPL-MCNC: 2.4 MG/DL — SIGNIFICANT CHANGE UP (ref 2.4–4.7)
PLATELET # BLD AUTO: 278 K/UL — SIGNIFICANT CHANGE UP (ref 150–400)
POTASSIUM SERPL-MCNC: 3.4 MMOL/L — LOW (ref 3.5–5.3)
POTASSIUM SERPL-SCNC: 3.4 MMOL/L — LOW (ref 3.5–5.3)
RBC # BLD: 4.33 M/UL — LOW (ref 4.4–5.2)
RBC # FLD: 20.2 % — HIGH (ref 11–15.6)
SODIUM SERPL-SCNC: 140 MMOL/L — SIGNIFICANT CHANGE UP (ref 135–145)
WBC # BLD: 8.2 K/UL — SIGNIFICANT CHANGE UP (ref 4.8–10.8)
WBC # FLD AUTO: 8.2 K/UL — SIGNIFICANT CHANGE UP (ref 4.8–10.8)

## 2017-11-27 PROCEDURE — 70544 MR ANGIOGRAPHY HEAD W/O DYE: CPT | Mod: 26

## 2017-11-27 PROCEDURE — 99233 SBSQ HOSP IP/OBS HIGH 50: CPT

## 2017-11-27 RX ORDER — INSULIN GLARGINE 100 [IU]/ML
10 INJECTION, SOLUTION SUBCUTANEOUS AT BEDTIME
Qty: 0 | Refills: 0 | Status: DISCONTINUED | OUTPATIENT
Start: 2017-11-27 | End: 2017-11-27

## 2017-11-27 RX ORDER — POTASSIUM CHLORIDE 20 MEQ
40 PACKET (EA) ORAL ONCE
Qty: 0 | Refills: 0 | Status: DISCONTINUED | OUTPATIENT
Start: 2017-11-27 | End: 2017-11-27

## 2017-11-27 RX ORDER — LOSARTAN POTASSIUM 100 MG/1
50 TABLET, FILM COATED ORAL DAILY
Qty: 0 | Refills: 0 | Status: DISCONTINUED | OUTPATIENT
Start: 2017-11-27 | End: 2017-11-30

## 2017-11-27 RX ORDER — HYDRALAZINE HCL 50 MG
10 TABLET ORAL ONCE
Qty: 0 | Refills: 0 | Status: COMPLETED | OUTPATIENT
Start: 2017-11-27 | End: 2017-11-27

## 2017-11-27 RX ORDER — AMLODIPINE BESYLATE 2.5 MG/1
5 TABLET ORAL DAILY
Qty: 0 | Refills: 0 | Status: DISCONTINUED | OUTPATIENT
Start: 2017-11-27 | End: 2017-11-28

## 2017-11-27 RX ORDER — INSULIN LISPRO 100/ML
4 VIAL (ML) SUBCUTANEOUS
Qty: 0 | Refills: 0 | Status: DISCONTINUED | OUTPATIENT
Start: 2017-11-27 | End: 2017-11-30

## 2017-11-27 RX ORDER — METOPROLOL TARTRATE 50 MG
25 TABLET ORAL
Qty: 0 | Refills: 0 | Status: DISCONTINUED | OUTPATIENT
Start: 2017-11-27 | End: 2017-11-27

## 2017-11-27 RX ORDER — POTASSIUM CHLORIDE 20 MEQ
40 PACKET (EA) ORAL ONCE
Qty: 0 | Refills: 0 | Status: COMPLETED | OUTPATIENT
Start: 2017-11-27 | End: 2017-11-27

## 2017-11-27 RX ORDER — MAGNESIUM SULFATE 500 MG/ML
2 VIAL (ML) INJECTION ONCE
Qty: 0 | Refills: 0 | Status: COMPLETED | OUTPATIENT
Start: 2017-11-27 | End: 2017-11-27

## 2017-11-27 RX ADMIN — Medication 25 MILLIGRAM(S): at 05:47

## 2017-11-27 RX ADMIN — PANTOPRAZOLE SODIUM 40 MILLIGRAM(S): 20 TABLET, DELAYED RELEASE ORAL at 11:46

## 2017-11-27 RX ADMIN — Medication 1 PATCH: at 11:47

## 2017-11-27 RX ADMIN — Medication 2: at 08:13

## 2017-11-27 RX ADMIN — Medication 4: at 22:51

## 2017-11-27 RX ADMIN — Medication 8: at 11:46

## 2017-11-27 RX ADMIN — Medication 50 GRAM(S): at 08:08

## 2017-11-27 RX ADMIN — ATORVASTATIN CALCIUM 80 MILLIGRAM(S): 80 TABLET, FILM COATED ORAL at 22:50

## 2017-11-27 RX ADMIN — LOSARTAN POTASSIUM 25 MILLIGRAM(S): 100 TABLET, FILM COATED ORAL at 05:48

## 2017-11-27 RX ADMIN — Medication: at 17:42

## 2017-11-27 RX ADMIN — Medication 0.2 MILLIGRAM(S): at 05:48

## 2017-11-27 RX ADMIN — Medication 40 MILLIEQUIVALENT(S): at 08:07

## 2017-11-27 RX ADMIN — Medication 4 UNIT(S): at 17:42

## 2017-11-27 RX ADMIN — LOSARTAN POTASSIUM 50 MILLIGRAM(S): 100 TABLET, FILM COATED ORAL at 08:29

## 2017-11-27 RX ADMIN — Medication 10 MILLIGRAM(S): at 05:06

## 2017-11-27 RX ADMIN — INSULIN GLARGINE 10 UNIT(S): 100 INJECTION, SOLUTION SUBCUTANEOUS at 22:50

## 2017-11-27 RX ADMIN — Medication 10 MILLIGRAM(S): at 00:47

## 2017-11-27 NOTE — PROGRESS NOTE ADULT - SUBJECTIVE AND OBJECTIVE BOX
INTERVAL HISTORY:  stable, awake and cooperative      VITAL SIGNS:  Vital Signs Last 24 Hrs  T(C): 37.1 (27 Nov 2017 07:00), Max: 37.3 (27 Nov 2017 05:00)  T(F): 98.7 (27 Nov 2017 07:00), Max: 99.2 (27 Nov 2017 05:00)  HR: 60 (27 Nov 2017 10:00) (51 - 81)  BP: 141/65 (27 Nov 2017 10:00) (123/58 - 181/77)  BP(mean): 94 (27 Nov 2017 10:00) (83 - 115)  RR: 22 (27 Nov 2017 10:00) (14 - 26)  SpO2: 100% (27 Nov 2017 10:00) (98% - 100%)    PHYSICAL EXAMINATION:    Mentation:  awake, follows commands- Syriac  Language/Speech: appears to be intact  CN: right facial droop. 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/  Visual Fields: full  Motor: moderately sever right hemiparesisi  Sensory:  DTR:  Babinski: nonreactive      MEDS:  MEDICATIONS  (STANDING):  atorvastatin 80 milliGRAM(s) Oral at bedtime  cloNIDine Patch 0.2 mG/24Hr(s) 1 patch Topical every 7 days  insulin glargine Injectable (LANTUS) 10 Unit(s) SubCutaneous at bedtime  insulin lispro (HumaLOG) corrective regimen sliding scale   SubCutaneous Before meals and at bedtime  losartan 50 milliGRAM(s) Oral daily  metoprolol     tartrate 25 milliGRAM(s) Oral two times a day  pantoprazole  Injectable 40 milliGRAM(s) IV Push daily    MEDICATIONS  (PRN):  acetaminophen  Suppository 650 milliGRAM(s) Rectal every 6 hours PRN For Temp greater than 38 C (100.4 F)  hydrALAZINE Injectable 10 milliGRAM(s) IV Push every 4 hours PRN SBP > 160      LABS:                          10.9   8.2   )-----------( 278      ( 27 Nov 2017 04:32 )             32.3     11-27    140  |  104  |  14.0  ----------------------------<  116<H>  3.4<L>   |  23.0  |  0.55    Ca    8.7      27 Nov 2017 04:32  Phos  2.4     11-27  Mg     1.7     11-27            RADIOLOGY & ADDITIONAL STUDIES:      IMPRESSION & PLAN:    Left basal ganglia bleed with IVH- she is neurologically stable with residual right hemiparesis  MRI brain with without lou ordered, also needs MRA brain  Continue critical care and neurosurgical management  Will not actively follow,   Please recontact as needed.

## 2017-11-27 NOTE — PROGRESS NOTE ADULT - SUBJECTIVE AND OBJECTIVE BOX
Patient: ZION LEACH [id number] 82y Female                 Internal Medicine Hospitalist Progress Note - Dr. Tereso Grady    Chief Complaint: Patient is a 82y old  Female who presents with a chief complaint of   HPI:  81 yo female, PMHx GERD, DM, HTN, HLD, found unresponsive by son. Per son, patient stated she fell earlier in the day. Last seen normal approx 10 PM the night prior. Found to have left basal ganglia hemorrhage with intraventricular extension.  At baseline patient lives alone and is independent with her ADLs. On ASA/Plavix. Seen by neurology and neurosurgery, admitted initially to MICU.  Speech and mental status seemed to improve.      Interim history: Pt seen with RN at bedside.  She was unable to tolerate MRI.  Denies specific complaints.  No pain.  No new weakness / numbness.  Per RN PVR was 500ml this afternoon.      ____________________PHYSICAL EXAM:  Vitals reviewed as indicated below  GENERAL:  NAD Alert and Oriented x 2 to person, place.   HEENT: NCAT  CARDIOVASCULAR:  S1, S2  LUNGS: CTAB  ABDOMEN:  soft, (-) tenderness, (-) distension, (+) bowel sounds, (-) guarding, (-) rebound (-) rigidity  EXTREMITIES:  no cyanosis / clubbing / edema.   NEURO: R HP. L facial droop.  Slurred speech  ____________________    BACKGROUND:  HEALTH ISSUES - PROBLEM Dx:  Hypertension: Hypertension  Hemiparesis: Hemiparesis  Hypertensive emergency: Hypertensive emergency  Hemorrhagic stroke: Hemorrhagic stroke  Hypertension, unspecified type: Hypertension, unspecified type  Diabetes mellitus: Diabetes mellitus  Essential hypertension: Essential hypertension  Intracranial hemorrhage: Intracranial hemorrhage        Allergies    No Known Allergies    Intolerances      PAST MEDICAL & SURGICAL HISTORY:  GERD (gastroesophageal reflux disease)  Diabetes mellitus  High cholesterol  Hypertension      VITALS:  Vital Signs Last 24 Hrs  T(C): 37.2 (2017 12:30), Max: 37.3 (2017 05:00)  T(F): 99 (2017 12:30), Max: 99.2 (2017 05:00)  HR: 65 (2017 12:00) (51 - 74)  BP: 158/70 (2017 12:00) (127/58 - 181/77)  BP(mean): 101 (2017 12:00) (83 - 115)  RR: 20 (2017 12:00) (14 - 26)  SpO2: 100% (2017 12:00) (99% - 100%) Daily     Daily Weight in k.3 (2017 08:19)  CAPILLARY BLOOD GLUCOSE      POCT Blood Glucose.: 328 mg/dL (2017 11:30)  POCT Blood Glucose.: 169 mg/dL (2017 08:12)  POCT Blood Glucose.: 193 mg/dL (2017 21:51)  POCT Blood Glucose.: 302 mg/dL (2017 17:11)    I&O's Summary    2017 07:01  -  2017 07:00  --------------------------------------------------------  IN: 717.5 mL / OUT: 0 mL / NET: 717.5 mL    2017 07:01  -  2017 15:19  --------------------------------------------------------  IN: 890 mL / OUT: 0 mL / NET: 890 mL        LABS:                        10.9   8.2   )-----------( 278      ( 2017 04:32 )             32.3         140  |  104  |  14.0  ----------------------------<  116<H>  3.4<L>   |  23.0  |  0.55    Ca    8.7      2017 04:32  Phos  2.4       Mg     1.7           MEDICATIONS:  MEDICATIONS  (STANDING):  atorvastatin 80 milliGRAM(s) Oral at bedtime  cloNIDine Patch 0.2 mG/24Hr(s) 1 patch Topical every 7 days  insulin glargine Injectable (LANTUS) 10 Unit(s) SubCutaneous at bedtime  insulin lispro (HumaLOG) corrective regimen sliding scale   SubCutaneous Before meals and at bedtime  insulin lispro Injectable (HumaLOG) 4 Unit(s) SubCutaneous three times a day before meals  losartan 50 milliGRAM(s) Oral daily  metoprolol     tartrate 25 milliGRAM(s) Oral two times a day  pantoprazole  Injectable 40 milliGRAM(s) IV Push daily    MEDICATIONS  (PRN):  acetaminophen  Suppository 650 milliGRAM(s) Rectal every 6 hours PRN For Temp greater than 38 C (100.4 F)  hydrALAZINE Injectable 10 milliGRAM(s) IV Push every 4 hours PRN SBP > 160

## 2017-11-27 NOTE — PROGRESS NOTE ADULT - ATTENDING COMMENTS
INES Attg:    Patient seen    A and O x 2  PERRL  EOMI  R facial   R neglect  BANERJEE to command  RHP    CT -- stable ICH/IVH    agree with plan as above
INES Attg:    agree with above  f/u as outpatient in 2 weeks
spoke to pt's son Chandra Membreno via phone as to plan of care.
I saw this patient independently and agree with the above.    Plan to MRI head; continue with blood pressure, blood glucose control.  Call placed to son, Chandra Membreno, to update on condition, and left message on machine.  OK for transfer out of ICU at this point for less frequent neurologic checks.  Discussed with Rebecca Garcia and [place].

## 2017-11-27 NOTE — DIETITIAN INITIAL EVALUATION ADULT. - OTHER INFO
Pt admitted with Lt basal ganglia hemmorrhage secondary to HTN. SLP following pt, diet consistency per recommendations. Noted Hgb A1C of 10.3. CNA reports pt eats well when total feeding assistance and encouragement provided.

## 2017-11-27 NOTE — PROGRESS NOTE ADULT - SUBJECTIVE AND OBJECTIVE BOX
INTERVAL HPI/OVERNIGHT EVENTS:  s/p left basal ganglia stroke  82y Female with left basal ganglia hemorrhage with intraventricular extension, likely hypertensive in nature. Patient seen earlier this am approx at 8am Pt awake, alert, resp, oriented self, place unknown,     MEDICATIONS  (STANDING):  amLODIPine   Tablet 5 milliGRAM(s) Oral daily  atorvastatin 80 milliGRAM(s) Oral at bedtime  cloNIDine Patch 0.2 mG/24Hr(s) 1 patch Topical every 7 days  insulin glargine Injectable (LANTUS) 10 Unit(s) SubCutaneous at bedtime  insulin lispro (HumaLOG) corrective regimen sliding scale   SubCutaneous Before meals and at bedtime  insulin lispro Injectable (HumaLOG) 4 Unit(s) SubCutaneous three times a day before meals  losartan 50 milliGRAM(s) Oral daily  pantoprazole  Injectable 40 milliGRAM(s) IV Push daily    MEDICATIONS  (PRN):  acetaminophen  Suppository 650 milliGRAM(s) Rectal every 6 hours PRN For Temp greater than 38 C (100.4 F)  hydrALAZINE Injectable 10 milliGRAM(s) IV Push every 4 hours PRN SBP > 160      Allergies  No Known Allergies  Intolerances    Vital Signs Last 24 Hrs  T(C): 36.5 (27 Nov 2017 15:42), Max: 37.3 (27 Nov 2017 05:00)  T(F): 97.7 (27 Nov 2017 15:42), Max: 99.2 (27 Nov 2017 05:00)  HR: 73 (27 Nov 2017 15:42) (51 - 74)  BP: 196/80 (27 Nov 2017 15:42) (127/58 - 196/80)  BP(mean): 101 (27 Nov 2017 12:00) (83 - 115)  RR: 18 (27 Nov 2017 15:42) (14 - 26)  SpO2: 100% (27 Nov 2017 15:42) (99% - 100%)    PHYSICAL EXAM:    GENERAL: NAD, well-groomed, well-developed  HEAD:  Atraumatic, Normocephalic  EYES: EOMI, PERRLA, conjunctiva and sclera clear  ENMT: No tonsillar erythema, exudates, or enlargement; Moist mucous membranes, positive left sided facial  NECK: Supple,   NERVOUS SYSTEM:  Alert & Oriented X1, Good concentration; Motor Strength 5/5 B/L upper and lower extremities;   CHEST/LUNG: Clear BS bilaterally; No rales, rhonchi, wheezing, or rubs  HEART: Regular rate and rhythm; No murmurs, rubs, or gallops  ABDOMEN: Soft, Nontender, Nondistended; Bowel sounds present  EXTREMITIES:, No clubbing, cyanosis, or edema    LABS:                        10.9   8.2   )-----------( 278      ( 27 Nov 2017 04:32 )             32.3     11-27    140  |  104  |  14.0  ----------------------------<  116<H>  3.4<L>   |  23.0  |  0.55    Ca    8.7      27 Nov 2017 04:32  Phos  2.4     11-27  Mg     1.7     11-27        RADIOLOGY & ADDITIONAL TESTS:  < from: CT Head No Cont (11.25.17 @ 22:01) >  IMPRESSION:  No adverse interval change. Stable left basal ganglia hemorrhage with   intraventricular extension.  < end of copied text >    < from: CT Head No Cont (11.25.17 @ 11:37) >  IMPRESSION:  Acute left basal ganglia hemorrhage with intraventricular extension   without midline shift.    < end of copied text >

## 2017-11-27 NOTE — PROGRESS NOTE ADULT - ASSESSMENT
83 yo female, PMHx GERD, DM, HTN, HLD, found unresponsive admitted with hypertensive emergency and L basal ganglia hemorrhage secondary to hypertension, with uncontrolled hyperglycemia.    > L basal ganglia hemorrhage - Neurology, Neurosurgery input appreciated.  Continue to observe off antiplatelets.  Reattempt MRI brain perhaps tomorrow.  > HTN - Add Norvasc.  Will d/c BBLocker due to intermittent bradycardia.  > GERD - continue PPI.  > Diabetes - monitor fingersticks.  Insulin coverage for hyperglycemia.  Lantus  > HLD - continue Statin  > Urinary Retention - PVR was 500ml.  Straight cath and observe for retention.   > DVT Prophylaxis - Lower extremity intermittent compression devices.

## 2017-11-27 NOTE — PROGRESS NOTE ADULT - ASSESSMENT
82yf pt awake, alert, resp, pos diff with memory -unknown if this is baseline. Pt had a ct of the brain which demonstrates stable left basal ganglia hemorrhage with IVH.  Pt stable ct of the brain has remained stable no Neurosurgical intervention needed.  Pt will need to continue to follow with medicine and neurology.  Neurosurgery will follow if any surgerical intervention needed.

## 2017-11-27 NOTE — PROGRESS NOTE ADULT - SUBJECTIVE AND OBJECTIVE BOX
Patient is a 82y old  Female who presents with a chief complaint of     BRIEF HOSPITAL COURSE: 83 yo female, PMHx GERD, DM, HTN, HLD, found unresponsive by son. Per son, patient stated she fell earlier in the day. Last seen normal approx 10 PM the night prior. Found to have left basal ganglia hemorrhage with intraventricular extension.  At baseline patient lives alone and is independent with her ADLs. On ASA/Plavix. ROS unable to obtain as patient aphasic.     Events last 24 hours: Patient off Cardene overnight. Neuro exam stable. Started on Lantus for hyperglycemia on sliding scale, hgbA1C 10.3 on oral therapy as outpatient.      PAST MEDICAL & SURGICAL HISTORY:  GERD (gastroesophageal reflux disease)  Diabetes mellitus  High cholesterol  Hypertension      Review of Systems:  CONSTITUTIONAL: No fever, chills, or fatigue  EYES: No eye pain, visual disturbances, or discharge  ENMT:  No difficulty hearing, tinnitus, vertigo; No sinus or throat pain  NECK: No pain or stiffness  RESPIRATORY: No cough, wheezing, chills or hemoptysis; No shortness of breath  CARDIOVASCULAR: No chest pain, palpitations, dizziness, or leg swelling  GASTROINTESTINAL: No abdominal or epigastric pain. No nausea, vomiting, or hematemesis; No diarrhea or constipation. No melena or hematochezia.  GENITOURINARY: No dysuria, frequency, hematuria, or incontinence  NEUROLOGICAL: No headaches, memory loss, loss of strength, numbness, or tremors  SKIN: No itching, burning, rashes, or lesions   MUSCULOSKELETAL: No joint pain or swelling; No muscle, back, or extremity pain  PSYCHIATRIC: No depression, anxiety, mood swings, or difficulty sleeping      Medications:  cloNIDine 0.2 milliGRAM(s) Oral daily  hydrALAZINE Injectable 10 milliGRAM(s) IV Push every 4 hours PRN  losartan 25 milliGRAM(s) Oral daily  metoprolol     tartrate 25 milliGRAM(s) Oral two times a day  acetaminophen  Suppository 650 milliGRAM(s) Rectal every 6 hours PRN  pantoprazole  Injectable 40 milliGRAM(s) IV Push daily  atorvastatin 80 milliGRAM(s) Oral at bedtime  insulin glargine Injectable (LANTUS) 10 Unit(s) SubCutaneous at bedtime  insulin lispro (HumaLOG) corrective regimen sliding scale   SubCutaneous Before meals and at bedtime      ICU Vital Signs Last 24 Hrs  T(C): 37.1 (26 Nov 2017 20:10), Max: 37.1 (26 Nov 2017 14:24)  T(F): 98.7 (26 Nov 2017 20:10), Max: 98.8 (26 Nov 2017 14:24)  HR: 72 (27 Nov 2017 03:00) (57 - 82)  BP: 158/72 (27 Nov 2017 03:00) (123/58 - 181/77)  BP(mean): 103 (27 Nov 2017 03:00) (83 - 115)  ABP: --  ABP(mean): --  RR: 20 (27 Nov 2017 03:00) (13 - 24)  SpO2: 99% (27 Nov 2017 03:00) (97% - 100%)      I&O's Detail    25 Nov 2017 07:01  -  26 Nov 2017 07:00  --------------------------------------------------------  IN:    niCARdipine Infusion: 520 mL    sodium chloride 0.9%: 1010 mL    Solution: 50 mL  Total IN: 1580 mL    OUT:  Total OUT: 0 mL    Total NET: 1580 mL      26 Nov 2017 07:01  -  27 Nov 2017 04:00  --------------------------------------------------------  IN:    niCARdipine Infusion: 20 mL    niCARdipine Infusion: 37.5 mL    Oral Fluid: 360 mL    sodium chloride 0.9%: 300 mL  Total IN: 717.5 mL    OUT:  Total OUT: 0 mL    Total NET: 717.5 mL            LABS:                        11.3   7.1   )-----------( 260      ( 26 Nov 2017 04:30 )             33.1     11-26    140  |  102  |  13.0  ----------------------------<  179<H>  3.7   |  24.0  |  0.48<L>    Ca    8.8      26 Nov 2017 04:30  Phos  2.9     11-26  Mg     2.0     11-26        CARDIAC MARKERS ( 25 Nov 2017 11:25 )  x     / <0.01 ng/mL / 136 U/L / x     / x          CAPILLARY BLOOD GLUCOSE      POCT Blood Glucose.: 193 mg/dL (26 Nov 2017 21:51)    PT/INR - ( 25 Nov 2017 11:25 )   PT: 11.9 sec;   INR: 1.08 ratio         PTT - ( 25 Nov 2017 11:25 )  PTT:29.5 sec    CULTURES:      Physical Examination:    General: Awake and alert, pleasant, lying in bed in no acute distress.      HEENT: Pupils equal, reactive to light. Symmetric.    PULM: Clear to auscultation bilaterally, no significant sputum production    CVS: Regular rate and rhythm, no murmurs, rubs, or gallops    ABD: Soft, nondistended, nontender, normoactive bowel sounds, no masses    EXT: No edema, nontender    SKIN: Warm and well perfused, no rashes noted.    NEURO: Alert, oriented with transient disorientation, PERRL, R facial droop, tongue midline, motor RUE 3/5, LUE 5/5, RLE 2/5, LLE 5/5.  strength 5/5 on L, 4/5 on R. RUE pronator drift.     RADIOLOGY: none recent    CRITICAL CARE TIME SPENT: I have spent 30 minutes of critical care time evaluating and treating this patient, including reviewing charts, labs, imagining studies, and collaborating with interdisciplinary team. Patient is a 82y old  Female who presents with a chief complaint of hemorrhagic stroke    BRIEF HOSPITAL COURSE: 81 yo female, PMHx GERD, DM, HTN, HLD, found unresponsive by son. Per son, patient stated she fell earlier in the day. Last seen normal approx 10 PM the night prior. Found to have left basal ganglia hemorrhage with intraventricular extension.  At baseline patient lives alone and is independent with her ADLs. On ASA/Plavix. ROS unable to obtain as patient aphasic.     Events last 24 hours: Patient off Cardene overnight. Neuro exam stable. Started on Lantus for hyperglycemia on sliding scale, hgbA1C 10.3 on oral therapy as outpatient.      PAST MEDICAL & SURGICAL HISTORY:  GERD (gastroesophageal reflux disease)  Diabetes mellitus  High cholesterol  Hypertension      Review of Systems:  CONSTITUTIONAL: No fever, chills, or fatigue  EYES: No eye pain, visual disturbances, or discharge  ENMT:  No difficulty hearing, tinnitus, vertigo; No sinus or throat pain  NECK: No pain or stiffness  RESPIRATORY: No cough, wheezing, chills or hemoptysis; No shortness of breath  CARDIOVASCULAR: No chest pain, palpitations, dizziness, or leg swelling  GASTROINTESTINAL: No abdominal or epigastric pain. No nausea, vomiting, or hematemesis; No diarrhea or constipation. No melena or hematochezia.  GENITOURINARY: No dysuria, frequency, hematuria, or incontinence  NEUROLOGICAL: No headaches, memory loss, loss of strength, numbness, or tremors  SKIN: No itching, burning, rashes, or lesions   MUSCULOSKELETAL: No joint pain or swelling; No muscle, back, or extremity pain  PSYCHIATRIC: No depression, anxiety, mood swings, or difficulty sleeping      Medications:  cloNIDine 0.2 milliGRAM(s) Oral daily  hydrALAZINE Injectable 10 milliGRAM(s) IV Push every 4 hours PRN  losartan 25 milliGRAM(s) Oral daily  metoprolol     tartrate 25 milliGRAM(s) Oral two times a day  acetaminophen  Suppository 650 milliGRAM(s) Rectal every 6 hours PRN  pantoprazole  Injectable 40 milliGRAM(s) IV Push daily  atorvastatin 80 milliGRAM(s) Oral at bedtime  insulin glargine Injectable (LANTUS) 10 Unit(s) SubCutaneous at bedtime  insulin lispro (HumaLOG) corrective regimen sliding scale   SubCutaneous Before meals and at bedtime      ICU Vital Signs Last 24 Hrs  T(C): 37.1 (26 Nov 2017 20:10), Max: 37.1 (26 Nov 2017 14:24)  T(F): 98.7 (26 Nov 2017 20:10), Max: 98.8 (26 Nov 2017 14:24)  HR: 72 (27 Nov 2017 03:00) (57 - 82)  BP: 158/72 (27 Nov 2017 03:00) (123/58 - 181/77)  BP(mean): 103 (27 Nov 2017 03:00) (83 - 115)  ABP: --  ABP(mean): --  RR: 20 (27 Nov 2017 03:00) (13 - 24)  SpO2: 99% (27 Nov 2017 03:00) (97% - 100%)      I&O's Detail    25 Nov 2017 07:01  -  26 Nov 2017 07:00  --------------------------------------------------------  IN:    niCARdipine Infusion: 520 mL    sodium chloride 0.9%: 1010 mL    Solution: 50 mL  Total IN: 1580 mL    OUT:  Total OUT: 0 mL    Total NET: 1580 mL      26 Nov 2017 07:01  -  27 Nov 2017 04:00  --------------------------------------------------------  IN:    niCARdipine Infusion: 20 mL    niCARdipine Infusion: 37.5 mL    Oral Fluid: 360 mL    sodium chloride 0.9%: 300 mL  Total IN: 717.5 mL    OUT:  Total OUT: 0 mL    Total NET: 717.5 mL            LABS:                        11.3   7.1   )-----------( 260      ( 26 Nov 2017 04:30 )             33.1     11-26    140  |  102  |  13.0  ----------------------------<  179<H>  3.7   |  24.0  |  0.48<L>    Ca    8.8      26 Nov 2017 04:30  Phos  2.9     11-26  Mg     2.0     11-26        CARDIAC MARKERS ( 25 Nov 2017 11:25 )  x     / <0.01 ng/mL / 136 U/L / x     / x          CAPILLARY BLOOD GLUCOSE      POCT Blood Glucose.: 193 mg/dL (26 Nov 2017 21:51)    PT/INR - ( 25 Nov 2017 11:25 )   PT: 11.9 sec;   INR: 1.08 ratio         PTT - ( 25 Nov 2017 11:25 )  PTT:29.5 sec    CULTURES:      Physical Examination:    General: Awake and alert, pleasant, lying in bed in no acute distress.      HEENT: Pupils equal, reactive to light. Symmetric.    PULM: Clear to auscultation bilaterally, no significant sputum production    CVS: Regular rate and rhythm, no murmurs, rubs, or gallops    ABD: Soft, nondistended, nontender, normoactive bowel sounds, no masses    EXT: No edema, nontender    SKIN: Warm and well perfused, no rashes noted.    NEURO: Alert, oriented with transient disorientation, PERRL, R facial droop, tongue midline, motor RUE 3/5, LUE 5/5, RLE 2/5, LLE 5/5.  strength 5/5 on L, 4/5 on R. RUE pronator drift.     RADIOLOGY: none recent    CRITICAL CARE TIME SPENT: I have spent 30 minutes of critical care time evaluating and treating this patient, including reviewing charts, labs, imagining studies, and collaborating with interdisciplinary team.

## 2017-11-28 LAB
ANION GAP SERPL CALC-SCNC: 12 MMOL/L — SIGNIFICANT CHANGE UP (ref 5–17)
BUN SERPL-MCNC: 16 MG/DL — SIGNIFICANT CHANGE UP (ref 8–20)
CALCIUM SERPL-MCNC: 8.8 MG/DL — SIGNIFICANT CHANGE UP (ref 8.6–10.2)
CHLORIDE SERPL-SCNC: 106 MMOL/L — SIGNIFICANT CHANGE UP (ref 98–107)
CO2 SERPL-SCNC: 23 MMOL/L — SIGNIFICANT CHANGE UP (ref 22–29)
CREAT SERPL-MCNC: 0.55 MG/DL — SIGNIFICANT CHANGE UP (ref 0.5–1.3)
GLUCOSE BLDC GLUCOMTR-MCNC: 163 MG/DL — HIGH (ref 70–99)
GLUCOSE BLDC GLUCOMTR-MCNC: 176 MG/DL — HIGH (ref 70–99)
GLUCOSE BLDC GLUCOMTR-MCNC: 294 MG/DL — HIGH (ref 70–99)
GLUCOSE BLDC GLUCOMTR-MCNC: 340 MG/DL — HIGH (ref 70–99)
GLUCOSE SERPL-MCNC: 174 MG/DL — HIGH (ref 70–115)
HCT VFR BLD CALC: 32.6 % — LOW (ref 37–47)
HGB BLD-MCNC: 10.7 G/DL — LOW (ref 12–16)
MAGNESIUM SERPL-MCNC: 1.8 MG/DL — SIGNIFICANT CHANGE UP (ref 1.6–2.6)
MCHC RBC-ENTMCNC: 24.7 PG — LOW (ref 27–31)
MCHC RBC-ENTMCNC: 32.8 G/DL — SIGNIFICANT CHANGE UP (ref 32–36)
MCV RBC AUTO: 75.1 FL — LOW (ref 81–99)
PHOSPHATE SERPL-MCNC: 3 MG/DL — SIGNIFICANT CHANGE UP (ref 2.4–4.7)
PLATELET # BLD AUTO: 282 K/UL — SIGNIFICANT CHANGE UP (ref 150–400)
POTASSIUM SERPL-MCNC: 3.7 MMOL/L — SIGNIFICANT CHANGE UP (ref 3.5–5.3)
POTASSIUM SERPL-SCNC: 3.7 MMOL/L — SIGNIFICANT CHANGE UP (ref 3.5–5.3)
RBC # BLD: 4.34 M/UL — LOW (ref 4.4–5.2)
RBC # FLD: 20.4 % — HIGH (ref 11–15.6)
SODIUM SERPL-SCNC: 141 MMOL/L — SIGNIFICANT CHANGE UP (ref 135–145)
WBC # BLD: 7.6 K/UL — SIGNIFICANT CHANGE UP (ref 4.8–10.8)
WBC # FLD AUTO: 7.6 K/UL — SIGNIFICANT CHANGE UP (ref 4.8–10.8)

## 2017-11-28 PROCEDURE — 99222 1ST HOSP IP/OBS MODERATE 55: CPT | Mod: GC

## 2017-11-28 PROCEDURE — 99233 SBSQ HOSP IP/OBS HIGH 50: CPT

## 2017-11-28 RX ORDER — INSULIN GLARGINE 100 [IU]/ML
14 INJECTION, SOLUTION SUBCUTANEOUS AT BEDTIME
Qty: 0 | Refills: 0 | Status: DISCONTINUED | OUTPATIENT
Start: 2017-11-28 | End: 2017-11-30

## 2017-11-28 RX ORDER — AMLODIPINE BESYLATE 2.5 MG/1
10 TABLET ORAL DAILY
Qty: 0 | Refills: 0 | Status: DISCONTINUED | OUTPATIENT
Start: 2017-11-28 | End: 2017-11-30

## 2017-11-28 RX ADMIN — ATORVASTATIN CALCIUM 80 MILLIGRAM(S): 80 TABLET, FILM COATED ORAL at 22:22

## 2017-11-28 RX ADMIN — Medication 2: at 09:32

## 2017-11-28 RX ADMIN — Medication 2: at 22:22

## 2017-11-28 RX ADMIN — Medication 4 UNIT(S): at 09:32

## 2017-11-28 RX ADMIN — AMLODIPINE BESYLATE 5 MILLIGRAM(S): 2.5 TABLET ORAL at 05:57

## 2017-11-28 RX ADMIN — Medication 10 MILLIGRAM(S): at 00:00

## 2017-11-28 RX ADMIN — Medication 4 UNIT(S): at 17:56

## 2017-11-28 RX ADMIN — INSULIN GLARGINE 14 UNIT(S): 100 INJECTION, SOLUTION SUBCUTANEOUS at 22:23

## 2017-11-28 RX ADMIN — LOSARTAN POTASSIUM 50 MILLIGRAM(S): 100 TABLET, FILM COATED ORAL at 05:57

## 2017-11-28 RX ADMIN — Medication 4 UNIT(S): at 13:01

## 2017-11-28 RX ADMIN — Medication 8: at 17:56

## 2017-11-28 RX ADMIN — Medication 10 MILLIGRAM(S): at 05:58

## 2017-11-28 RX ADMIN — PANTOPRAZOLE SODIUM 40 MILLIGRAM(S): 20 TABLET, DELAYED RELEASE ORAL at 14:02

## 2017-11-28 RX ADMIN — Medication 6: at 13:00

## 2017-11-28 NOTE — CONSULT NOTE ADULT - ATTENDING COMMENTS
Patient seen with . History from chart , as patient is a poor historian    82 year old female with AMS and workup revealing left basal ganglia bleed with IVH. Most likely due to HTN  Patient with right inattention, dysarthria, expressive deficits, right hemiparesis UE> LE     W Patient seen with . History from chart , as patient is a poor historian    82 year old female with AMS and workup revealing left basal ganglia bleed with IVH. Most likely due to HTN  Patient with right inattention, dysarthria, expressive deficits, right hemiparesis UE> LE     Patient will need inpatient rehab- will fu progress with bedside therapy and make recommendations. fu in am.  Thank you Patient seen with . History from chart , as patient is a poor historian    82 year old female with AMS and workup revealing left basal ganglia bleed with IVH. Most likely due to HTN  Patient with right inattention, dysarthria, expressive deficits, right hemiparesis UE> LE     Patient will need inpatient rehab- will fu progress with bedside therapy and make recommendations. fu in am.  Will need to verify support in community and prior functional status  Thank you

## 2017-11-28 NOTE — CONSULT NOTE ADULT - SUBJECTIVE AND OBJECTIVE BOX
HPI:  Ms. Naik is an 82 year old female who was on ASA/Plavix at home was brought in to [facility] on 11/25 after being found down on the floor by her son. Last known well time was 10PM the night prior to admission. Per documentation, noted to have elevated blood pressure with SBP in 200's. 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 No neurosurgical intervention. Repeat CTH showed no acute change. MRA revealed no large vessel obstruction. MRI pending. Was notable for bradycardia, anti-hypertensives were adjusted (taken off beta-blockers and calcium channel blocker added).    Hospital course notable for urinary retention requiring straight cath.    Today,    REVIEW OF SYSTEMS  Constitutional - No fever, No fatigue  HEENT - No visual disturbances, No difficulty hearing,  No neck pain  Respiratory - No cough, No wheezing, No shortness of breath  Cardiovascular - No chest pain, No palpitations  Gastrointestinal - No abdominal pain, No nausea, No vomiting, No diarrhea, No constipation  Genitourinary - No dysuria, No frequency, No hematuria, No incontinence  Neurological - No headaches, No loss of strength, No numbness  Skin - No itching, No rashes  Endocrine - No temperature intolerance  Musculoskeletal - No joint pain, No joint swelling, No muscle pain  Psychiatric - No depression, No anxiety  All other review of systems negative    PAST MEDICAL & SURGICAL HISTORY  Gastroesophageal reflux disease  Diabetes mellitus  High cholesterol  Hypertension    SOCIAL HISTORY  Smoking - Denied  EtOH - Denied   Drugs - Denied    FUNCTIONAL HISTORY  _______ hand dominant  Lives alone in an apartment without any stairs to negotiate   Independent in ambulation, ADL's, transfers prior to hospitalization    CURRENT FUNCTIONAL STATUS  Bed mobility - Max A  Transfers - Max A using RW  Gait - 2 steps with Max A x 2     FAMILY HISTORY   Reviewed and non-contributory    ALLERGIES  No Known Allergies    VITALS  T(C): 37.3 (11-28-17 @ 08:00)  T(F): 99.1 (11-28-17 @ 08:00), Max: 99.2 (11-28-17 @ 00:03)  HR: 61 (11-28-17 @ 08:00) (54 - 76)  BP: 182/77 (11-28-17 @ 08:00) (144/65 - 196/80)  RR:  (17 - 20)  SpO2:  (97% - 100%)  Wt(kg): --    PHYSICAL EXAM      RECENT LABS/IMAGING             10.7   7.6   )-----------( 282      ( 28 Nov 2017 08:15 )             32.6     141  |  106  |  16.0  ----------------------------<  174<H>  3.7   |  23.0  |  0.55    Ca    8.8      28 Nov 2017 08:15  Phos  3.0     11-28  Mg     1.8     11-28    HbA1C - 10.3  CHL - 111  Tri - 58  HDL - 68  LDL - 31    TTE (11/26) - EF = 60-65%, grade I diastolic dysfunction, moderate concentric LVH    MEDICATIONS   MEDICATIONS  (STANDING):  amLODIPine   Tablet 5 milliGRAM(s) Oral daily  atorvastatin 80 milliGRAM(s) Oral at bedtime  cloNIDine Patch 0.2 mG/24Hr(s) 1 patch Topical every 7 days  insulin glargine Injectable (LANTUS) 10 Unit(s) SubCutaneous at bedtime  insulin lispro (HumaLOG) corrective regimen sliding scale   SubCutaneous Before meals and at bedtime  insulin lispro Injectable (HumaLOG) 4 Unit(s) SubCutaneous three times a day before meals  losartan 50 milliGRAM(s) Oral daily  pantoprazole  Injectable 40 milliGRAM(s) IV Push daily    MEDICATIONS  (PRN):  acetaminophen  Suppository 650 milliGRAM(s) Rectal every 6 hours PRN For Temp greater than 38 C (100.4 F)  hydrALAZINE Injectable 10 milliGRAM(s) IV Push every 4 hours PRN SBP > 160    ASSESSMENT/PLAN  81 y/o female found on the floor who was found to have 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 She is now with functional, gait, ADL impairments.    Disposition -  PT - ROM, Bed mobility, Transfers, Ambulation with assistive device  OT - ADLs, ROM  SLP - Dysphagia eval and treat  Precautions - Falls, Cardiac, Aspiration  Pain control - Tylenol PRN  DVT Prophylaxis - SCD's  Weight bearing status - Full weight bearing  Skin - Turn Q2hrs  Diet - Puree/nectar (CC with evening snack) + Glucerna tID HPI:  Ms. Naik is an 82 year old female who was on ASA/Plavix at home was brought in to [facility] on 11/25 after being found down on the floor by her son. Last known well time was 10PM the night prior to admission. Per documentation, noted to have elevated blood pressure with SBP in 200's. 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 No neurosurgical intervention. Repeat CTH showed no acute change. MRA revealed no large vessel obstruction. MRI pending. Was notable for bradycardia, anti-hypertensives were adjusted (taken off beta-blockers and calcium channel blocker added).    Hospital course notable for urinary retention requiring straight cath.    Today, she reports that she is doing well. Does not recall why she came to the hospital but understands that she was sick and was brought here. Denies any pain. Reports some numbness in her right extremities.    REVIEW OF SYSTEMS  Constitutional - No fever, +Fatigue  HEENT - No visual disturbances, No neck pain  Respiratory - No cough, No wheezing, No shortness of breath  Cardiovascular - No chest pain, No palpitations  Gastrointestinal - No abdominal pain, No nausea, No vomiting, No diarrhea, No constipation  Genitourinary - No dysuria, No frequency, No incontinence  Neurological - No headaches, +right sided loss of strength, +right sided numbness  Skin - No itching, No rashes  Endocrine - No temperature intolerance  Musculoskeletal - No joint pain, No joint swelling, No muscle pain  Psychiatric - No depression, No anxiety  All other review of systems negative    PAST MEDICAL & SURGICAL HISTORY  Gastroesophageal reflux disease  Diabetes mellitus  High cholesterol  Hypertension    SOCIAL HISTORY  Retired  Smoking - Denied  EtOH - Denied   Drugs - Denied    FUNCTIONAL HISTORY  Right hand dominant  Lives alone in an apartment with 1 COMFORT and no stairs inside to negotiate   Independent in ambulation, ADL's, transfers prior to hospitalization    CURRENT FUNCTIONAL STATUS  Bed mobility - Max A  Transfers - Max A using RW  Gait - 2 steps with Max A x 2     FAMILY HISTORY   Reviewed and non-contributory    ALLERGIES  No Known Allergies    VITALS  T(C): 37.3 (11-28-17 @ 08:00)  T(F): 99.1 (11-28-17 @ 08:00), Max: 99.2 (11-28-17 @ 00:03)  HR: 61 (11-28-17 @ 08:00) (54 - 76)  BP: 182/77 (11-28-17 @ 08:00) (144/65 - 196/80)  RR:  (17 - 20)  SpO2:  (97% - 100%)  Wt(kg): --    PHYSICAL EXAM  Constitutional - NAD, Comfortable, Malnourished  HEENT - NCAT, EOMI  Neck - Supple  Chest - CTA bilaterally  Cardiovascular - RRR, S1S2  Abdomen - BS+, Soft, NTND  Extremities - No C/C/E, No calf tenderness   Neurologic Exam -                    Cognitive - Awake, Alert, Oriented to self, hospital, not month, year, or situation, Limited command following/understanding     Communication - Fluent, No dysarthria, Naming and repetition in tact     Attention - impaired, unable to recite days of weeks backwards     Memory - impaired, unable to recall 3/3 items after 5 minutes     Cranial Nerves - Right sided facial droop, impaired right shoulder shrug     Motor - ?Ataxia vs. limited by command comprehension                    LEFT    UE - ShAB 5/5, EF 5/5, EE 5/5, WE 5/5,  5/5                    RIGHT UE - ShAB 4/5, EF 5/5, EE 4/5, WE 4/5,  4/5                    LEFT    LE - HF 5/5, KE 5/5, DF 5/5, PF 5/5                    RIGHT LE - HF 3/5, KE 5/5, DF 5/5, PF 5/5        Sensory - Impaired to light touch in right sided extremities     Reflexes - DTR intact and symmetrical, Negative Grajeda's bilaterally     Coordination - Finger-to-nose impaired with right upper extremity  Psychiatric - Affect WNL    RECENT LABS/IMAGING             10.7   7.6   )-----------( 282      ( 28 Nov 2017 08:15 )             32.6     141  |  106  |  16.0  ----------------------------<  174<H>  3.7   |  23.0  |  0.55    Ca    8.8      28 Nov 2017 08:15  Phos  3.0     11-28  Mg     1.8     11-28    HbA1C - 10.3  CHL - 111  Tri - 58  HDL - 68  LDL - 31    TTE (11/26) - EF = 60-65%, grade I diastolic dysfunction, moderate concentric LVH    MEDICATIONS   MEDICATIONS  (STANDING):  amLODIPine   Tablet 5 milliGRAM(s) Oral daily  atorvastatin 80 milliGRAM(s) Oral at bedtime  cloNIDine Patch 0.2 mG/24Hr(s) 1 patch Topical every 7 days  insulin glargine Injectable (LANTUS) 10 Unit(s) SubCutaneous at bedtime  insulin lispro (HumaLOG) corrective regimen sliding scale   SubCutaneous Before meals and at bedtime  insulin lispro Injectable (HumaLOG) 4 Unit(s) SubCutaneous three times a day before meals  losartan 50 milliGRAM(s) Oral daily  pantoprazole  Injectable 40 milliGRAM(s) IV Push daily    MEDICATIONS  (PRN):  acetaminophen  Suppository 650 milliGRAM(s) Rectal every 6 hours PRN For Temp greater than 38 C (100.4 F)  hydrALAZINE Injectable 10 milliGRAM(s) IV Push every 4 hours PRN SBP > 160    ASSESSMENT/PLAN  81 y/o female found on the floor who was found to have 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 She is now with functional, gait, ADL impairments.    Disposition -   PT - ROM, Bed mobility, Transfers, Ambulation with assistive device  OT - ADLs, ROM  SLP - Dysphagia eval and treat  Precautions - Falls, Cardiac, Aspiration  Pain control - Tylenol PRN  DVT Prophylaxis - SCD's  Weight bearing status - Full weight bearing  Skin - Turn Q2hrs  Diet - Puree/nectar (CC with evening snack) + Glucerna TID HPI:  Ms. Naik is an 82 year old female who was on ASA/Plavix at home was brought in to [facility] on 11/25 after being found down on the floor by her son. Last known well time was 10PM the night prior to admission. Per documentation, noted to have elevated blood pressure with SBP in 200's. 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 No neurosurgical intervention. Repeat CTH showed no acute change. MRA revealed no large vessel obstruction. MRI pending. Was notable for bradycardia, anti-hypertensives were adjusted (taken off beta-blockers and calcium channel blocker added).    Hospital course notable for urinary retention requiring straight cath.    Today, she reports that she is doing well. Does not recall why she came to the hospital but understands that she was sick and was brought here. Denies any pain. Reports some numbness in her right extremities.    REVIEW OF SYSTEMS  Constitutional - No fever, +Fatigue  HEENT - No visual disturbances, No neck pain  Respiratory - No cough, No wheezing, No shortness of breath  Cardiovascular - No chest pain, No palpitations  Gastrointestinal - No abdominal pain, No nausea, No vomiting, No diarrhea, No constipation  Genitourinary - No dysuria, No frequency, No incontinence  Neurological - No headaches, +right sided loss of strength, +right sided numbness  Skin - No itching, No rashes  Endocrine - No temperature intolerance  Musculoskeletal - No joint pain, No joint swelling, No muscle pain  Psychiatric - No depression, No anxiety  All other review of systems negative    PAST MEDICAL & SURGICAL HISTORY  Gastroesophageal reflux disease  Diabetes mellitus  High cholesterol  Hypertension    SOCIAL HISTORY  Retired  Smoking - Denied  EtOH - Denied   Drugs - Denied    FUNCTIONAL HISTORY  Right hand dominant  Lives alone in an apartment with 1 COMFORT and no stairs inside to negotiate   Independent in ambulation, ADL's, transfers prior to hospitalization    CURRENT FUNCTIONAL STATUS  Bed mobility - Max A  Transfers - Max A using RW  Gait - 2 steps with Max A x 2     FAMILY HISTORY   Reviewed and non-contributory    ALLERGIES  No Known Allergies    VITALS  T(C): 37.3 (11-28-17 @ 08:00)  T(F): 99.1 (11-28-17 @ 08:00), Max: 99.2 (11-28-17 @ 00:03)  HR: 61 (11-28-17 @ 08:00) (54 - 76)  BP: 182/77 (11-28-17 @ 08:00) (144/65 - 196/80)  RR:  (17 - 20)  SpO2:  (97% - 100%)  Wt(kg): --    PHYSICAL EXAM  Constitutional - NAD, Comfortable, Malnourished  HEENT - NCAT, EOMI  Neck - Supple  Chest - CTA bilaterally  Cardiovascular - RRR, S1S2  Abdomen - BS+, Soft, NTND  Extremities - No C/C/E, No calf tenderness   Neurologic Exam -                    Cognitive - Awake, Alert, Oriented to self, hospital, not birthday, month, year, or situation, Limited command following/understanding, Right sided inattention     Communication - Naming impaired     Attention - impaired, unable to recite days of weeks backwards     Memory - impaired, unable to recall 3/3 items after 5 minutes     Cranial Nerves - Right sided facial droop, impaired right shoulder shrug     Motor - Ataxia + limited by command comprehension                    LEFT    UE - ShAB 5/5, EF 5/5, EE 5/5, WE 5/5,  5/5                    RIGHT UE - ShAB 2/5, EF 3/5, EE 3/5, WE 3/5,  3/5                    LEFT    LE - HF 5/5, KE 5/5, DF 5/5, PF 5/5                    RIGHT LE - HF 3/5, KE 4/5, DF 5/5, PF 5/5        Sensory - Impaired to light touch in right sided extremities     Reflexes - DTR intact and symmetrical, Negative Grajeda's bilaterally     Coordination - Finger-to-nose impaired with right upper extremity  Psychiatric - Affect WNL    RECENT LABS/IMAGING             10.7   7.6   )-----------( 282      ( 28 Nov 2017 08:15 )             32.6     141  |  106  |  16.0  ----------------------------<  174<H>  3.7   |  23.0  |  0.55    Ca    8.8      28 Nov 2017 08:15  Phos  3.0     11-28  Mg     1.8     11-28    HbA1C - 10.3  CHL - 111  Tri - 58  HDL - 68  LDL - 31    TTE (11/26) - EF = 60-65%, grade I diastolic dysfunction, moderate concentric LVH    MEDICATIONS   MEDICATIONS  (STANDING):  amLODIPine   Tablet 5 milliGRAM(s) Oral daily  atorvastatin 80 milliGRAM(s) Oral at bedtime  cloNIDine Patch 0.2 mG/24Hr(s) 1 patch Topical every 7 days  insulin glargine Injectable (LANTUS) 10 Unit(s) SubCutaneous at bedtime  insulin lispro (HumaLOG) corrective regimen sliding scale   SubCutaneous Before meals and at bedtime  insulin lispro Injectable (HumaLOG) 4 Unit(s) SubCutaneous three times a day before meals  losartan 50 milliGRAM(s) Oral daily  pantoprazole  Injectable 40 milliGRAM(s) IV Push daily    MEDICATIONS  (PRN):  acetaminophen  Suppository 650 milliGRAM(s) Rectal every 6 hours PRN For Temp greater than 38 C (100.4 F)  hydrALAZINE Injectable 10 milliGRAM(s) IV Push every 4 hours PRN SBP > 160    ASSESSMENT/PLAN  83 y/o female found on the floor who was found to have 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 She is now with functional, gait, ADL impairments.    Disposition - Continue bedside therapy. Will continue to follow patient to make final rehabilitation recommendations.  PT - ROM, Bed mobility, Transfers, Ambulation with assistive device  OT - ADLs, ROM  SLP - Dysphagia eval and treat  Precautions - Falls, Cardiac, Aspiration  Pain control - Tylenol PRN  DVT Prophylaxis - SCD's  Weight bearing status - Full weight bearing  Skin - Turn Q2hrs  Diet - Puree/nectar (CC with evening snack) + Glucerna TID HPI:  Ms. Naik is an 82 year old female who was on ASA/Plavix at home was brought in to [facility] on 11/25 after being found down on the floor by her son. Last known well time was 10PM the night prior to admission. Per documentation, noted to have elevated blood pressure with SBP in 200's. 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 No neurosurgical intervention. Repeat CTH showed no acute change. MRA revealed no large vessel obstruction. MRI pending. Was notable for bradycardia, anti-hypertensives were adjusted (taken off beta-blockers and calcium channel blocker added).    Hospital course notable for urinary retention requiring straight cath.    Today, she reports that she is doing well. Does not recall why she came to the hospital but understands that she was sick and was brought here. Denies any pain. Reports some numbness in her right extremities.    REVIEW OF SYSTEMS  Constitutional - No fever, +Fatigue  HEENT - No visual disturbances, No neck pain  Respiratory - No cough, No wheezing, No shortness of breath  Cardiovascular - No chest pain, No palpitations  Gastrointestinal - No abdominal pain, No nausea, No vomiting, No diarrhea, No constipation  Genitourinary - No dysuria, No frequency, No incontinence  Neurological - No headaches, +right sided loss of strength, +right sided numbness  Skin - No itching, No rashes  Endocrine - No temperature intolerance  Musculoskeletal - No joint pain, No joint swelling, No muscle pain  Psychiatric - No depression, No anxiety  All other review of systems negative    PAST MEDICAL & SURGICAL HISTORY  Gastroesophageal reflux disease  Diabetes mellitus  High cholesterol  Hypertension    SOCIAL HISTORY  Retired  Smoking - Denied  EtOH - Denied   Drugs - Denied    FUNCTIONAL HISTORY  Right hand dominant  Lives alone in an apartment with 1 COMFORT and no stairs inside to negotiate   Independent in ambulation, ADL's, transfers prior to hospitalization    CURRENT FUNCTIONAL STATUS  Bed mobility - Max A  Transfers - Max A using RW  Gait - 2 steps with Max A x 2     FAMILY HISTORY   Reviewed and non-contributory    ALLERGIES  No Known Allergies    VITALS  T(C): 37.3 (11-28-17 @ 08:00)  T(F): 99.1 (11-28-17 @ 08:00), Max: 99.2 (11-28-17 @ 00:03)  HR: 61 (11-28-17 @ 08:00) (54 - 76)  BP: 182/77 (11-28-17 @ 08:00) (144/65 - 196/80)  RR:  (17 - 20)  SpO2:  (97% - 100%)  Wt(kg): --    PHYSICAL EXAM  Constitutional - NAD, Comfortable, Malnourished  HEENT - NCAT, EOMI  Neck - Supple  Chest - CTA bilaterally  Cardiovascular - RRR, S1S2  Abdomen - BS+, Soft, NTND  Extremities - No C/C/E, No calf tenderness   Neurologic Exam -                    Cognitive - Awake, Alert, Oriented to self, hospital, not birthday, month, year, or situation, Limited command following/understanding, Right sided inattention     Communication - Naming impaired     Attention - impaired, unable to recite days of weeks backwards     Memory - impaired, unable to recall 3/3 items after 5 minutes     Cranial Nerves - Right sided facial droop, impaired right shoulder shrug     Motor - Ataxia + limited by command comprehension                    LEFT    UE - ShAB 5/5, EF 5/5, EE 5/5, WE 5/5,  5/5                    RIGHT UE - ShAB 2/5, EF 3/5, EE 3/5, WE 3/5,  3/5                    LEFT    LE - HF 5/5, KE 5/5, DF 5/5, PF 5/5                    RIGHT LE - HF 3/5, KE 4/5, DF 5/5, PF 5/5        Sensory - Impaired to light touch in right sided extremities     Reflexes - DTR intact and symmetrical, Negative Grajeda's bilaterally     Coordination - Finger-to-nose impaired with right upper extremity  Psychiatric - Affect WNL    RECENT LABS/IMAGING             10.7   7.6   )-----------( 282      ( 28 Nov 2017 08:15 )             32.6     141  |  106  |  16.0  ----------------------------<  174<H>  3.7   |  23.0  |  0.55    Ca    8.8      28 Nov 2017 08:15  Phos  3.0     11-28  Mg     1.8     11-28    HbA1C - 10.3  CHL - 111  Tri - 58  HDL - 68  LDL - 31    TTE (11/26) - EF = 60-65%, grade I diastolic dysfunction, moderate concentric LVH    MEDICATIONS   MEDICATIONS  (STANDING):  amLODIPine   Tablet 5 milliGRAM(s) Oral daily  atorvastatin 80 milliGRAM(s) Oral at bedtime  cloNIDine Patch 0.2 mG/24Hr(s) 1 patch Topical every 7 days  insulin glargine Injectable (LANTUS) 10 Unit(s) SubCutaneous at bedtime  insulin lispro (HumaLOG) corrective regimen sliding scale   SubCutaneous Before meals and at bedtime  insulin lispro Injectable (HumaLOG) 4 Unit(s) SubCutaneous three times a day before meals  losartan 50 milliGRAM(s) Oral daily  pantoprazole  Injectable 40 milliGRAM(s) IV Push daily    MEDICATIONS  (PRN):  acetaminophen  Suppository 650 milliGRAM(s) Rectal every 6 hours PRN For Temp greater than 38 C (100.4 F)  hydrALAZINE Injectable 10 milliGRAM(s) IV Push every 4 hours PRN SBP > 160    ASSESSMENT/PLAN  81 y/o female found on the floor who was found to have 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 She is now with functional, gait, ADL, balance, inattention, memory impairments.    Disposition - Continue bedside therapy. Will continue to follow patient to make final rehabilitation recommendations.  PT - ROM, Bed mobility, Transfers, Ambulation with assistive device  OT - ADLs, ROM  SLP - Dysphagia eval and treat  Precautions - Falls, Cardiac, Aspiration  Pain control - Tylenol PRN  DVT Prophylaxis - SCD's  Weight bearing status - Full weight bearing  Skin - Turn Q2hrs  Diet - Puree/nectar (CC with evening snack) + Glucerna TID HPI:  Ms. Naik is an 82 year old female who was on ASA/Plavix at home was brought in to [facility] on 11/25 after being found down on the floor by her son. Last known well time was 10PM the night prior to admission. Per documentation, noted to have elevated blood pressure with SBP in 200's. 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 No neurosurgical intervention. Repeat CTH showed no acute change. MRA revealed no large vessel obstruction. MRI pending. Was notable for bradycardia, anti-hypertensives were adjusted (taken off beta-blockers and calcium channel blocker added).    Hospital course notable for urinary retention requiring straight cath.    Today, she reports that she is doing well. Does not recall why she came to the hospital but understands that she was sick and was brought here. Denies any pain. Reports some numbness in her right extremities.    REVIEW OF SYSTEMS  Constitutional - No fever, +Fatigue  HEENT - No visual disturbances, No neck pain  Respiratory - No cough, No wheezing, No shortness of breath  Cardiovascular - No chest pain, No palpitations  Gastrointestinal - No abdominal pain, No nausea, No vomiting, No diarrhea, No constipation  Genitourinary - No dysuria, No frequency, No incontinence  Neurological - No headaches, +right sided loss of strength, +right sided numbness  Skin - No itching, No rashes  Musculoskeletal - No joint pain, No joint swelling, No muscle pain  Psychiatric - No depression, No anxiety  All other review of systems negative    PAST MEDICAL & SURGICAL HISTORY  Gastroesophageal reflux disease  Diabetes mellitus  High cholesterol  Hypertension    SOCIAL HISTORY  Retired  Smoking - Denied  EtOH - Denied   Drugs - Denied    FUNCTIONAL HISTORY  Right hand dominant  Lives alone in an apartment with 1 COMFORT and no stairs inside to negotiate   Independent in ambulation, ADL's, transfers prior to hospitalization    CURRENT FUNCTIONAL STATUS  Bed mobility - Max A  Transfers - Max A using RW  Gait - 2 steps with Max A x 2     FAMILY HISTORY   Reviewed and non-contributory    ALLERGIES  No Known Allergies    VITALS  T(C): 37.3 (11-28-17 @ 08:00)  T(F): 99.1 (11-28-17 @ 08:00), Max: 99.2 (11-28-17 @ 00:03)  HR: 61 (11-28-17 @ 08:00) (54 - 76)  BP: 182/77 (11-28-17 @ 08:00) (144/65 - 196/80)  RR:  (17 - 20)  SpO2:  (97% - 100%)  Wt(kg): --    PHYSICAL EXAM  Constitutional - NAD, Comfortable, Malnourished  HEENT - NCAT, EOMI  Neck - Supple  Chest - CTA bilaterally  Cardiovascular - RRR, S1S2  Abdomen - BS+, Soft, NTND  Extremities - No C/C/E, No calf tenderness   Neurologic Exam -                    Cognitive - Awake, Alert, Oriented to self, hospital, not birthday, month, year, or situation, Limited command following/understanding, Right sided inattention     Communication - Naming impaired     Attention - impaired, unable to recite days of weeks backwards     Memory - impaired, unable to recall 3/3 items after 5 minutes     Cranial Nerves - Right sided facial droop, impaired right shoulder shrug     Motor - Ataxia + limited by command comprehension                    LEFT    UE - ShAB 5/5, EF 5/5, EE 5/5, WE 5/5,  5/5                    RIGHT UE - ShAB 2/5, EF 3/5, EE 3/5, WE 3/5,  3/5                    LEFT    LE - HF 5/5, KE 5/5, DF 5/5, PF 5/5                    RIGHT LE - HF 3/5, KE 4/5, DF 5/5, PF 5/5        Sensory - Impaired to light touch in right sided extremities     Reflexes - DTR intact and symmetrical, Negative Grajeda's bilaterally     Coordination - Finger-to-nose impaired with right upper extremity  Psychiatric - Affect WNL    RECENT LABS/IMAGING             10.7   7.6   )-----------( 282      ( 28 Nov 2017 08:15 )             32.6     141  |  106  |  16.0  ----------------------------<  174<H>  3.7   |  23.0  |  0.55    Ca    8.8      28 Nov 2017 08:15  Phos  3.0     11-28  Mg     1.8     11-28    HbA1C - 10.3  CHL - 111  Tri - 58  HDL - 68  LDL - 31    TTE (11/26) - EF = 60-65%, grade I diastolic dysfunction, moderate concentric LVH    MEDICATIONS   MEDICATIONS  (STANDING):  amLODIPine   Tablet 5 milliGRAM(s) Oral daily  atorvastatin 80 milliGRAM(s) Oral at bedtime  cloNIDine Patch 0.2 mG/24Hr(s) 1 patch Topical every 7 days  insulin glargine Injectable (LANTUS) 10 Unit(s) SubCutaneous at bedtime  insulin lispro (HumaLOG) corrective regimen sliding scale   SubCutaneous Before meals and at bedtime  insulin lispro Injectable (HumaLOG) 4 Unit(s) SubCutaneous three times a day before meals  losartan 50 milliGRAM(s) Oral daily  pantoprazole  Injectable 40 milliGRAM(s) IV Push daily    MEDICATIONS  (PRN):  acetaminophen  Suppository 650 milliGRAM(s) Rectal every 6 hours PRN For Temp greater than 38 C (100.4 F)  hydrALAZINE Injectable 10 milliGRAM(s) IV Push every 4 hours PRN SBP > 160    ASSESSMENT/PLAN  83 y/o female found on the floor who was found to have 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 She is now with functional, gait, ADL, balance, inattention, memory impairments.    Disposition - Continue bedside therapy. Will continue to follow patient to make final rehabilitation recommendations.  PT - ROM, Bed mobility, Transfers, Ambulation with assistive device  OT - ADLs, ROM  SLP - Dysphagia eval and treat  Precautions - Falls, Cardiac, Aspiration  Pain control - Tylenol PRN  DVT Prophylaxis - SCD's  Weight bearing status - Full weight bearing  Skin - Turn Q2hrs  Diet - Puree/nectar (CC with evening snack) + Glucerna TID

## 2017-11-28 NOTE — ADVANCED PRACTICE NURSE CONSULT - RECOMMEDATIONS
continue diabetes self management education w the care provider  cc- home care for diabetes management education

## 2017-11-28 NOTE — PROGRESS NOTE ADULT - ASSESSMENT
83 yo female, PMHx GERD, DM, HTN, HLD, found unresponsive admitted with hypertensive emergency and L basal ganglia hemorrhage secondary to hypertension, with uncontrolled hyperglycemia.    > L basal ganglia hemorrhage - Neurology, Neurosurgery input appreciated.  Continue to observe off antiplatelets.  MRA grossly unremarkable.    > HTN - Increase Norvasc for better BP control.    > GERD - continue PPI.  > Diabetes - monitor fingersticks.  Insulin coverage for hyperglycemia.  Increase Lantus  > HLD - continue Statin  > Urinary Retention - PVR was 500ml.  Straight cath and observe for retention.   > DVT Prophylaxis - Lower extremity intermittent compression devices.  Likely Acute / FAM placement in am.

## 2017-11-28 NOTE — PROGRESS NOTE ADULT - SUBJECTIVE AND OBJECTIVE BOX
Patient: ZION LEACH [id number] 82y Female                 Internal Medicine Hospitalist Progress Note - Dr. Tereso Grady    Chief Complaint: Patient is a 82y old  Female who presents with a chief complaint of   HPI:  81 yo female, PMHx GERD, DM, HTN, HLD, found unresponsive by son. Per son, patient stated she fell earlier in the day. Last seen normal approx 10 PM the night prior. Found to have left basal ganglia hemorrhage with intraventricular extension.  At baseline patient lives alone and is independent with her ADLs. On ASA/Plavix. Seen by neurology and neurosurgery, admitted initially to MICU.  Speech and mental status seemed to improve.      Interim history: Denies specific complaints.  No pain.  No new weakness / numbness.      ____________________PHYSICAL EXAM:  Vitals reviewed as indicated below  GENERAL:  NAD Alert and Oriented x 2 to person, place.   HEENT: NCAT  CARDIOVASCULAR:  S1, S2  LUNGS: CTAB  ABDOMEN:  soft, (-) tenderness, (-) distension, (+) bowel sounds, (-) guarding, (-) rebound (-) rigidity  EXTREMITIES:  no cyanosis / clubbing / edema.   NEURO: RUE / RLE strength 0/5.  R handgrip 1/5.  LUE / LLE strength 5/5.  L facial droop.  Slurred speech  ____________________    VITALS:  Vital Signs Last 24 Hrs  T(C): 37.3 (28 Nov 2017 08:00), Max: 37.3 (28 Nov 2017 00:03)  T(F): 99.1 (28 Nov 2017 08:00), Max: 99.2 (28 Nov 2017 00:03)  HR: 61 (28 Nov 2017 08:00) (61 - 76)  BP: 152/60 (28 Nov 2017 11:40) (152/60 - 196/80)  BP(mean): --  RR: 18 (28 Nov 2017 08:00) (18 - 20)  SpO2: 99% (28 Nov 2017 08:00) (97% - 100%) Daily     Daily   CAPILLARY BLOOD GLUCOSE      POCT Blood Glucose.: 294 mg/dL (28 Nov 2017 12:59)  POCT Blood Glucose.: 163 mg/dL (28 Nov 2017 08:26)  POCT Blood Glucose.: 247 mg/dL (27 Nov 2017 21:23)  POCT Blood Glucose.: 201 mg/dL (27 Nov 2017 17:40)    I&O's Summary    27 Nov 2017 07:01  -  28 Nov 2017 07:00  --------------------------------------------------------  IN: 890 mL / OUT: 500 mL / NET: 390 mL    28 Nov 2017 07:01  -  28 Nov 2017 15:04  --------------------------------------------------------  IN: 720 mL / OUT: 0 mL / NET: 720 mL        LABS:                        10.7   7.6   )-----------( 282      ( 28 Nov 2017 08:15 )             32.6     11-28    141  |  106  |  16.0  ----------------------------<  174<H>  3.7   |  23.0  |  0.55    Ca    8.8      28 Nov 2017 08:15  Phos  3.0     11-28  Mg     1.8     11-28                  MEDICATIONS:  acetaminophen  Suppository 650 milliGRAM(s) Rectal every 6 hours PRN  amLODIPine   Tablet 5 milliGRAM(s) Oral daily  atorvastatin 80 milliGRAM(s) Oral at bedtime  cloNIDine Patch 0.2 mG/24Hr(s) 1 patch Topical every 7 days  hydrALAZINE Injectable 10 milliGRAM(s) IV Push every 4 hours PRN  insulin glargine Injectable (LANTUS) 10 Unit(s) SubCutaneous at bedtime  insulin lispro (HumaLOG) corrective regimen sliding scale   SubCutaneous Before meals and at bedtime  insulin lispro Injectable (HumaLOG) 4 Unit(s) SubCutaneous three times a day before meals  losartan 50 milliGRAM(s) Oral daily  pantoprazole  Injectable 40 milliGRAM(s) IV Push daily

## 2017-11-29 ENCOUNTER — TRANSCRIPTION ENCOUNTER (OUTPATIENT)
Age: 82
End: 2017-11-29

## 2017-11-29 DIAGNOSIS — G81.90 HEMIPLEGIA, UNSPECIFIED AFFECTING UNSPECIFIED SIDE: ICD-10-CM

## 2017-11-29 DIAGNOSIS — I69.191 DYSPHAGIA FOLLOWING NONTRAUMATIC INTRACEREBRAL HEMORRHAGE: ICD-10-CM

## 2017-11-29 DIAGNOSIS — Z51.5 ENCOUNTER FOR PALLIATIVE CARE: ICD-10-CM

## 2017-11-29 LAB
GLUCOSE BLDC GLUCOMTR-MCNC: 121 MG/DL — HIGH (ref 70–99)
GLUCOSE BLDC GLUCOMTR-MCNC: 218 MG/DL — HIGH (ref 70–99)
GLUCOSE BLDC GLUCOMTR-MCNC: 262 MG/DL — HIGH (ref 70–99)
GLUCOSE BLDC GLUCOMTR-MCNC: 333 MG/DL — HIGH (ref 70–99)

## 2017-11-29 PROCEDURE — 99233 SBSQ HOSP IP/OBS HIGH 50: CPT

## 2017-11-29 PROCEDURE — 99232 SBSQ HOSP IP/OBS MODERATE 35: CPT

## 2017-11-29 RX ORDER — ATORVASTATIN CALCIUM 80 MG/1
1 TABLET, FILM COATED ORAL
Qty: 0 | Refills: 0 | COMMUNITY

## 2017-11-29 RX ORDER — MELOXICAM 15 MG/1
1 TABLET ORAL
Qty: 0 | Refills: 0 | COMMUNITY

## 2017-11-29 RX ORDER — HYDRALAZINE HCL 50 MG
10 TABLET ORAL ONCE
Qty: 0 | Refills: 0 | Status: COMPLETED | OUTPATIENT
Start: 2017-11-29 | End: 2017-11-29

## 2017-11-29 RX ORDER — PANTOPRAZOLE SODIUM 20 MG/1
40 TABLET, DELAYED RELEASE ORAL
Qty: 0 | Refills: 0 | Status: DISCONTINUED | OUTPATIENT
Start: 2017-11-29 | End: 2017-11-30

## 2017-11-29 RX ORDER — ASPIRIN/CALCIUM CARB/MAGNESIUM 324 MG
1 TABLET ORAL
Qty: 0 | Refills: 0 | COMMUNITY

## 2017-11-29 RX ORDER — INSULIN LISPRO 100/ML
4 VIAL (ML) SUBCUTANEOUS
Qty: 0 | Refills: 0 | COMMUNITY
Start: 2017-11-29

## 2017-11-29 RX ORDER — AMLODIPINE BESYLATE 2.5 MG/1
1 TABLET ORAL
Qty: 0 | Refills: 0 | COMMUNITY
Start: 2017-11-29

## 2017-11-29 RX ORDER — INSULIN GLARGINE 100 [IU]/ML
14 INJECTION, SOLUTION SUBCUTANEOUS
Qty: 0 | Refills: 0 | COMMUNITY
Start: 2017-11-29

## 2017-11-29 RX ORDER — ACETAMINOPHEN 500 MG
650 TABLET ORAL EVERY 6 HOURS
Qty: 0 | Refills: 0 | Status: DISCONTINUED | OUTPATIENT
Start: 2017-11-29 | End: 2017-11-30

## 2017-11-29 RX ORDER — CLOPIDOGREL BISULFATE 75 MG/1
0 TABLET, FILM COATED ORAL
Qty: 0 | Refills: 0 | COMMUNITY

## 2017-11-29 RX ORDER — ATORVASTATIN CALCIUM 80 MG/1
1 TABLET, FILM COATED ORAL
Qty: 0 | Refills: 0 | COMMUNITY
Start: 2017-11-29

## 2017-11-29 RX ORDER — METOPROLOL TARTRATE 50 MG
1 TABLET ORAL
Qty: 0 | Refills: 0 | COMMUNITY

## 2017-11-29 RX ADMIN — LOSARTAN POTASSIUM 50 MILLIGRAM(S): 100 TABLET, FILM COATED ORAL at 05:58

## 2017-11-29 RX ADMIN — Medication 4: at 08:40

## 2017-11-29 RX ADMIN — Medication 10 MILLIGRAM(S): at 07:00

## 2017-11-29 RX ADMIN — Medication 10 MILLIGRAM(S): at 00:10

## 2017-11-29 RX ADMIN — ATORVASTATIN CALCIUM 80 MILLIGRAM(S): 80 TABLET, FILM COATED ORAL at 22:25

## 2017-11-29 RX ADMIN — AMLODIPINE BESYLATE 10 MILLIGRAM(S): 2.5 TABLET ORAL at 05:58

## 2017-11-29 RX ADMIN — Medication 4 UNIT(S): at 08:40

## 2017-11-29 RX ADMIN — Medication 8: at 12:04

## 2017-11-29 RX ADMIN — Medication 4 UNIT(S): at 17:39

## 2017-11-29 RX ADMIN — INSULIN GLARGINE 14 UNIT(S): 100 INJECTION, SOLUTION SUBCUTANEOUS at 22:25

## 2017-11-29 RX ADMIN — PANTOPRAZOLE SODIUM 40 MILLIGRAM(S): 20 TABLET, DELAYED RELEASE ORAL at 12:04

## 2017-11-29 RX ADMIN — Medication 10 MILLIGRAM(S): at 01:30

## 2017-11-29 RX ADMIN — Medication 6: at 17:38

## 2017-11-29 RX ADMIN — Medication 4 UNIT(S): at 12:05

## 2017-11-29 NOTE — DISCHARGE NOTE ADULT - PLAN OF CARE
stable for discharge It is important to see your primary physician as well as the physicians noted below within the next week to perform a comprehensive medical review.  Call their offices for an appointment as soon as you leave the hospital.  If you do not have a primary physician, contact the [facility] at [place] [phone] located on [address] [place] [address].  Your medical issues appear to be stable at this time, but if your symptoms recur or worsen, contact your physicians and/or return to the hospital if necessary.  If you encounter any issues or questions with your medication, call your physicians before stopping the medication.  Do not drive.  Limit your diet to 2 grams of sodium daily.

## 2017-11-29 NOTE — DISCHARGE NOTE ADULT - MEDICATION SUMMARY - MEDICATIONS TO STOP TAKING
I will STOP taking the medications listed below when I get home from the hospital:    aspirin 325 mg oral tablet  -- 1 tab(s) by mouth once a day    glipiZIDE 10 mg oral tablet  -- 1 tab(s) by mouth once a day    Janumet XR 50 mg-1000 mg oral tablet, extended release  -- 1 tab(s) by mouth once a day (in the evening)    meloxicam 15 mg oral tablet  -- 1 tab(s) by mouth once a day    Plavix 75 mg oral tablet

## 2017-11-29 NOTE — CONSULT NOTE ADULT - ASSESSMENT
81yo F Frail elderly Female  S/P Fall->L Basal Ganglia Bleed>with ICH   Residual R Hemiaparesis  Dysphagia-Honey thickened-Puree consistency.

## 2017-11-29 NOTE — CONSULT NOTE ADULT - PROBLEM SELECTOR RECOMMENDATION 9
Agree to ICU care.  Hold antiplatelets/anticoag for now until cleared by NO.  Get MRI Brain to r/o mass.  Get MRA head without lou r/o aneurysm.  Appreciate and follow per NO.  Continue with ICU care and follow per protocol for BP in setting of ICH.  DVT prophylaxis.  Eventual PT/OT/Speech/Swallow/Rehab placement.  D/w Dr Underwood in detail and await for above noted diagnostic testing.  Will follow.
Manage Hypertension  Medicallly manage other Co-morbidities  After testing-PT maybe for FAM

## 2017-11-29 NOTE — DISCHARGE NOTE ADULT - PATIENT PORTAL LINK FT
“You can access the FollowHealth Patient Portal, offered by [facility], by registering with the following website: [email or web address]”

## 2017-11-29 NOTE — CONSULT NOTE ADULT - SUBJECTIVE AND OBJECTIVE BOX
HPI: This is an 81yo weak and frail F PMH HTN, DM found on floor (fell) at home. CT Brain ICH with R Hemipareses, dysphagia-diet consistency puree with honey thickened fluids  She is A & O x2. Does not know year of her birth, Present Day/year/President.  used for consult.  She is OOB/CH tolerating pureed diet.  Denies N/V/D/ pain spasm headache or constipation.    81 y/o female found down earlier today by son found with left basal ganglia hemorrhage with intraventricular extension. Per son patient stated she fell earlier today. At baseline patient lives alone and is independent with her activities of daily living. Last seen baseline ~ 10 PM last night. Patient is able to verbalize her name but is now expressively aphasic and may be mildly receptively aphasic, not making sense in Malay when asked place/time and intermittently following some simple commands. Takes ASA/Plavix. (25 Nov 2017 16:32)      PERTINENT PMH REVIEWED: Yes     PAST MEDICAL & SURGICAL HISTORY:  GERD (gastroesophageal reflux disease)  Diabetes mellitus  High cholesterol  Hypertension      SOCIAL HISTORY:  EtOH    No                                    Drugs    No                                    nonsmoker                                    Admitted from: home  Son: Chandra Membreno [phone]  FAMILY HISTORY:  No pertinent family history in first degree relatives    Allergies    No Known Allergies  Baseline ADLs (prior to admission):  Independent/      Present Symptoms:   Dyspnea: 0 1 2 3   Nausea/Vomiting: Yes No  Anxiety:  Yes No  Depression: Yes No  Fatigue: Yes No  Loss of appetite: Yes No    Pain: denies            Character-            Duration-            Effect-            Factors-            Frequency-            Location-            Severity-    Review of Systems: Reviewed                     All others negative    MEDICATIONS  (STANDING):  amLODIPine   Tablet 10 milliGRAM(s) Oral daily  atorvastatin 80 milliGRAM(s) Oral at bedtime  cloNIDine Patch 0.2 mG/24Hr(s) 1 patch Topical every 7 days  insulin glargine Injectable (LANTUS) 14 Unit(s) SubCutaneous at bedtime  insulin lispro (HumaLOG) corrective regimen sliding scale   SubCutaneous Before meals and at bedtime  insulin lispro Injectable (HumaLOG) 4 Unit(s) SubCutaneous three times a day before meals  losartan 50 milliGRAM(s) Oral daily  pantoprazole  Injectable 40 milliGRAM(s) IV Push daily    MEDICATIONS  (PRN):  acetaminophen   Tablet. 650 milliGRAM(s) Oral every 6 hours PRN Mild Pain (1 - 3)  acetaminophen  Suppository 650 milliGRAM(s) Rectal every 6 hours PRN For Temp greater than 38 C (100.4 F)  hydrALAZINE Injectable 10 milliGRAM(s) IV Push every 4 hours PRN SBP > 160      PHYSICAL EXAM:    Vital Signs Last 24 Hrs  T(C): 37.2 (29 Nov 2017 08:26), Max: 37.7 (29 Nov 2017 00:05)  T(F): 99 (29 Nov 2017 08:26), Max: 99.8 (29 Nov 2017 00:05)  HR: 75 (29 Nov 2017 08:26) (75 - 90)  BP: 154/72 (29 Nov 2017 08:26) (148/70 - 206/98)  BP(mean): --  RR: 18 (29 Nov 2017 08:26) (18 - 18)  SpO2: 98% (29 Nov 2017 08:26) (97% - 98%)    General: alert  oriented x _2___                 thin and frail     HEENT: normal      Lungs: comfortable    CV: normal      GI: normal     Neuro: R eye droop (Blind) R arm and hand weak                                           RLE weaker then Left           : normal      MSK: R sided  weakness        PT to evaluate-standing pivoting      Skin: normal    no rash    LABS:                        10.7   7.6   )-----------( 282      ( 28 Nov 2017 08:15 )             32.6     11-28    141  |  106  |  16.0  ----------------------------<  174<H>  3.7   |  23.0  |  0.55    Ca    8.8      28 Nov 2017 08:15  Phos  3.0     11-28  Mg     1.8     11-28      I&O's Summary    28 Nov 2017 07:01  -  29 Nov 2017 07:00  --------------------------------------------------------  IN: 720 mL / OUT: 0 mL / NET: 720 mL    RADIOLOGY & ADDITIONAL STUDIES:    ADVANCE DIRECTIVES:   DNR NO  Completed on:                     MOLST   NO   Completed on:  Living Will   NO   Completed on:

## 2017-11-29 NOTE — DISCHARGE NOTE ADULT - CARE PLAN
Principal Discharge DX:	Intracranial hemorrhage  Goal:	stable for discharge  Instructions for follow-up, activity and diet:	It is important to see your primary physician as well as the physicians noted below within the next week to perform a comprehensive medical review.  Call their offices for an appointment as soon as you leave the hospital.  If you do not have a primary physician, contact the [facility] at [place] [phone] located on [address] [place] [address].  Your medical issues appear to be stable at this time, but if your symptoms recur or worsen, contact your physicians and/or return to the hospital if necessary.  If you encounter any issues or questions with your medication, call your physicians before stopping the medication.  Do not drive.  Limit your diet to 2 grams of sodium daily.  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

## 2017-11-29 NOTE — CONSULT NOTE ADULT - ATTENDING COMMENTS
COUNSELING:   utilized for consult    Face to face meeting to discuss Advanced Care Planning - Time Spent ______ Minutes.  See goals of care note.    More than 50% time spent in counseling and coordinating care. __40____ Minutes.     Thank you for the opportunity to assist with the care of this patient.   [facility] [phone].

## 2017-11-29 NOTE — DISCHARGE NOTE ADULT - MEDICATION SUMMARY - MEDICATIONS TO TAKE
I will START or STAY ON the medications listed below when I get home from the hospital:    losartan 50 mg oral tablet  -- 1 tab(s) by mouth once a day  -- Indication: For Hypertension    cloNIDine 0.2 mg oral tablet  -- 1 tab(s) by mouth 2 times a day  -- Indication: For Hypertension    insulin lispro 100 units/mL subcutaneous solution  -- 4 unit(s) subcutaneous 3 times a day (before meals)  -- Indication: For Diabetes mellitus    insulin glargine  -- 14 unit(s) subcutaneous once a day (at bedtime)  -- Indication: For Diabetes mellitus    atorvastatin 80 mg oral tablet  -- 1 tab(s) by mouth once a day (at bedtime)  -- Indication: For Hyperlipidemia    amLODIPine 10 mg oral tablet  -- 1 tab(s) by mouth once a day  -- Indication: For Hypertension    pantoprazole 40 mg oral granule, enteric coated  -- 1 each by mouth once a day  -- Indication: For Gastritis I will START or STAY ON the medications listed below when I get home from the hospital:    losartan 50 mg oral tablet  -- 1 tab(s) by mouth once a day  -- Indication: For Hypertension    cloNIDine 0.2 mg oral tablet  -- 1 tab(s) by mouth 2 times a day  -- Indication: For Hypertension    insulin lispro 100 units/mL subcutaneous solution  -- 4 unit(s) subcutaneous 3 times a day (before meals)  -- Indication: For Diabetes mellitus    insulin glargine  -- 14 unit(s) subcutaneous once a day (at bedtime)  -- Indication: For Diabetes mellitus    atorvastatin 80 mg oral tablet  -- 1 tab(s) by mouth once a day (at bedtime)  -- Indication: For Hyperlipidemia    metoprolol tartrate 25 mg oral tablet  -- 1 tab(s) by mouth 2 times a day  -- Indication: For Hypertension    amLODIPine 10 mg oral tablet  -- 1 tab(s) by mouth once a day  -- Indication: For Hypertension    pantoprazole 40 mg oral granule, enteric coated  -- 1 each by mouth once a day  -- Indication: For Gastritis I will START or STAY ON the medications listed below when I get home from the hospital:    losartan 50 mg oral tablet  -- 1 tab(s) by mouth once a day  -- Indication: For Hypertension    cloNIDine 0.2 mg oral tablet  -- 1 tab(s) by mouth 2 times a day  -- Indication: For Hypertension    insulin glargine  -- 20 unit(s) subcutaneous once a day (at bedtime)  -- Indication: For Diabetes mellitus    insulin lispro 100 units/mL subcutaneous solution  -- 4 unit(s) subcutaneous 3 times a day (before meals)  -- Indication: For Diabetes mellitus    atorvastatin 80 mg oral tablet  -- 1 tab(s) by mouth once a day (at bedtime)  -- Indication: For Hyperlipidemia    metoprolol tartrate 25 mg oral tablet  -- 1 tab(s) by mouth 2 times a day  -- Indication: For Hypertension    amLODIPine 10 mg oral tablet  -- 1 tab(s) by mouth once a day  -- Indication: For Hypertension    pantoprazole 40 mg oral granule, enteric coated  -- 1 each by mouth once a day  -- Indication: For Gastritis

## 2017-11-29 NOTE — PROGRESS NOTE ADULT - SUBJECTIVE AND OBJECTIVE BOX
CC: Hemorrhagic stroke      INTERVAL HPI/OVERNIGHT EVENTS: No acute events overnight. Taking po well. Denies chest pain, SOB, dizziness, lightheadedness, nausea, vomiting, fever, chills.     Vital Signs Last 24 Hrs  T(C): 37.2 (29 Nov 2017 08:26), Max: 37.7 (29 Nov 2017 00:05)  T(F): 99 (29 Nov 2017 08:26), Max: 99.8 (29 Nov 2017 00:05)  HR: 75 (29 Nov 2017 08:26) (75 - 90)  BP: 154/72 (29 Nov 2017 08:26) (148/70 - 206/98)  BP(mean): --  RR: 18 (29 Nov 2017 08:26) (18 - 18)  SpO2: 98% (29 Nov 2017 08:26) (97% - 98%)  I&O's Detail    28 Nov 2017 07:01  -  29 Nov 2017 07:00  --------------------------------------------------------  IN:    Oral Fluid: 720 mL  Total IN: 720 mL    OUT:  Total OUT: 0 mL    Total NET: 720 mL      PHYSICAL EXAM:    General: Well developed; well nourished; in no acute distress  Respiratory: No wheezes, rales or rhonchi  Cardiovascular: Regular rate and rhythm. S1 and S2 Normal; No murmurs, gallops or rubs  Gastrointestinal: Soft non-tender non-distended; Normal bowel sounds; No hepatosplenomegaly  Extremities: Normal range of motion, No clubbing, cyanosis or edema  Vascular: Peripheral pulses palpable 2+ bilaterally  Neurological: Alert and oriented x2 , right hemiparesis, right facial droop  Skin: Warm and dry. No acute rash  Psychiatric: Cooperative and appropriate                              10.7   7.6   )-----------( 282      ( 28 Nov 2017 08:15 )             32.6     28 Nov 2017 08:15    141    |  106    |  16.0   ----------------------------<  174    3.7     |  23.0   |  0.55     Ca    8.8        28 Nov 2017 08:15  Phos  3.0       28 Nov 2017 08:15  Mg     1.8       28 Nov 2017 08:15        CAPILLARY BLOOD GLUCOSE      POCT Blood Glucose.: 218 mg/dL (29 Nov 2017 08:38)  POCT Blood Glucose.: 176 mg/dL (28 Nov 2017 22:18)  POCT Blood Glucose.: 340 mg/dL (28 Nov 2017 17:29)  POCT Blood Glucose.: 294 mg/dL (28 Nov 2017 12:59)        Hemoglobin A1C, Whole Blood: 10.3 % (11-26-17 @ 04:30)    MEDICATIONS  (STANDING):  amLODIPine   Tablet 10 milliGRAM(s) Oral daily  atorvastatin 80 milliGRAM(s) Oral at bedtime  cloNIDine Patch 0.2 mG/24Hr(s) 1 patch Topical every 7 days  insulin glargine Injectable (LANTUS) 14 Unit(s) SubCutaneous at bedtime  insulin lispro (HumaLOG) corrective regimen sliding scale   SubCutaneous Before meals and at bedtime  insulin lispro Injectable (HumaLOG) 4 Unit(s) SubCutaneous three times a day before meals  losartan 50 milliGRAM(s) Oral daily  pantoprazole  Injectable 40 milliGRAM(s) IV Push daily    MEDICATIONS  (PRN):  acetaminophen  Suppository 650 milliGRAM(s) Rectal every 6 hours PRN For Temp greater than 38 C (100.4 F)  hydrALAZINE Injectable 10 milliGRAM(s) IV Push every 4 hours PRN SBP > 160      RADIOLOGY & ADDITIONAL TESTS:

## 2017-11-29 NOTE — DISCHARGE NOTE ADULT - CARE PROVIDERS DIRECT ADDRESSES
,DirectAddress_Unknown,[email or web address]

## 2017-11-29 NOTE — DISCHARGE NOTE ADULT - CARE PROVIDER_API CALL
Lyle Gaviria), Neurology  [address] [place] [address]  Phone: [phone]  Fax: [phone]    Long Garcia), Neurosurgery  [address] [place] [address]  Phone: [phone]  Fax: [phone]

## 2017-11-29 NOTE — CONSULT NOTE ADULT - PROBLEM SELECTOR PROBLEM 2
Hypertension, unspecified type
Hemiparesis, unspecified hemiparesis etiology, unspecified laterality

## 2017-11-29 NOTE — DISCHARGE NOTE ADULT - SECONDARY DIAGNOSIS.
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

## 2017-11-29 NOTE — CONSULT NOTE ADULT - CONSULT REASON
CNS bleed
DM/HTN/Debility ICH
Left basal ganglia hemorrhage with intraventricular extension
Rehab evaluation

## 2017-11-29 NOTE — PROGRESS NOTE ADULT - ASSESSMENT
81 yo female, PMHx GERD, DM, HTN, HLD, found unresponsive admitted with hypertensive emergency and L basal ganglia hemorrhage secondary to hypertension, with uncontrolled hyperglycemia.    > L basal ganglia hemorrhage - Neurology, Neurosurgery input appreciated.  Continue to observe off antiplatelets.  MRA grossly unremarkable.    > HTN - Increase Norvasc for better BP control.    > GERD - continue PPI.  > Diabetes - monitor fingersticks.  Insulin coverage for hyperglycemia.  Increase Lantus  > HLD - continue Statin  > Urinary Retention - PVR was 500ml.  Straight cath and observe for retention.   > DVT Prophylaxis - Lower extremity intermittent compression devices.  Will discuss with Acute rehab if patient is candidate, patient has 8 hours assistance at home as per [facility].        Problem/Plan - 1:  ·  Problem: Hemorrhagic stroke.      Problem/Plan - 2:  ·  Problem: Hypertensive emergency.      Problem/Plan - 3:  ·  Problem: Hemiparesis.      Problem/Plan - 4:  ·  Problem: Diabetes mellitus.      Problem/Plan - 5:  ·  Problem: Hypertension. 81 yo female, PMHx GERD, DM, HTN, HLD, found unresponsive admitted with hypertensive emergency and L basal ganglia hemorrhage secondary to hypertension, with uncontrolled hyperglycemia.    > L basal ganglia hemorrhage - Neurology, Neurosurgery input appreciated.  Continue to observe off antiplatelets.  MRA grossly unremarkable.      > HTN -  Norvasc increased yesterday for better BP control.      > GERD - continue PPI.    > Diabetes - monitor fingersticks.  Insulin coverage for hyperglycemia.  Lantus increased yesterday, continue premeal, continue to monitor    > HLD - continue Statin    > Urinary Retention - PVR was 500ml yesterday, requiring straight cath. Will repeat today if still retaining will order bladder scan q 6 hours with straight cath orders as needed     > DVT Prophylaxis - Lower extremity intermittent compression devices.    Will discuss with Acute rehab if patient is candidate, patient has 8 hours assistance at home as per [facility].     Dispo: FAM vs Acute rehab

## 2017-11-29 NOTE — PROGRESS NOTE ADULT - SUBJECTIVE AND OBJECTIVE BOX
Patient seen with   INTERVAL SUBJECTIVE & REVIEW OF SYMPTOMS:  States that she is feeling well. Denies HA  Denies CP/Dyspnea/abdominal pain/nausea. Denies numbess          VITAL SIGNS  Vital Signs Last 24 Hrs  T(C): 37.2 (29 Nov 2017 08:26), Max: 37.7 (29 Nov 2017 00:05)  T(F): 99 (29 Nov 2017 08:26), Max: 99.8 (29 Nov 2017 00:05)  HR: 75 (29 Nov 2017 08:26) (75 - 90)  BP: 154/72 (29 Nov 2017 08:26) (148/70 - 206/98)  BP(mean): --  RR: 18 (29 Nov 2017 08:26) (18 - 18)  SpO2: 98% (29 Nov 2017 08:26) (97% - 98%)    PHYSICAL EXAM  General: Seated in chair  Cardio: S1S2+  Resp: clear  Abdomen: soft  Neuro: aaox2 with cues for place, Motor 5/5 left side, Right 2/5    Functional Exam: max assist x2 for OOB transfers    RECENT LABS:                        10.7   7.6   )-----------( 282      ( 28 Nov 2017 08:15 )             32.6     11-28    141  |  106  |  16.0  ----------------------------<  174<H>  3.7   |  23.0  |  0.55    Ca    8.8      28 Nov 2017 08:15  Phos  3.0     11-28  Mg     1.8     11-28    RADIOLOGY/OTHER RESULTS:    A/P:    82 year old female with left basal ganglia bleed- hypertensive  She has right hemiparesis, dysphagia  Per CCC note, patient had HHA at home. Patient unable to tell me that she has aides.  At this time recommend a slower pace of subacute rehab program.  d/w Nicolasa Campbell, hospitalist NP.  Thank you

## 2017-11-29 NOTE — DISCHARGE NOTE ADULT - HOSPITAL COURSE
Patient: ZION LEACH [id number]                 Internal Medicine Hospitalist Discharge Note - Dr. Tereso Grady    83 yo female, PMHx GERD, DM, HTN, HLD, found unresponsive by son. Per son, patient stated she fell earlier in the day. Last seen normal approx 10 PM the night prior. Found to have left basal ganglia hemorrhage with intraventricular extension.  At baseline patient lives alone and is independent with her ADLs. On ASA/Plavix. Seen by neurology and neurosurgery, admitted initially to MICU.  Speech and mental status seemed to improve.  R sided weakness improving.  83 yo female, PMHx GERD, DM, HTN, HLD, found unresponsive admitted with hypertensive emergency and L basal ganglia hemorrhage secondary to hypertension, with uncontrolled hyperglycemia.    > L basal ganglia hemorrhage - Neurology, Neurosurgery input appreciated.  Continue to observe off antiplatelets.  MRA grossly unremarkable.    > HTN - Increased Norvasc for better BP control.    > GERD - continue PPI.  > Diabetes - monitor fingersticks.  Insulin coverage for hyperglycemia.  Increase Lantus  > HLD - continue Statin  > Urinary Retention - Cr has remained stable.  Observe urine output, outpatient urology followup.   > DVT Prophylaxis - Lower extremity intermittent compression devices.          Disposition: stable for discharge.  Outpatient followup as above.  Patient was advised to return if they experience any recurrence or worsening of symptoms.  Total time spent on discharge was 35 minutes.  -Tereso Grady D.O., Hospitalist, [facility] Patient: ZION LEACH [id number]                 Internal Medicine Hospitalist Discharge Note - Dr. Tereso Grady    83 yo female, PMHx GERD, DM, HTN, HLD, found unresponsive by son. Per son, patient stated she fell earlier in the day. Last seen normal approx 10 PM the night prior. Found to have left basal ganglia hemorrhage with intraventricular extension.  At baseline patient lives alone and is independent with her ADLs. On ASA/Plavix. Seen by neurology and neurosurgery, admitted initially to MICU.  Speech and mental status seemed to improve.  R sided weakness improving.  83 yo female, PMHx GERD, DM, HTN, HLD, found unresponsive admitted with hypertensive emergency and L basal ganglia hemorrhage secondary to hypertension, with uncontrolled hyperglycemia.  FS, BP controlled.  Metoprolol added for better BP control.      > L basal ganglia hemorrhage - Neurology, Neurosurgery input appreciated.  Continue to observe off antiplatelets.  MRA grossly unremarkable.    > HTN - Continue current BP meds.  Metoprolol added on 11/30.   > GERD - continue PPI.  > Diabetes - monitor fingersticks.  Insulin coverage for hyperglycemia.  Increase Lantus  > HLD - continue Statin  > Urinary Retention - Cr has remained stable.  Observe urine output, outpatient urology followup.   > DVT Prophylaxis - Lower extremity intermittent compression devices.          Disposition: stable for discharge.  Outpatient followup as above.  Patient was advised to return if they experience any recurrence or worsening of symptoms.  Total time spent on discharge was 35 minutes.  -Tereso Grady D.O., Hospitalist, [facility]

## 2017-11-30 VITALS
RESPIRATION RATE: 20 BRPM | TEMPERATURE: 99 F | OXYGEN SATURATION: 95 % | DIASTOLIC BLOOD PRESSURE: 82 MMHG | SYSTOLIC BLOOD PRESSURE: 154 MMHG | HEART RATE: 95 BPM

## 2017-11-30 LAB
GLUCOSE BLDC GLUCOMTR-MCNC: 248 MG/DL — HIGH (ref 70–99)
GLUCOSE BLDC GLUCOMTR-MCNC: 458 MG/DL — CRITICAL HIGH (ref 70–99)
GLUCOSE BLDC GLUCOMTR-MCNC: 498 MG/DL — CRITICAL HIGH (ref 70–99)

## 2017-11-30 PROCEDURE — 99233 SBSQ HOSP IP/OBS HIGH 50: CPT

## 2017-11-30 RX ORDER — METOPROLOL TARTRATE 50 MG
25 TABLET ORAL
Qty: 0 | Refills: 0 | Status: DISCONTINUED | OUTPATIENT
Start: 2017-11-30 | End: 2017-11-30

## 2017-11-30 RX ORDER — INSULIN GLARGINE 100 [IU]/ML
20 INJECTION, SOLUTION SUBCUTANEOUS
Qty: 0 | Refills: 0 | COMMUNITY
Start: 2017-11-30

## 2017-11-30 RX ORDER — INSULIN GLARGINE 100 [IU]/ML
30 INJECTION, SOLUTION SUBCUTANEOUS
Qty: 0 | Refills: 0 | COMMUNITY
Start: 2017-11-30

## 2017-11-30 RX ORDER — HYDRALAZINE HCL 50 MG
10 TABLET ORAL ONCE
Qty: 0 | Refills: 0 | Status: DISCONTINUED | OUTPATIENT
Start: 2017-11-30 | End: 2017-11-30

## 2017-11-30 RX ORDER — INSULIN GLARGINE 100 [IU]/ML
20 INJECTION, SOLUTION SUBCUTANEOUS AT BEDTIME
Qty: 0 | Refills: 0 | Status: DISCONTINUED | OUTPATIENT
Start: 2017-11-30 | End: 2017-11-30

## 2017-11-30 RX ORDER — METOPROLOL TARTRATE 50 MG
1 TABLET ORAL
Qty: 0 | Refills: 0 | COMMUNITY
Start: 2017-11-30

## 2017-11-30 RX ORDER — HYDRALAZINE HCL 50 MG
10 TABLET ORAL ONCE
Qty: 0 | Refills: 0 | Status: COMPLETED | OUTPATIENT
Start: 2017-11-30 | End: 2017-11-30

## 2017-11-30 RX ORDER — LABETALOL HCL 100 MG
10 TABLET ORAL ONCE
Qty: 0 | Refills: 0 | Status: DISCONTINUED | OUTPATIENT
Start: 2017-11-30 | End: 2017-11-30

## 2017-11-30 RX ADMIN — Medication 12: at 13:09

## 2017-11-30 RX ADMIN — Medication 10 MILLIGRAM(S): at 02:22

## 2017-11-30 RX ADMIN — Medication 10 MILLIGRAM(S): at 00:30

## 2017-11-30 RX ADMIN — PANTOPRAZOLE SODIUM 40 MILLIGRAM(S): 20 TABLET, DELAYED RELEASE ORAL at 06:05

## 2017-11-30 RX ADMIN — Medication 4 UNIT(S): at 09:30

## 2017-11-30 RX ADMIN — AMLODIPINE BESYLATE 10 MILLIGRAM(S): 2.5 TABLET ORAL at 06:05

## 2017-11-30 RX ADMIN — Medication 4 UNIT(S): at 13:08

## 2017-11-30 RX ADMIN — LOSARTAN POTASSIUM 50 MILLIGRAM(S): 100 TABLET, FILM COATED ORAL at 06:05

## 2017-11-30 RX ADMIN — Medication 4: at 09:30

## 2017-11-30 NOTE — PROGRESS NOTE ADULT - PROBLEM SELECTOR PROBLEM 2
Hypertensive emergency
Essential hypertension

## 2017-11-30 NOTE — PROGRESS NOTE ADULT - ASSESSMENT
81 yo female, PMHx GERD, DM, HTN, HLD, found unresponsive admitted with hypertensive emergency and L basal ganglia hemorrhage secondary to hypertension, with uncontrolled hyperglycemia.    > L basal ganglia hemorrhage - Neurology, Neurosurgery input appreciated.  Continue to observe off antiplatelets.  MRA grossly unremarkable.    > HTN - Resume BBlocker for BP control.   > GERD - continue PPI.  > Diabetes - monitor fingersticks.  Insulin coverage for hyperglycemia.  Increased Lantus  > HLD - continue Statin  > Urinary Retention - PVR was 500ml.  Straight cath and observe for retention.   > DVT Prophylaxis - Lower extremity intermittent compression devices.  Likely Acute / FAM placement in am.

## 2017-11-30 NOTE — PROGRESS NOTE ADULT - SUBJECTIVE AND OBJECTIVE BOX
Patient: ZION LEACH [id number] 82y Female                 Internal Medicine Hospitalist Progress Note - Dr. Tereso Grady    Chief Complaint: Patient is a 82y old  Female who presents with a chief complaint of   HPI:  83 yo female, PMHx GERD, DM, HTN, HLD, found unresponsive by son. Per son, patient stated she fell earlier in the day. Last seen normal approx 10 PM the night prior. Found to have left basal ganglia hemorrhage with intraventricular extension.  At baseline patient lives alone and is independent with her ADLs. On ASA/Plavix. Seen by neurology and neurosurgery, admitted initially to MICU.  Speech and mental status seemed to improve.      Interim history: Denies specific complaints.  No pain.  No new weakness / numbness.  Fs noted to be elevated.     ____________________PHYSICAL EXAM:  Vitals reviewed as indicated below  GENERAL:  NAD Alert and Oriented x 2 to person, place.   HEENT: NCAT  CARDIOVASCULAR:  S1, S2  LUNGS: CTAB  ABDOMEN:  soft, (-) tenderness, (-) distension, (+) bowel sounds, (-) guarding, (-) rebound (-) rigidity  EXTREMITIES:  no cyanosis / clubbing / edema.   NEURO: RUE / RLE strength 0/5.  R handgrip 1/5.  LUE / LLE strength 5/5.  L facial droop.  Slurred speech  ____________________      VITALS:  Vital Signs Last 24 Hrs  T(C): 37.6 (30 Nov 2017 08:00), Max: 37.6 (30 Nov 2017 08:00)  T(F): 99.7 (30 Nov 2017 08:00), Max: 99.7 (30 Nov 2017 08:00)  HR: 87 (30 Nov 2017 08:00) (82 - 87)  BP: 170/86 (30 Nov 2017 08:00) (160/70 - 180/72)  BP(mean): --  RR: 18 (30 Nov 2017 08:00) (18 - 18)  SpO2: 98% (30 Nov 2017 08:00) (98% - 99%) Daily     Daily   CAPILLARY BLOOD GLUCOSE      POCT Blood Glucose.: 458 mg/dL (30 Nov 2017 12:39)  POCT Blood Glucose.: 498 mg/dL (30 Nov 2017 12:37)  POCT Blood Glucose.: 248 mg/dL (30 Nov 2017 08:21)  POCT Blood Glucose.: 121 mg/dL (29 Nov 2017 22:23)  POCT Blood Glucose.: 262 mg/dL (29 Nov 2017 17:38)    I&O's Summary    29 Nov 2017 07:01  -  30 Nov 2017 07:00  --------------------------------------------------------  IN: 360 mL / OUT: 0 mL / NET: 360 mL        LABS:                      MEDICATIONS:  acetaminophen   Tablet. 650 milliGRAM(s) Oral every 6 hours PRN  acetaminophen  Suppository 650 milliGRAM(s) Rectal every 6 hours PRN  amLODIPine   Tablet 10 milliGRAM(s) Oral daily  atorvastatin 80 milliGRAM(s) Oral at bedtime  cloNIDine Patch 0.2 mG/24Hr(s) 1 patch Topical every 7 days  hydrALAZINE Injectable 10 milliGRAM(s) IV Push every 4 hours PRN  insulin glargine Injectable (LANTUS) 20 Unit(s) SubCutaneous at bedtime  insulin lispro (HumaLOG) corrective regimen sliding scale   SubCutaneous Before meals and at bedtime  insulin lispro Injectable (HumaLOG) 4 Unit(s) SubCutaneous three times a day before meals  losartan 50 milliGRAM(s) Oral daily  metoprolol     tartrate 25 milliGRAM(s) Oral two times a day  pantoprazole    Tablet 40 milliGRAM(s) Oral before breakfast

## 2017-11-30 NOTE — PROGRESS NOTE ADULT - PROVIDER SPECIALTY LIST ADULT
Critical Care
Critical Care
Hospitalist
Hospitalist
Neurology
Neurosurgery
Neurosurgery
Rehab Medicine
Critical Care

## 2017-12-05 ENCOUNTER — EMERGENCY (EMERGENCY)
Facility: HOSPITAL | Age: 82
LOS: 1 days | Discharge: HOME | End: 2017-12-05
Attending: EMERGENCY MEDICINE | Admitting: EMERGENCY MEDICINE
Payer: MEDICARE

## 2017-12-05 VITALS
OXYGEN SATURATION: 98 % | WEIGHT: 115.08 LBS | TEMPERATURE: 99 F | HEIGHT: 62 IN | RESPIRATION RATE: 18 BRPM | DIASTOLIC BLOOD PRESSURE: 77 MMHG | SYSTOLIC BLOOD PRESSURE: 155 MMHG | HEART RATE: 55 BPM

## 2017-12-05 PROCEDURE — 99284 EMERGENCY DEPT VISIT MOD MDM: CPT

## 2017-12-05 PROCEDURE — 70450 CT HEAD/BRAIN W/O DYE: CPT | Mod: 26

## 2017-12-05 RX ORDER — SODIUM CHLORIDE 9 MG/ML
3 INJECTION INTRAMUSCULAR; INTRAVENOUS; SUBCUTANEOUS ONCE
Qty: 0 | Refills: 0 | Status: COMPLETED | OUTPATIENT
Start: 2017-12-05 | End: 2017-12-05

## 2017-12-05 NOTE — ED PROVIDER NOTE - PROGRESS NOTE DETAILS
CT results as noted.  Labs as noted.  Still awaiting UA results at this time and advised Rigo/supervisor at [facility] that as long as UA was okay pt would be dc'd back to [facility] CT results as noted.  Labs as noted.  Still awaiting UA results at this time and advised Rigo/supervisor at [facility] that as long as UA was okay pt would be dc'd back to NH.  Attempted to contact pt's next of kin/Chandra Membreno, but there was no answer at number and voice mail left with no return call ua unremarkable.  rectal temp was 100.5  no sign of pneumonia on xray.  no leukocytosis low grade temp may be due to ICH as well  will treat with antipyretics and follow up with pmd.

## 2017-12-05 NOTE — ED PROVIDER NOTE - CARE PLAN
Principal Discharge DX:	Confusion and disorientation  Secondary Diagnosis:	History of intracranial hemorrhage

## 2017-12-05 NOTE — ED PROVIDER NOTE - OBJECTIVE STATEMENT
83 y/o female with PMHx CVA presents to the ED via nursing home for medical evaluation. Per EMS, pt son believed pt appeared more lethargic than usual. Unable to obtain HPI secondary to hx of dementia.

## 2017-12-05 NOTE — ED ADULT TRIAGE NOTE - CHIEF COMPLAINT QUOTE
patient BIBA from [facility] states that she was sent because the son was concerned that the patient had increased lethargy, as per ems NH staff state that patient has been acting the same since they received her a few days ago. patient had a history of a CVA unknown when patient BIBA from [facility] states that she was sent because the son was concerned that the patient had increased lethargy, as per ems NH staff state that patient has been acting the same since they received her a few days ago. at baseline patient alert and oriented x2. patient had a history of a CVA unknown when, right sided defecits

## 2017-12-05 NOTE — ED PROVIDER NOTE - UNABLE TO OBTAIN
Unable to obtain HPI secondary to hx of dementia. Dementia Unable to obtain HPI secondary to pt only oriented to person and no amily available to give additional hx

## 2017-12-06 VITALS
OXYGEN SATURATION: 99 % | RESPIRATION RATE: 18 BRPM | TEMPERATURE: 98 F | HEART RATE: 58 BPM | DIASTOLIC BLOOD PRESSURE: 74 MMHG | SYSTOLIC BLOOD PRESSURE: 159 MMHG

## 2017-12-06 LAB
ALBUMIN SERPL ELPH-MCNC: 3 G/DL — LOW (ref 3.3–5.2)
ALP SERPL-CCNC: 65 U/L — SIGNIFICANT CHANGE UP (ref 40–120)
ALT FLD-CCNC: 33 U/L — HIGH
ANION GAP SERPL CALC-SCNC: 11 MMOL/L — SIGNIFICANT CHANGE UP (ref 5–17)
APPEARANCE UR: CLEAR — SIGNIFICANT CHANGE UP
AST SERPL-CCNC: 21 U/L — SIGNIFICANT CHANGE UP
BACTERIA # UR AUTO: ABNORMAL
BILIRUB SERPL-MCNC: 0.3 MG/DL — LOW (ref 0.4–2)
BILIRUB UR-MCNC: NEGATIVE — SIGNIFICANT CHANGE UP
BUN SERPL-MCNC: 15 MG/DL — SIGNIFICANT CHANGE UP (ref 8–20)
CALCIUM SERPL-MCNC: 8.6 MG/DL — SIGNIFICANT CHANGE UP (ref 8.6–10.2)
CHLORIDE SERPL-SCNC: 102 MMOL/L — SIGNIFICANT CHANGE UP (ref 98–107)
CO2 SERPL-SCNC: 27 MMOL/L — SIGNIFICANT CHANGE UP (ref 22–29)
COLOR SPEC: YELLOW — SIGNIFICANT CHANGE UP
CREAT SERPL-MCNC: 0.58 MG/DL — SIGNIFICANT CHANGE UP (ref 0.5–1.3)
DIFF PNL FLD: NEGATIVE — SIGNIFICANT CHANGE UP
EOSINOPHIL # BLD AUTO: 0 K/UL — SIGNIFICANT CHANGE UP (ref 0–0.5)
EOSINOPHIL NFR BLD AUTO: 0.5 % — SIGNIFICANT CHANGE UP (ref 0–6)
EPI CELLS # UR: SIGNIFICANT CHANGE UP
GLUCOSE SERPL-MCNC: 230 MG/DL — HIGH (ref 70–115)
GLUCOSE UR QL: 100 MG/DL
HCT VFR BLD CALC: 32 % — LOW (ref 37–47)
HGB BLD-MCNC: 10.5 G/DL — LOW (ref 12–16)
KETONES UR-MCNC: NEGATIVE — SIGNIFICANT CHANGE UP
LEUKOCYTE ESTERASE UR-ACNC: ABNORMAL
LYMPHOCYTES # BLD AUTO: 1.4 K/UL — SIGNIFICANT CHANGE UP (ref 1–4.8)
LYMPHOCYTES # BLD AUTO: 14.1 % — LOW (ref 20–55)
MCHC RBC-ENTMCNC: 25.6 PG — LOW (ref 27–31)
MCHC RBC-ENTMCNC: 32.8 G/DL — SIGNIFICANT CHANGE UP (ref 32–36)
MCV RBC AUTO: 78 FL — LOW (ref 81–99)
MONOCYTES # BLD AUTO: 1 K/UL — HIGH (ref 0–0.8)
MONOCYTES NFR BLD AUTO: 10.1 % — HIGH (ref 3–10)
NEUTROPHILS # BLD AUTO: 7.6 K/UL — SIGNIFICANT CHANGE UP (ref 1.8–8)
NEUTROPHILS NFR BLD AUTO: 75.1 % — HIGH (ref 37–73)
NITRITE UR-MCNC: NEGATIVE — SIGNIFICANT CHANGE UP
PH UR: 7 — SIGNIFICANT CHANGE UP (ref 5–8)
PLATELET # BLD AUTO: 224 K/UL — SIGNIFICANT CHANGE UP (ref 150–400)
POTASSIUM SERPL-MCNC: 3.7 MMOL/L — SIGNIFICANT CHANGE UP (ref 3.5–5.3)
POTASSIUM SERPL-SCNC: 3.7 MMOL/L — SIGNIFICANT CHANGE UP (ref 3.5–5.3)
PROT SERPL-MCNC: 6.1 G/DL — LOW (ref 6.6–8.7)
PROT UR-MCNC: 30 MG/DL
RBC # BLD: 4.1 M/UL — LOW (ref 4.4–5.2)
RBC # FLD: 19 % — HIGH (ref 11–15.6)
RBC CASTS # UR COMP ASSIST: SIGNIFICANT CHANGE UP /HPF (ref 0–4)
SODIUM SERPL-SCNC: 140 MMOL/L — SIGNIFICANT CHANGE UP (ref 135–145)
SP GR SPEC: 1.01 — SIGNIFICANT CHANGE UP (ref 1.01–1.02)
UROBILINOGEN FLD QL: NEGATIVE MG/DL — SIGNIFICANT CHANGE UP
WBC # BLD: 10.1 K/UL — SIGNIFICANT CHANGE UP (ref 4.8–10.8)
WBC # FLD AUTO: 10.1 K/UL — SIGNIFICANT CHANGE UP (ref 4.8–10.8)
WBC UR QL: SIGNIFICANT CHANGE UP

## 2017-12-06 PROCEDURE — 81001 URINALYSIS AUTO W/SCOPE: CPT

## 2017-12-06 PROCEDURE — 71010: CPT | Mod: 26

## 2017-12-06 PROCEDURE — 36415 COLL VENOUS BLD VENIPUNCTURE: CPT

## 2017-12-06 PROCEDURE — 70450 CT HEAD/BRAIN W/O DYE: CPT

## 2017-12-06 PROCEDURE — T1013: CPT

## 2017-12-06 PROCEDURE — 85027 COMPLETE CBC AUTOMATED: CPT

## 2017-12-06 PROCEDURE — 82962 GLUCOSE BLOOD TEST: CPT

## 2017-12-06 PROCEDURE — 71045 X-RAY EXAM CHEST 1 VIEW: CPT

## 2017-12-06 PROCEDURE — 99284 EMERGENCY DEPT VISIT MOD MDM: CPT | Mod: 25

## 2017-12-06 PROCEDURE — 80053 COMPREHEN METABOLIC PANEL: CPT

## 2017-12-06 RX ORDER — ACETAMINOPHEN 500 MG
325 TABLET ORAL EVERY 4 HOURS
Qty: 0 | Refills: 0 | Status: DISCONTINUED | OUTPATIENT
Start: 2017-12-06 | End: 2017-12-06

## 2017-12-06 RX ORDER — ACETAMINOPHEN 500 MG
500 TABLET ORAL ONCE
Qty: 0 | Refills: 0 | Status: COMPLETED | OUTPATIENT
Start: 2017-12-06 | End: 2017-12-06

## 2017-12-06 RX ORDER — IBUPROFEN 200 MG
400 TABLET ORAL ONCE
Qty: 0 | Refills: 0 | Status: DISCONTINUED | OUTPATIENT
Start: 2017-12-06 | End: 2017-12-06

## 2017-12-06 RX ADMIN — SODIUM CHLORIDE 3 MILLILITER(S): 9 INJECTION INTRAMUSCULAR; INTRAVENOUS; SUBCUTANEOUS at 01:53

## 2017-12-06 RX ADMIN — Medication 500 MILLIGRAM(S): at 05:47

## 2017-12-06 NOTE — ED ADULT NURSE NOTE - OBJECTIVE STATEMENT
Pt received resting on stretcher with son at bedside in [facility]. Pt A&O to self brought in from NH upon sons request after son found her to be lethargic.  Pt. alert and cooperative upon assessment; no lethargy noted.  Pt. s/p CVA 2 weeks ago; right sided facial droop noted along with right sided upper and lower extremity weakness. PERRLA. Lungs CTA, RR even unlabored. Ab soft non tender, + bowel sounds x 4quads. Denies Nausea, Vomiting, Diarrhea. Skin warm, dry, color appropriate for age and race.

## 2017-12-06 NOTE — ED ADULT NURSE NOTE - CHIEF COMPLAINT QUOTE
patient BIBA from [facility] states that she was sent because the son was concerned that the patient had increased lethargy, as per ems NH staff state that patient has been acting the same since they received her a few days ago. at baseline patient alert and oriented x2. patient had a history of a CVA unknown when, right sided defecits

## 2017-12-19 PROCEDURE — 80048 BASIC METABOLIC PNL TOTAL CA: CPT

## 2017-12-19 PROCEDURE — 36415 COLL VENOUS BLD VENIPUNCTURE: CPT

## 2017-12-19 PROCEDURE — 97110 THERAPEUTIC EXERCISES: CPT

## 2017-12-19 PROCEDURE — 70450 CT HEAD/BRAIN W/O DYE: CPT

## 2017-12-19 PROCEDURE — 82550 ASSAY OF CK (CPK): CPT

## 2017-12-19 PROCEDURE — 86900 BLOOD TYPING SEROLOGIC ABO: CPT

## 2017-12-19 PROCEDURE — 86850 RBC ANTIBODY SCREEN: CPT

## 2017-12-19 PROCEDURE — 71045 X-RAY EXAM CHEST 1 VIEW: CPT

## 2017-12-19 PROCEDURE — 80061 LIPID PANEL: CPT

## 2017-12-19 PROCEDURE — 70544 MR ANGIOGRAPHY HEAD W/O DYE: CPT

## 2017-12-19 PROCEDURE — 85610 PROTHROMBIN TIME: CPT

## 2017-12-19 PROCEDURE — 84100 ASSAY OF PHOSPHORUS: CPT

## 2017-12-19 PROCEDURE — 85027 COMPLETE CBC AUTOMATED: CPT

## 2017-12-19 PROCEDURE — 96374 THER/PROPH/DIAG INJ IV PUSH: CPT

## 2017-12-19 PROCEDURE — 93005 ELECTROCARDIOGRAM TRACING: CPT

## 2017-12-19 PROCEDURE — 85730 THROMBOPLASTIN TIME PARTIAL: CPT

## 2017-12-19 PROCEDURE — 84484 ASSAY OF TROPONIN QUANT: CPT

## 2017-12-19 PROCEDURE — 93306 TTE W/DOPPLER COMPLETE: CPT

## 2017-12-19 PROCEDURE — 83735 ASSAY OF MAGNESIUM: CPT

## 2017-12-19 PROCEDURE — 83036 HEMOGLOBIN GLYCOSYLATED A1C: CPT

## 2017-12-19 PROCEDURE — T1013: CPT

## 2017-12-19 PROCEDURE — 99285 EMERGENCY DEPT VISIT HI MDM: CPT | Mod: 25

## 2017-12-19 PROCEDURE — 72125 CT NECK SPINE W/O DYE: CPT

## 2017-12-19 PROCEDURE — 92610 EVALUATE SWALLOWING FUNCTION: CPT

## 2017-12-19 PROCEDURE — 86901 BLOOD TYPING SEROLOGIC RH(D): CPT

## 2017-12-19 PROCEDURE — 92526 ORAL FUNCTION THERAPY: CPT

## 2017-12-19 PROCEDURE — 97530 THERAPEUTIC ACTIVITIES: CPT

## 2017-12-19 PROCEDURE — 82962 GLUCOSE BLOOD TEST: CPT

## 2018-04-05 NOTE — ED ADULT TRIAGE NOTE - BP NONINVASIVE SYSTOLIC (MM HG)
Attempted to call pt re: lunch options while at work. L/M for pt to call back to discuss.     Shilpa Gonzalez RD, LD  Clinical Dietitian    [phone]

## 2018-07-27 ENCOUNTER — INPATIENT (INPATIENT)
Facility: HOSPITAL | Age: 83
LOS: 5 days | Discharge: NURSING, REHAB OR LONG-TERM CARE | DRG: 69 | End: 2018-08-02
Attending: HOSPITALIST | Admitting: HOSPITALIST
Payer: MEDICARE

## 2018-07-27 VITALS
RESPIRATION RATE: 18 BRPM | HEART RATE: 59 BPM | SYSTOLIC BLOOD PRESSURE: 173 MMHG | OXYGEN SATURATION: 98 % | DIASTOLIC BLOOD PRESSURE: 84 MMHG | TEMPERATURE: 98 F

## 2018-07-27 DIAGNOSIS — I63.9 CEREBRAL INFARCTION, UNSPECIFIED: ICD-10-CM

## 2018-07-27 PROBLEM — I10 ESSENTIAL (PRIMARY) HYPERTENSION: Chronic | Status: ACTIVE | Noted: 2017-07-20

## 2018-07-27 PROBLEM — K21.9 GASTRO-ESOPHAGEAL REFLUX DISEASE WITHOUT ESOPHAGITIS: Chronic | Status: ACTIVE | Noted: 2017-07-20

## 2018-07-27 PROBLEM — E11.9 TYPE 2 DIABETES MELLITUS WITHOUT COMPLICATIONS: Chronic | Status: ACTIVE | Noted: 2017-07-20

## 2018-07-27 PROBLEM — E78.00 PURE HYPERCHOLESTEROLEMIA, UNSPECIFIED: Chronic | Status: ACTIVE | Noted: 2017-07-20

## 2018-07-27 LAB
ALBUMIN SERPL ELPH-MCNC: 3.7 G/DL — SIGNIFICANT CHANGE UP (ref 3.3–5.2)
ALP SERPL-CCNC: 100 U/L — SIGNIFICANT CHANGE UP (ref 40–120)
ALT FLD-CCNC: 15 U/L — SIGNIFICANT CHANGE UP
ANION GAP SERPL CALC-SCNC: 12 MMOL/L — SIGNIFICANT CHANGE UP (ref 5–17)
ANISOCYTOSIS BLD QL: SLIGHT — SIGNIFICANT CHANGE UP
APTT BLD: 24.6 SEC — LOW (ref 27.5–37.4)
AST SERPL-CCNC: 19 U/L — SIGNIFICANT CHANGE UP
BASOPHILS # BLD AUTO: 0 K/UL — SIGNIFICANT CHANGE UP (ref 0–0.2)
BASOPHILS NFR BLD AUTO: 0.1 % — SIGNIFICANT CHANGE UP (ref 0–2)
BILIRUB SERPL-MCNC: 0.2 MG/DL — LOW (ref 0.4–2)
BUN SERPL-MCNC: 15 MG/DL — SIGNIFICANT CHANGE UP (ref 8–20)
CALCIUM SERPL-MCNC: 9.4 MG/DL — SIGNIFICANT CHANGE UP (ref 8.6–10.2)
CHLORIDE SERPL-SCNC: 103 MMOL/L — SIGNIFICANT CHANGE UP (ref 98–107)
CO2 SERPL-SCNC: 25 MMOL/L — SIGNIFICANT CHANGE UP (ref 22–29)
CREAT SERPL-MCNC: 0.43 MG/DL — LOW (ref 0.5–1.3)
EOSINOPHIL # BLD AUTO: 0.1 K/UL — SIGNIFICANT CHANGE UP (ref 0–0.5)
EOSINOPHIL NFR BLD AUTO: 2 % — SIGNIFICANT CHANGE UP (ref 0–6)
GLUCOSE BLDC GLUCOMTR-MCNC: 270 MG/DL — HIGH (ref 70–99)
GLUCOSE SERPL-MCNC: 258 MG/DL — HIGH (ref 70–115)
HCT VFR BLD CALC: 35.5 % — LOW (ref 37–47)
HGB BLD-MCNC: 11.5 G/DL — LOW (ref 12–16)
INR BLD: 1.12 RATIO — SIGNIFICANT CHANGE UP (ref 0.88–1.16)
LYMPHOCYTES # BLD AUTO: 1.6 K/UL — SIGNIFICANT CHANGE UP (ref 1–4.8)
LYMPHOCYTES # BLD AUTO: 21.8 % — SIGNIFICANT CHANGE UP (ref 20–55)
MACROCYTES BLD QL: SLIGHT — SIGNIFICANT CHANGE UP
MCHC RBC-ENTMCNC: 24.4 PG — LOW (ref 27–31)
MCHC RBC-ENTMCNC: 32.4 G/DL — SIGNIFICANT CHANGE UP (ref 32–36)
MCV RBC AUTO: 75.4 FL — LOW (ref 81–99)
MICROCYTES BLD QL: SLIGHT — SIGNIFICANT CHANGE UP
MONOCYTES # BLD AUTO: 0.4 K/UL — SIGNIFICANT CHANGE UP (ref 0–0.8)
MONOCYTES NFR BLD AUTO: 6 % — SIGNIFICANT CHANGE UP (ref 3–10)
NEUTROPHILS # BLD AUTO: 5 K/UL — SIGNIFICANT CHANGE UP (ref 1.8–8)
NEUTROPHILS NFR BLD AUTO: 70 % — SIGNIFICANT CHANGE UP (ref 37–73)
OVALOCYTES BLD QL SMEAR: SLIGHT — SIGNIFICANT CHANGE UP
PLAT MORPH BLD: NORMAL — SIGNIFICANT CHANGE UP
PLATELET # BLD AUTO: 289 K/UL — SIGNIFICANT CHANGE UP (ref 150–400)
POIKILOCYTOSIS BLD QL AUTO: SLIGHT — SIGNIFICANT CHANGE UP
POTASSIUM SERPL-MCNC: 4.3 MMOL/L — SIGNIFICANT CHANGE UP (ref 3.5–5.3)
POTASSIUM SERPL-SCNC: 4.3 MMOL/L — SIGNIFICANT CHANGE UP (ref 3.5–5.3)
PROT SERPL-MCNC: 7.3 G/DL — SIGNIFICANT CHANGE UP (ref 6.6–8.7)
PROTHROM AB SERPL-ACNC: 12.3 SEC — SIGNIFICANT CHANGE UP (ref 9.8–12.7)
RBC # BLD: 4.71 M/UL — SIGNIFICANT CHANGE UP (ref 4.4–5.2)
RBC # FLD: 20 % — HIGH (ref 11–15.6)
RBC BLD AUTO: ABNORMAL
SCHISTOCYTES BLD QL AUTO: SLIGHT — SIGNIFICANT CHANGE UP
SODIUM SERPL-SCNC: 140 MMOL/L — SIGNIFICANT CHANGE UP (ref 135–145)
TROPONIN T SERPL-MCNC: <0.01 NG/ML — SIGNIFICANT CHANGE UP (ref 0–0.06)
WBC # BLD: 7.1 K/UL — SIGNIFICANT CHANGE UP (ref 4.8–10.8)
WBC # FLD AUTO: 7.1 K/UL — SIGNIFICANT CHANGE UP (ref 4.8–10.8)

## 2018-07-27 PROCEDURE — 99291 CRITICAL CARE FIRST HOUR: CPT

## 2018-07-27 PROCEDURE — 70450 CT HEAD/BRAIN W/O DYE: CPT | Mod: 26

## 2018-07-27 PROCEDURE — 99223 1ST HOSP IP/OBS HIGH 75: CPT

## 2018-07-27 PROCEDURE — 93010 ELECTROCARDIOGRAM REPORT: CPT

## 2018-07-27 RX ORDER — INSULIN GLARGINE 100 [IU]/ML
30 INJECTION, SOLUTION SUBCUTANEOUS EVERY MORNING
Qty: 0 | Refills: 0 | Status: DISCONTINUED | OUTPATIENT
Start: 2018-07-27 | End: 2018-07-27

## 2018-07-27 RX ORDER — DEXTROSE 50 % IN WATER 50 %
25 SYRINGE (ML) INTRAVENOUS ONCE
Qty: 0 | Refills: 0 | Status: DISCONTINUED | OUTPATIENT
Start: 2018-07-27 | End: 2018-08-02

## 2018-07-27 RX ORDER — METOPROLOL TARTRATE 50 MG
25 TABLET ORAL
Qty: 0 | Refills: 0 | Status: DISCONTINUED | OUTPATIENT
Start: 2018-07-27 | End: 2018-07-31

## 2018-07-27 RX ORDER — ACETAMINOPHEN 500 MG
650 TABLET ORAL EVERY 6 HOURS
Qty: 0 | Refills: 0 | Status: DISCONTINUED | OUTPATIENT
Start: 2018-07-27 | End: 2018-08-02

## 2018-07-27 RX ORDER — FAMOTIDINE 10 MG/ML
20 INJECTION INTRAVENOUS DAILY
Qty: 0 | Refills: 0 | Status: DISCONTINUED | OUTPATIENT
Start: 2018-07-27 | End: 2018-08-02

## 2018-07-27 RX ORDER — ASPIRIN/CALCIUM CARB/MAGNESIUM 324 MG
81 TABLET ORAL DAILY
Qty: 0 | Refills: 0 | Status: DISCONTINUED | OUTPATIENT
Start: 2018-07-27 | End: 2018-08-02

## 2018-07-27 RX ORDER — GABAPENTIN 400 MG/1
100 CAPSULE ORAL THREE TIMES A DAY
Qty: 0 | Refills: 0 | Status: DISCONTINUED | OUTPATIENT
Start: 2018-07-27 | End: 2018-08-02

## 2018-07-27 RX ORDER — GLUCAGON INJECTION, SOLUTION 0.5 MG/.1ML
1 INJECTION, SOLUTION SUBCUTANEOUS ONCE
Qty: 0 | Refills: 0 | Status: DISCONTINUED | OUTPATIENT
Start: 2018-07-27 | End: 2018-08-02

## 2018-07-27 RX ORDER — HEPARIN SODIUM 5000 [USP'U]/ML
5000 INJECTION INTRAVENOUS; SUBCUTANEOUS EVERY 12 HOURS
Qty: 0 | Refills: 0 | Status: DISCONTINUED | OUTPATIENT
Start: 2018-07-27 | End: 2018-08-02

## 2018-07-27 RX ORDER — ASPIRIN/CALCIUM CARB/MAGNESIUM 324 MG
81 TABLET ORAL ONCE
Qty: 0 | Refills: 0 | Status: COMPLETED | OUTPATIENT
Start: 2018-07-27 | End: 2018-07-27

## 2018-07-27 RX ORDER — ATORVASTATIN CALCIUM 80 MG/1
40 TABLET, FILM COATED ORAL AT BEDTIME
Qty: 0 | Refills: 0 | Status: DISCONTINUED | OUTPATIENT
Start: 2018-07-27 | End: 2018-07-28

## 2018-07-27 RX ORDER — INSULIN GLARGINE 100 [IU]/ML
10 INJECTION, SOLUTION SUBCUTANEOUS AT BEDTIME
Qty: 0 | Refills: 0 | Status: DISCONTINUED | OUTPATIENT
Start: 2018-07-27 | End: 2018-07-27

## 2018-07-27 RX ORDER — DEXTROSE 50 % IN WATER 50 %
12.5 SYRINGE (ML) INTRAVENOUS ONCE
Qty: 0 | Refills: 0 | Status: DISCONTINUED | OUTPATIENT
Start: 2018-07-27 | End: 2018-08-02

## 2018-07-27 RX ORDER — LOSARTAN POTASSIUM 100 MG/1
50 TABLET, FILM COATED ORAL DAILY
Qty: 0 | Refills: 0 | Status: DISCONTINUED | OUTPATIENT
Start: 2018-07-27 | End: 2018-08-02

## 2018-07-27 RX ORDER — SODIUM CHLORIDE 9 MG/ML
1000 INJECTION, SOLUTION INTRAVENOUS
Qty: 0 | Refills: 0 | Status: DISCONTINUED | OUTPATIENT
Start: 2018-07-27 | End: 2018-08-02

## 2018-07-27 RX ORDER — PANTOPRAZOLE SODIUM 20 MG/1
1 TABLET, DELAYED RELEASE ORAL
Qty: 0 | Refills: 0 | COMMUNITY

## 2018-07-27 RX ORDER — DEXTROSE 50 % IN WATER 50 %
15 SYRINGE (ML) INTRAVENOUS ONCE
Qty: 0 | Refills: 0 | Status: DISCONTINUED | OUTPATIENT
Start: 2018-07-27 | End: 2018-08-02

## 2018-07-27 RX ORDER — AMLODIPINE BESYLATE 2.5 MG/1
10 TABLET ORAL DAILY
Qty: 0 | Refills: 0 | Status: DISCONTINUED | OUTPATIENT
Start: 2018-07-27 | End: 2018-08-02

## 2018-07-27 RX ORDER — INSULIN LISPRO 100/ML
VIAL (ML) SUBCUTANEOUS
Qty: 0 | Refills: 0 | Status: DISCONTINUED | OUTPATIENT
Start: 2018-07-27 | End: 2018-08-02

## 2018-07-27 RX ADMIN — Medication 25 MILLIGRAM(S): at 18:17

## 2018-07-27 RX ADMIN — Medication 0.2 MILLIGRAM(S): at 18:17

## 2018-07-27 RX ADMIN — Medication 81 MILLIGRAM(S): at 18:22

## 2018-07-27 RX ADMIN — AMLODIPINE BESYLATE 10 MILLIGRAM(S): 2.5 TABLET ORAL at 18:17

## 2018-07-27 RX ADMIN — FAMOTIDINE 20 MILLIGRAM(S): 10 INJECTION INTRAVENOUS at 18:17

## 2018-07-27 RX ADMIN — HEPARIN SODIUM 5000 UNIT(S): 5000 INJECTION INTRAVENOUS; SUBCUTANEOUS at 18:18

## 2018-07-27 NOTE — H&P ADULT - HISTORY OF PRESENT ILLNESS
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   In the ED patient has head ct negative, symptoms are still persistent, evaluated by tele stroke neurology from [facility] per er physician  not considered iv tpa candidate or candidate for tertiary endovascular intervention.

## 2018-07-27 NOTE — ED PROVIDER NOTE - PROGRESS NOTE DETAILS
Tele neuro consulted, pt is not a TPA candidate because of past cerebral bleed January 2018. After consult with tele neuro; pt is not candidate for TPA or intra vascular intervention. Will consult with neurology. After consult with tele neuro; pt is not candidate for TPA or  endovascular intervention. Will consult with neurology. Patient to be admitted to medicine for stroke workup, neurology to see

## 2018-07-27 NOTE — STROKE CODE NOTE - ABSOLUTE EXCLUSION OTHER CRITERIA
Prior basal ganglia bleed contraindication for TPA. D/w stroke team and given low stroke scale and age and comorbidities do not think endovascular candidate. Discussion at [id number]

## 2018-07-27 NOTE — H&P ADULT - ASSESSMENT
Assessment: Given above info, patient is having stroke Vs TIA    Plan:     Stroke Vs TIA: Will admit to the stepdown unit, will do neuro checks, will start on aspirin 81mg daily, will start atorvastatin, Neurology consult appreciated, will get MRI, MRA, Carotid duplex and TTE, will also obtain TSH, Lipid panel and Hba1c, will monitor patient closely, will get PT and OT.     DM: Will hold of on her insulin she takes 30units in am and 10 units a bedtime,     HTN: Will resume home meds, will hold if patient BP is <130 systolic.      GERD: Protonix    DVT prophylaxis: Heparin SC.     Code status: patient is full code.

## 2018-07-27 NOTE — H&P ADULT - NSHPREVIEWOFSYSTEMS_GEN_ALL_CORE
CONSTITUTIONAL: No fever,fatigue  RESPIRATORY: No cough, wheezing, hemoptysis; No shortness of breath  CARDIOVASCULAR: No chest pain, palpitations  GASTROINTESTINAL: No abdominal or epigastric pain. No nausea, vomiting  NEUROLOGICAL: No headaches,  loss of strength.  MISCELLANEOUS: No joint swelling or pain

## 2018-07-27 NOTE — ED PROVIDER NOTE - MEDICAL DECISION MAKING DETAILS
Code stroke, no metabolic causes, unlikely TPA candidate, low stroke scale ?able endovascular candidate but will defer to stroke team.

## 2018-07-27 NOTE — H&P ADULT - NSHPLABSRESULTS_GEN_ALL_CORE
11.5   7.1   )-----------( 289      ( 27 Jul 2018 12:35 )             35.5     07-27    140  |  103  |  15.0  ----------------------------<  258<H>  4.3   |  25.0  |  0.43<L>    Ca    9.4      27 Jul 2018 12:35    TPro  7.3  /  Alb  3.7  /  TBili  0.2<L>  /  DBili  x   /  AST  19  /  ALT  15  /  AlkPhos  100  07-27    PT/INR - ( 27 Jul 2018 12:35 )   PT: 12.3 sec;   INR: 1.12 ratio         PTT - ( 27 Jul 2018 12:35 )  PTT:24.6 sec 11.5   7.1   )-----------( 289      ( 27 Jul 2018 12:35 )             35.5     07-27    140  |  103  |  15.0  ----------------------------<  258<H>  4.3   |  25.0  |  0.43<L>    Ca    9.4      27 Jul 2018 12:35    TPro  7.3  /  Alb  3.7  /  TBili  0.2<L>  /  DBili  x   /  AST  19  /  ALT  15  /  AlkPhos  100  07-27    PT/INR - ( 27 Jul 2018 12:35 )   PT: 12.3 sec;   INR: 1.12 ratio         PTT - ( 27 Jul 2018 12:35 )  PTT:24.6 sec    < from: CT Brain Stroke Protocol (07.27.18 @ 12:15) >    Moderate chronic microvascular changes without evidence of   an acute transcortical infarction or hemorrhage. MR is a more sensitive   imaging modality for the evaluation of an acute infarction. Findings   discussed with Dr. Rg at 12:15 PM.    < end of copied text >

## 2018-07-27 NOTE — H&P ADULT - NSHPPHYSICALEXAM_GEN_ALL_CORE
Vital Signs Last 24 Hrs  T(C): 36.8 (27 Jul 2018 12:00), Max: 36.8 (27 Jul 2018 12:00)  T(F): 98.2 (27 Jul 2018 12:00), Max: 98.2 (27 Jul 2018 12:00)  HR: 52 (27 Jul 2018 12:54) (52 - 59)  BP: 139/66 (27 Jul 2018 12:54) (139/66 - 173/84)  RR: 19 (27 Jul 2018 12:54) (18 - 20)  SpO2: 96% (27 Jul 2018 12:54) (96% - 99%)    PHYSICAL EXAM:    GENERAL: Elderly male looking   HEENT: PERRL, +EOMI  NECK: soft, Supple, No JVD,   CHEST/LUNG: Clear to auscultate bilaterally; No wheezing  HEART: S1S2+, Regular rate and rhythm; No murmurs, rubs, or gallops  ABDOMEN: Soft, Nontender, Nondistended; Bowel sounds present  EXTREMITIES:  2+ Peripheral Pulses, No clubbing, cyanosis, or edema  SKIN: No rashes or lesions  NEURO: AAOX3, no focal deficits, no motor r sensory loss  PSYCH: normal mood Vital Signs Last 24 Hrs  T(C): 36.8 (27 Jul 2018 12:00), Max: 36.8 (27 Jul 2018 12:00)  T(F): 98.2 (27 Jul 2018 12:00), Max: 98.2 (27 Jul 2018 12:00)  HR: 52 (27 Jul 2018 12:54) (52 - 59)  BP: 139/66 (27 Jul 2018 12:54) (139/66 - 173/84)  RR: 19 (27 Jul 2018 12:54) (18 - 20)  SpO2: 96% (27 Jul 2018 12:54) (96% - 99%)    PHYSICAL EXAM:    GENERAL: Elderly female looking comfortable  HEENT: PERRL, +EOMI, left fascial droop  NECK: soft, Supple, No JVD,   CHEST/LUNG: Clear to auscultate bilaterally; No wheezing  HEART: S1S2+, Regular rate and rhythm; No murmurs  ABDOMEN: Soft, Nontender, Nondistended; Bowel sounds present  EXTREMITIES:  1+ Peripheral Pulses, No edema  SKIN: No rashes or lesions  NEURO: AAOX2, she has 3/5 power on the right side and 4/5 on the left side, sensation is less on the left side, left fascial droop, DTRs are +1.   PSYCH: normal mood

## 2018-07-27 NOTE — ED PROVIDER NOTE - OBJECTIVE STATEMENT
84 y/o F pt with hx of DM, HTN and HLD BIBA with acute onset of right sided facial droop and increased confusion from baseline. Pt had witnessed facial right droop at 11:00 earlier today with increased confusion from baseline. Codestroke initiated.  : Fabiana 84 y/o F pt with hx of cerebral bleed (January 2018) DM, HTN and HLD BIBA with acute onset of right sided facial droop and increased confusion from baseline. Pt had witnessed facial right droop at 11:00 ( last known well) earlier today with increased confusion from baseline. Code stroke initiated.  : Fabiana

## 2018-07-27 NOTE — CONSULT NOTE ADULT - ASSESSMENT
new left face weakness with mild confusion, right arm and leg weakness seems to be old due to ich 11/2017, current ct shows resolution of bleed, ok to start asa 81 mg per day, recommend mri brain, mtra brain, cd, echo. tele.

## 2018-07-27 NOTE — ED ADULT TRIAGE NOTE - CHIEF COMPLAINT QUOTE
pt BIBA from [facility] for left facial droop, as per ems, last known well was 11am by nursing assistant, has hx of stroke 6 months ago, Dr Menezes at bedside at 1158 for pt jesusita, code stroke called by Dr menezes at 1201, code team called to bedside.

## 2018-07-27 NOTE — ED PROVIDER NOTE - NS ED ROS FT
CONST: no fevers, no chills, no trauma  EYES: no pain, no visual disturbances  ENT: no sore throat, no epistaxis, no rhinorrhea, no hearing changes  CV: no chest pain, no palpitations, no orthopnea, no extremity pain or swelling  RESP: no shortness of breath, no cough, no sputum, no pleurisy, no wheezing  ABD: no abdominal pain, no nausea, no vomiting, no diarrhea, no black or bloody stool  : no dysuria, no hematuria, no frequency, no urgency  MSK: no back pain, no neck pain, no extremity pain  NEURO: no headache, no sensory disturbances, no focal weakness, no dizziness, + right sided facial droop,  HEME: no easy bleeding or bruising  SKIN: no diaphoresis, no rash CONST: no fevers, no chills, no trauma  EYES: no pain, no visual disturbances  ENT: no sore throat, no epistaxis, no rhinorrhea, no hearing changes  CV: no chest pain, no palpitations, no orthopnea, no extremity pain or swelling  RESP: no shortness of breath, no cough, no sputum, no pleurisy, no wheezing  ABD: no abdominal pain, no nausea, no vomiting, no diarrhea, no black or bloody stool  : no dysuria, no hematuria, no frequency, no urgency  MSK: no back pain, no neck pain, no extremity pain  NEURO: no headache, no sensory disturbances, no focal weakness, no dizziness, + right sided facial droop, +confusion  HEME: no easy bleeding or bruising  SKIN: no diaphoresis, no rash see HPI for details

## 2018-07-27 NOTE — CONSULT NOTE ADULT - SUBJECTIVE AND OBJECTIVE BOX
HPI:  83 h/o left bg bleed with ivh in 11/2017 with residual right sided weakness, patient has been residing at [facility], earlier today noted to face asymmetry and some confusion, patient has head ct negative, symptoms are still persistant,. patient has been evaluated by tele stroke neurology from [facility] per er physician  not considered iv tpa candidate or candidate for  tertiary endovascular intervention.     PAST MEDICAL & SURGICAL HISTORY:  GERD (gastroesophageal reflux disease)  Diabetes mellitus  High cholesterol  Hypertension      REVIEW OF SYSTEMS:      MEDICATIONS  (STANDING):  aspirin  chewable 81 milliGRAM(s) Oral once    MEDICATIONS  (PRN):      Allergies    No Known Allergies    Intolerances        SOCIAL HISTORY:    FAMILY HISTORY:  No pertinent family history in first degree relatives      PHYSICAL EXAM:  Vital Signs Last 24 Hrs  T(F): 98.2 (07-27-18 @ 12:00)  HR: 52 (07-27-18 @ 12:54)  BP: 139/66 (07-27-18 @ 12:54)  RR: 19 (07-27-18 @ 12:54)    GENERAL: NAD, well-groomed, well-developed  HEAD:  Atraumatic, Normocephalic  EYES: EOMI, PERRLA,  NECK: Supple,  NERVOUS SYSTEM:  Alert & Oriented X3, speech and language normal, cranial nerves exam suggestive of left decreased nasolibial fold, weakness of left orbicularis oculi muscle,     Motor Strength right UE 4/5, right LE 2-3/5, left UE 5/5, left LE 4+/5. DTRs 2+ intact and symmetric, plantar responses flexor bilaterally, sensory exam normal to light touch both sides.   HEART: Regular rate and rhythm; No murmurs, rubs, or gallops          LABS:                        11.5   7.1   )-----------( 289      ( 27 Jul 2018 12:35 )             35.5     07-27    140  |  103  |  15.0  ----------------------------<  258<H>  4.3   |  25.0  |  0.43<L>    Ca    9.4      27 Jul 2018 12:35    TPro  7.3  /  Alb  3.7  /  TBili  0.2<L>  /  DBili  x   /  AST  19  /  ALT  15  /  AlkPhos  100  07-27    PT/INR - ( 27 Jul 2018 12:35 )   PT: 12.3 sec;   INR: 1.12 ratio         PTT - ( 27 Jul 2018 12:35 )  PTT:24.6 sec      RADIOLOGY & ADDITIONAL STUDIES:

## 2018-07-27 NOTE — ED PROVIDER NOTE - PHYSICAL EXAMINATION
VITALS: reviewed  GEN: NAD, A & O x 4  HEAD/EYES: NCAT, PERRL, EOMI, anicteric sclerae, no conjunctival pallor  ENT: mucus membranes moist, oropharynx WNL, trachea midline, no JVD  RESP: lungs CTA with equal breath sounds bilaterally, chest wall nontender and atraumatic  CV: heart with reg rhythm S1, S2, no murmur; distal pulses intact and symmetric bilaterally  ABDOMEN: normoactive bowel sounds, soft, nondistended, nontender, no palpable masses  : no CVAT  MSK: extremities atraumatic and nontender, no edema, no assymetry. the back is without midline or lateral tenderness, there is no spinal deformity or stepoff and the back is ranged painlessly. the neck has no midline tenderness, deformity, or stepoff, and is ranged painlessly.  SKIN: warm, dry, no rash, no bruising, no cyanosis. color appropriate for ethnicity  NEURO: alert, mentating appropriately, no facial asymmetry. gross sensation, motor, coordination are intact  PSYCH: Affect appropriate See Code Stroke note.    Right sided facial droop, right sided drift, questionable ataxia. Both legs with equal symmetrical drift, likely not focal. GEN: well appearing, nontoxic, not in apparent distress  HEENT: R-facial droop,s ee  CV: normal rate, regular rhythm  RESP: lungs clear to auscultation bilaterally, equal breath sounds bilaterally, protecting airway  ABD: the abdomen is soft, nontender, and nondistended, there are no palpable masses or organomegaly  : no CVAT  MSK: no extremity deformity or tenderness  SKIN: warm, dry  NEURO: See Code Stroke note. Right sided facial droop, right sided drift, questionable ataxia. Both legs with equal symmetrical drift, likely not focal.

## 2018-07-28 LAB
ALBUMIN SERPL ELPH-MCNC: 3.6 G/DL — SIGNIFICANT CHANGE UP (ref 3.3–5.2)
ALP SERPL-CCNC: 92 U/L — SIGNIFICANT CHANGE UP (ref 40–120)
ALT FLD-CCNC: 16 U/L — SIGNIFICANT CHANGE UP
ANION GAP SERPL CALC-SCNC: 13 MMOL/L — SIGNIFICANT CHANGE UP (ref 5–17)
AST SERPL-CCNC: 18 U/L — SIGNIFICANT CHANGE UP
BILIRUB SERPL-MCNC: 0.2 MG/DL — LOW (ref 0.4–2)
BUN SERPL-MCNC: 12 MG/DL — SIGNIFICANT CHANGE UP (ref 8–20)
CALCIUM SERPL-MCNC: 9.5 MG/DL — SIGNIFICANT CHANGE UP (ref 8.6–10.2)
CHLORIDE SERPL-SCNC: 102 MMOL/L — SIGNIFICANT CHANGE UP (ref 98–107)
CHOLEST SERPL-MCNC: 94 MG/DL — LOW (ref 110–199)
CO2 SERPL-SCNC: 26 MMOL/L — SIGNIFICANT CHANGE UP (ref 22–29)
CREAT SERPL-MCNC: 0.41 MG/DL — LOW (ref 0.5–1.3)
GLUCOSE BLDC GLUCOMTR-MCNC: 212 MG/DL — HIGH (ref 70–99)
GLUCOSE BLDC GLUCOMTR-MCNC: 253 MG/DL — HIGH (ref 70–99)
GLUCOSE BLDC GLUCOMTR-MCNC: 256 MG/DL — HIGH (ref 70–99)
GLUCOSE BLDC GLUCOMTR-MCNC: 291 MG/DL — HIGH (ref 70–99)
GLUCOSE SERPL-MCNC: 200 MG/DL — HIGH (ref 70–115)
HBA1C BLD-MCNC: 7.8 % — HIGH (ref 4–5.6)
HCT VFR BLD CALC: 35.7 % — LOW (ref 37–47)
HDLC SERPL-MCNC: 49 MG/DL — LOW
HGB BLD-MCNC: 11.7 G/DL — LOW (ref 12–16)
INR BLD: 1.18 RATIO — HIGH (ref 0.88–1.16)
LIPID PNL WITH DIRECT LDL SERPL: 20 MG/DL — SIGNIFICANT CHANGE UP
MCHC RBC-ENTMCNC: 24.7 PG — LOW (ref 27–31)
MCHC RBC-ENTMCNC: 32.8 G/DL — SIGNIFICANT CHANGE UP (ref 32–36)
MCV RBC AUTO: 75.5 FL — LOW (ref 81–99)
PLATELET # BLD AUTO: 267 K/UL — SIGNIFICANT CHANGE UP (ref 150–400)
POTASSIUM SERPL-MCNC: 3.9 MMOL/L — SIGNIFICANT CHANGE UP (ref 3.5–5.3)
POTASSIUM SERPL-SCNC: 3.9 MMOL/L — SIGNIFICANT CHANGE UP (ref 3.5–5.3)
PROT SERPL-MCNC: 7.4 G/DL — SIGNIFICANT CHANGE UP (ref 6.6–8.7)
PROTHROM AB SERPL-ACNC: 13 SEC — HIGH (ref 9.8–12.7)
RBC # BLD: 4.73 M/UL — SIGNIFICANT CHANGE UP (ref 4.4–5.2)
RBC # FLD: 19.9 % — HIGH (ref 11–15.6)
SODIUM SERPL-SCNC: 141 MMOL/L — SIGNIFICANT CHANGE UP (ref 135–145)
TOTAL CHOLESTEROL/HDL RATIO MEASUREMENT: 2 RATIO — LOW (ref 3.3–7.1)
TRIGL SERPL-MCNC: 126 MG/DL — SIGNIFICANT CHANGE UP (ref 10–200)
TSH SERPL-MCNC: 1.31 UIU/ML — SIGNIFICANT CHANGE UP (ref 0.27–4.2)
WBC # BLD: 5.2 K/UL — SIGNIFICANT CHANGE UP (ref 4.8–10.8)
WBC # FLD AUTO: 5.2 K/UL — SIGNIFICANT CHANGE UP (ref 4.8–10.8)

## 2018-07-28 PROCEDURE — 99233 SBSQ HOSP IP/OBS HIGH 50: CPT

## 2018-07-28 PROCEDURE — 93306 TTE W/DOPPLER COMPLETE: CPT | Mod: 26

## 2018-07-28 PROCEDURE — 93880 EXTRACRANIAL BILAT STUDY: CPT | Mod: 26

## 2018-07-28 RX ORDER — INSULIN GLARGINE 100 [IU]/ML
20 INJECTION, SOLUTION SUBCUTANEOUS EVERY MORNING
Qty: 0 | Refills: 0 | Status: DISCONTINUED | OUTPATIENT
Start: 2018-07-28 | End: 2018-08-02

## 2018-07-28 RX ORDER — INSULIN GLARGINE 100 [IU]/ML
10 INJECTION, SOLUTION SUBCUTANEOUS AT BEDTIME
Qty: 0 | Refills: 0 | Status: DISCONTINUED | OUTPATIENT
Start: 2018-07-28 | End: 2018-07-31

## 2018-07-28 RX ADMIN — Medication 1 DROP(S): at 06:32

## 2018-07-28 RX ADMIN — GABAPENTIN 100 MILLIGRAM(S): 400 CAPSULE ORAL at 06:39

## 2018-07-28 RX ADMIN — LOSARTAN POTASSIUM 50 MILLIGRAM(S): 100 TABLET, FILM COATED ORAL at 06:32

## 2018-07-28 RX ADMIN — FAMOTIDINE 20 MILLIGRAM(S): 10 INJECTION INTRAVENOUS at 13:45

## 2018-07-28 RX ADMIN — Medication 0.2 MILLIGRAM(S): at 17:46

## 2018-07-28 RX ADMIN — HEPARIN SODIUM 5000 UNIT(S): 5000 INJECTION INTRAVENOUS; SUBCUTANEOUS at 17:46

## 2018-07-28 RX ADMIN — HEPARIN SODIUM 5000 UNIT(S): 5000 INJECTION INTRAVENOUS; SUBCUTANEOUS at 06:32

## 2018-07-28 RX ADMIN — GABAPENTIN 100 MILLIGRAM(S): 400 CAPSULE ORAL at 21:58

## 2018-07-28 RX ADMIN — Medication 2: at 17:46

## 2018-07-28 RX ADMIN — AMLODIPINE BESYLATE 10 MILLIGRAM(S): 2.5 TABLET ORAL at 09:35

## 2018-07-28 RX ADMIN — Medication 25 MILLIGRAM(S): at 06:31

## 2018-07-28 RX ADMIN — INSULIN GLARGINE 10 UNIT(S): 100 INJECTION, SOLUTION SUBCUTANEOUS at 21:59

## 2018-07-28 RX ADMIN — Medication 3: at 13:46

## 2018-07-28 RX ADMIN — Medication 0.2 MILLIGRAM(S): at 06:31

## 2018-07-28 RX ADMIN — Medication 81 MILLIGRAM(S): at 13:45

## 2018-07-28 RX ADMIN — GABAPENTIN 100 MILLIGRAM(S): 400 CAPSULE ORAL at 13:46

## 2018-07-28 RX ADMIN — GABAPENTIN 100 MILLIGRAM(S): 400 CAPSULE ORAL at 06:31

## 2018-07-28 RX ADMIN — Medication 25 MILLIGRAM(S): at 18:50

## 2018-07-28 NOTE — ED ADULT NURSE REASSESSMENT NOTE - NS ED NURSE REASSESS COMMENT FT1
assumed care of pt at this time. pt sleeping comfortably, responds to verbal stimuli refusing to remove blanket from face. pt placed back on monitor, sinus uzma @ 52, maintaining 02 on RA @ 99%. pt in no apparent distress or discomfort, will continue to monitor.

## 2018-07-28 NOTE — SWALLOW BEDSIDE ASSESSMENT ADULT - SLP PERTINENT HISTORY OF CURRENT PROBLEM
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 Pt admitted with more pronounced facial asymmetry and increased confusion to r/o CVA. CT current is negative for acute infarct. Pt is known to this service and was last seen 11/28 at bedside with a rx for puree/ thin liquids. Tranfer paperwork from SNF notes pt is on a "ground" diet.

## 2018-07-28 NOTE — PROGRESS NOTE ADULT - SUBJECTIVE AND OBJECTIVE BOX
ZION LEACH    [id number]    83y      Female    Patient is a 83y old  Female who presents with a chief complaint of Facial droop (27 Jul 2018 16:22)      INTERVAL HPI/OVERNIGHT EVENTS:  She has some improvement of the power  of the right arm, she   REVIEW OF SYSTEMS:    CONSTITUTIONAL: No fever, has fatigue  RESPIRATORY: No cough, No shortness of breath  CARDIOVASCULAR: No chest pain, palpitations  GASTROINTESTINAL: No abdominal, No nausea, vomiting  NEUROLOGICAL: No headaches,  loss of strength.  MISCELLANEOUS: No joint swelling or pain      Vital Signs Last 24 Hrs  T(C): 36.8 (28 Jul 2018 01:52), Max: 36.8 (27 Jul 2018 12:00)  T(F): 98.3 (28 Jul 2018 01:52), Max: 98.3 (28 Jul 2018 01:52)  HR: 55 (28 Jul 2018 09:33) (52 - 59)  BP: 178/77 (28 Jul 2018 09:33) (139/66 - 189/81)  RR: 16 (28 Jul 2018 09:33) (16 - 20)  SpO2: 100% (28 Jul 2018 09:33) (96% - 100%)    PHYSICAL EXAM:    GENERAL: Elderly female looking comfortable  HEENT: PERRL, +EOMI, left fascial droop  NECK: soft, Supple, No JVD,   CHEST/LUNG: Clear to auscultate bilaterally; No wheezing  HEART: S1S2+, Regular rate and rhythm; No murmurs  ABDOMEN: Soft, Nontender, Nondistended; Bowel sounds present  EXTREMITIES:  1+ Peripheral Pulses, No edema  SKIN: No rashes or lesions  NEURO: AAOX2, she has 4/5 power on the right side and 5/5 on the left side, sensation is less on the left side, left fascial droop, DTRs are +1.   PSYCH: normal mood      LABS:                        11.7   5.2   )-----------( 267      ( 28 Jul 2018 08:38 )             35.7     07-28    141  |  102  |  12.0  ----------------------------<  200<H>  3.9   |  26.0  |  0.41<L>    Ca    9.5      28 Jul 2018 08:38    TPro  7.4  /  Alb  3.6  /  TBili  0.2<L>  /  DBili  x   /  AST  18  /  ALT  16  /  AlkPhos  92  07-28    PT/INR - ( 28 Jul 2018 08:38 )   PT: 13.0 sec;   INR: 1.18 ratio         PTT - ( 27 Jul 2018 12:35 )  PTT:24.6 sec        I&O's Summary      MEDICATIONS  (STANDING):  amLODIPine   Tablet 10 milliGRAM(s) Oral daily  artificial  tears Solution 1 Drop(s) Both EYES two times a day  aspirin enteric coated 81 milliGRAM(s) Oral daily  atorvastatin 40 milliGRAM(s) Oral at bedtime  cloNIDine 0.2 milliGRAM(s) Oral two times a day  dextrose 5%. 1000 milliLiter(s) (50 mL/Hr) IV Continuous <Continuous>  dextrose 50% Injectable 12.5 Gram(s) IV Push once  dextrose 50% Injectable 25 Gram(s) IV Push once  dextrose 50% Injectable 25 Gram(s) IV Push once  famotidine    Tablet 20 milliGRAM(s) Oral daily  gabapentin 100 milliGRAM(s) Oral three times a day  heparin  Injectable 5000 Unit(s) SubCutaneous every 12 hours  insulin glargine Injectable (LANTUS) 10 Unit(s) SubCutaneous at bedtime  insulin glargine Injectable (LANTUS) 20 Unit(s) SubCutaneous every morning  insulin lispro (HumaLOG) corrective regimen sliding scale   SubCutaneous three times a day before meals  losartan 50 milliGRAM(s) Oral daily  metoprolol tartrate 25 milliGRAM(s) Oral two times a day    MEDICATIONS  (PRN):  acetaminophen   Tablet 650 milliGRAM(s) Oral every 6 hours PRN Pain, fever, headache  dextrose 40% Gel 15 Gram(s) Oral once PRN Blood Glucose LESS THAN 70 milliGRAM(s)/deciliter  glucagon  Injectable 1 milliGRAM(s) IntraMuscular once PRN Glucose LESS THAN 70 milligrams/deciliter ZION LEACH    [id number]    83y      Female    Patient is a 83y old  Female who presents with a chief complaint of Facial droop (27 Jul 2018 16:22)      INTERVAL HPI/OVERNIGHT EVENTS:  She has some improvement of the power  of the right arm, she denies fever, chills, chest pain, nausea or vomiting.   REVIEW OF SYSTEMS:    CONSTITUTIONAL: No fever, has fatigue  RESPIRATORY: No cough, No shortness of breath  CARDIOVASCULAR: No chest pain, palpitations  GASTROINTESTINAL: No abdominal, No nausea, vomiting  NEUROLOGICAL: No headaches,  loss of strength.  MISCELLANEOUS: No joint swelling or pain      Vital Signs Last 24 Hrs  T(C): 36.8 (28 Jul 2018 01:52), Max: 36.8 (27 Jul 2018 12:00)  T(F): 98.3 (28 Jul 2018 01:52), Max: 98.3 (28 Jul 2018 01:52)  HR: 55 (28 Jul 2018 09:33) (52 - 59)  BP: 178/77 (28 Jul 2018 09:33) (139/66 - 189/81)  RR: 16 (28 Jul 2018 09:33) (16 - 20)  SpO2: 100% (28 Jul 2018 09:33) (96% - 100%)    PHYSICAL EXAM:    GENERAL: Elderly female looking comfortable  HEENT: PERRL, +EOMI, left fascial droop  NECK: soft, Supple, No JVD,   CHEST/LUNG: Clear to auscultate bilaterally; No wheezing  HEART: S1S2+, Regular rate and rhythm; No murmurs  ABDOMEN: Soft, Nontender, Nondistended; Bowel sounds present  EXTREMITIES:  1+ Peripheral Pulses, No edema  SKIN: No rashes or lesions  NEURO: AAOX2, she has 4/5 power on the right side and 5/5 on the left side, sensation is less on the left side, left fascial droop, DTRs are +1.   PSYCH: normal mood      LABS:                        11.7   5.2   )-----------( 267      ( 28 Jul 2018 08:38 )             35.7     07-28    141  |  102  |  12.0  ----------------------------<  200<H>  3.9   |  26.0  |  0.41<L>    Ca    9.5      28 Jul 2018 08:38    TPro  7.4  /  Alb  3.6  /  TBili  0.2<L>  /  DBili  x   /  AST  18  /  ALT  16  /  AlkPhos  92  07-28    PT/INR - ( 28 Jul 2018 08:38 )   PT: 13.0 sec;   INR: 1.18 ratio         PTT - ( 27 Jul 2018 12:35 )  PTT:24.6 sec        I&O's Summary      MEDICATIONS  (STANDING):  amLODIPine   Tablet 10 milliGRAM(s) Oral daily  artificial  tears Solution 1 Drop(s) Both EYES two times a day  aspirin enteric coated 81 milliGRAM(s) Oral daily  atorvastatin 40 milliGRAM(s) Oral at bedtime  cloNIDine 0.2 milliGRAM(s) Oral two times a day  dextrose 5%. 1000 milliLiter(s) (50 mL/Hr) IV Continuous <Continuous>  dextrose 50% Injectable 12.5 Gram(s) IV Push once  dextrose 50% Injectable 25 Gram(s) IV Push once  dextrose 50% Injectable 25 Gram(s) IV Push once  famotidine    Tablet 20 milliGRAM(s) Oral daily  gabapentin 100 milliGRAM(s) Oral three times a day  heparin  Injectable 5000 Unit(s) SubCutaneous every 12 hours  insulin glargine Injectable (LANTUS) 10 Unit(s) SubCutaneous at bedtime  insulin glargine Injectable (LANTUS) 20 Unit(s) SubCutaneous every morning  insulin lispro (HumaLOG) corrective regimen sliding scale   SubCutaneous three times a day before meals  losartan 50 milliGRAM(s) Oral daily  metoprolol tartrate 25 milliGRAM(s) Oral two times a day    MEDICATIONS  (PRN):  acetaminophen   Tablet 650 milliGRAM(s) Oral every 6 hours PRN Pain, fever, headache  dextrose 40% Gel 15 Gram(s) Oral once PRN Blood Glucose LESS THAN 70 milliGRAM(s)/deciliter  glucagon  Injectable 1 milliGRAM(s) IntraMuscular once PRN Glucose LESS THAN 70 milligrams/deciliter

## 2018-07-28 NOTE — ED ADULT NURSE REASSESSMENT NOTE - NS ED NURSE REASSESS COMMENT FT1
Late Note time approximate  Pt with large bowel movement soft, brown stool and large void cleaned, gown and linens changed, am care provided, will continue to monitor.

## 2018-07-28 NOTE — SWALLOW BEDSIDE ASSESSMENT ADULT - ASR SWALLOW ASPIRATION MONITOR
oral hygiene/pneumonia/throat clearing/change of breathing pattern/position upright (89Y)/cough/gurgly voice/fever

## 2018-07-28 NOTE — PROGRESS NOTE ADULT - SUBJECTIVE AND OBJECTIVE BOX
ZION LEACH    [id number]    83y      Female    INTERVAL HPI/OVERNIGHT EVENTS:   no new symptoms reported by patient or staff.    Vital Signs Last 24 Hrs  T(C): 36.8 (28 Jul 2018 01:52), Max: 36.8 (27 Jul 2018 12:00)  T(F): 98.3 (28 Jul 2018 01:52), Max: 98.3 (28 Jul 2018 01:52)  HR: 54 (28 Jul 2018 06:26) (52 - 59)  BP: 189/81 (28 Jul 2018 06:26) (139/66 - 189/81)  BP(mean): --  RR: 18 (28 Jul 2018 06:26) (18 - 20)  SpO2: 99% (28 Jul 2018 06:26) (96% - 100%)    PHYSICAL EXAM:    GENERAL: no acute distress. dressed appropriate for hospitalization.  NEURO: awake, alert and interactive. speech dysarthric, follows simple commands in Burmese.  Visual fields full to movement left eye, right ptosis with deviation of right eye. facial strength reduced with flattening bilat NLF  no finger to nose ataxia on right, Left FTN impaired. Motor strength right UE 3/5 worse distal, right LE 3-/5 worse distal, LUE and LLE 5-/5. bilateral babinski.  PSYCH: mood appropriate, cooperative.     LABS:                        11.7   5.2   )-----------( 267      ( 28 Jul 2018 08:38 )             35.7     07-27    140  |  103  |  15.0  ----------------------------<  258<H>  4.3   |  25.0  |  0.43<L>    Ca    9.4      27 Jul 2018 12:35  TPro  7.3  /  Alb  3.7  /  TBili  0.2<L>  /  DBili  x   /  AST  19  /  ALT  15  /  AlkPhos  100  07-27    PT/INR - ( 28 Jul 2018 08:38 )   PT: 13.0 sec;   INR: 1.18 ratio    PTT - ( 27 Jul 2018 12:35 )  PTT:24.6 sec    MEDICATIONS  (STANDING):  amLODIPine   Tablet 10 milliGRAM(s) Oral daily  artificial  tears Solution 1 Drop(s) Both EYES two times a day  aspirin enteric coated 81 milliGRAM(s) Oral daily  atorvastatin 40 milliGRAM(s) Oral at bedtime  cloNIDine 0.2 milliGRAM(s) Oral two times a day  dextrose 5%. 1000 milliLiter(s) (50 mL/Hr) IV Continuous <Continuous>  dextrose 50% Injectable 12.5 Gram(s) IV Push once  dextrose 50% Injectable 25 Gram(s) IV Push once  dextrose 50% Injectable 25 Gram(s) IV Push once  famotidine    Tablet 20 milliGRAM(s) Oral daily  gabapentin 100 milliGRAM(s) Oral three times a day  heparin  Injectable 5000 Unit(s) SubCutaneous every 12 hours  insulin lispro (HumaLOG) corrective regimen sliding scale   SubCutaneous three times a day before meals  losartan 50 milliGRAM(s) Oral daily  metoprolol tartrate 25 milliGRAM(s) Oral two times a day    MEDICATIONS  (PRN):  acetaminophen   Tablet 650 milliGRAM(s) Oral every 6 hours PRN Pain, fever, headache  dextrose 40% Gel 15 Gram(s) Oral once PRN Blood Glucose LESS THAN 70 milliGRAM(s)/deciliter  glucagon  Injectable 1 milliGRAM(s) IntraMuscular once PRN Glucose LESS THAN 70 milligrams/deciliter      RADIOLOGY & ADDITIONAL TESTS:   CT brain - no new stroke. old changes from prior bleed noted.

## 2018-07-28 NOTE — ED ADULT NURSE REASSESSMENT NOTE - NS ED NURSE REASSESS COMMENT FT1
Late Note  Report received at change of shift from outgoing RN JIMENEZ Higuera and assumed care of pt at that time. Pt received on stretcher asleep but easily arouses to voice, no acute distress, no complaints, will continue to monitor.

## 2018-07-28 NOTE — SWALLOW BEDSIDE ASSESSMENT ADULT - SWALLOW EVAL: DIAGNOSIS
Mild oral dysphagia marked by reduced mastication with mechanical soft solids, mild lingual stasis, and further confounded by reduced cognition. Pharyngeal stage appears WFL for administered consistencies.

## 2018-07-28 NOTE — PROGRESS NOTE ADULT - PROBLEM SELECTOR PLAN 1
MRI brain, MRA brain/carotid (defer duplex)  risk factor control of sugar-lipids-BP  tele-monitor cardiac rhythm  ASA  mobilize to chair, PT eval   transfer back to SNF when stable

## 2018-07-28 NOTE — SWALLOW BEDSIDE ASSESSMENT ADULT - SLP GENERAL OBSERVATIONS
Pt received A&A on stretcher in ED, 0x1, reduced cognition and poor historian, pleasant and agreeable to evaluation Pt received A&A on stretcher in ED, 0x1,+Georgian speaking, Language Line #[id number] used for interpretation,  reduced cognition and poor historian, pleasant and agreeable to evaluation

## 2018-07-29 LAB
GLUCOSE BLDC GLUCOMTR-MCNC: 214 MG/DL — HIGH (ref 70–99)
GLUCOSE BLDC GLUCOMTR-MCNC: 239 MG/DL — HIGH (ref 70–99)
GLUCOSE BLDC GLUCOMTR-MCNC: 266 MG/DL — HIGH (ref 70–99)
GLUCOSE BLDC GLUCOMTR-MCNC: 322 MG/DL — HIGH (ref 70–99)

## 2018-07-29 PROCEDURE — 70551 MRI BRAIN STEM W/O DYE: CPT | Mod: 26

## 2018-07-29 PROCEDURE — 93010 ELECTROCARDIOGRAM REPORT: CPT

## 2018-07-29 PROCEDURE — 99232 SBSQ HOSP IP/OBS MODERATE 35: CPT

## 2018-07-29 RX ORDER — INSULIN LISPRO 100/ML
4 VIAL (ML) SUBCUTANEOUS ONCE
Qty: 0 | Refills: 0 | Status: COMPLETED | OUTPATIENT
Start: 2018-07-29 | End: 2018-07-29

## 2018-07-29 RX ADMIN — Medication 25 MILLIGRAM(S): at 17:01

## 2018-07-29 RX ADMIN — AMLODIPINE BESYLATE 10 MILLIGRAM(S): 2.5 TABLET ORAL at 05:10

## 2018-07-29 RX ADMIN — Medication 25 MILLIGRAM(S): at 05:10

## 2018-07-29 RX ADMIN — FAMOTIDINE 20 MILLIGRAM(S): 10 INJECTION INTRAVENOUS at 13:47

## 2018-07-29 RX ADMIN — Medication 0.2 MILLIGRAM(S): at 05:10

## 2018-07-29 RX ADMIN — GABAPENTIN 100 MILLIGRAM(S): 400 CAPSULE ORAL at 21:47

## 2018-07-29 RX ADMIN — Medication 2: at 13:45

## 2018-07-29 RX ADMIN — GABAPENTIN 100 MILLIGRAM(S): 400 CAPSULE ORAL at 13:47

## 2018-07-29 RX ADMIN — Medication 2: at 09:04

## 2018-07-29 RX ADMIN — Medication 0.2 MILLIGRAM(S): at 16:21

## 2018-07-29 RX ADMIN — Medication 1 DROP(S): at 05:13

## 2018-07-29 RX ADMIN — Medication 4 UNIT(S): at 22:41

## 2018-07-29 RX ADMIN — Medication 81 MILLIGRAM(S): at 13:47

## 2018-07-29 RX ADMIN — INSULIN GLARGINE 20 UNIT(S): 100 INJECTION, SOLUTION SUBCUTANEOUS at 10:01

## 2018-07-29 RX ADMIN — INSULIN GLARGINE 10 UNIT(S): 100 INJECTION, SOLUTION SUBCUTANEOUS at 21:47

## 2018-07-29 RX ADMIN — HEPARIN SODIUM 5000 UNIT(S): 5000 INJECTION INTRAVENOUS; SUBCUTANEOUS at 05:10

## 2018-07-29 RX ADMIN — Medication 1 DROP(S): at 17:01

## 2018-07-29 RX ADMIN — HEPARIN SODIUM 5000 UNIT(S): 5000 INJECTION INTRAVENOUS; SUBCUTANEOUS at 17:01

## 2018-07-29 RX ADMIN — GABAPENTIN 100 MILLIGRAM(S): 400 CAPSULE ORAL at 05:13

## 2018-07-29 RX ADMIN — Medication 3: at 17:05

## 2018-07-29 RX ADMIN — LOSARTAN POTASSIUM 50 MILLIGRAM(S): 100 TABLET, FILM COATED ORAL at 05:10

## 2018-07-30 LAB
ANION GAP SERPL CALC-SCNC: 14 MMOL/L — SIGNIFICANT CHANGE UP (ref 5–17)
BASOPHILS # BLD AUTO: 0 K/UL — SIGNIFICANT CHANGE UP (ref 0–0.2)
BASOPHILS NFR BLD AUTO: 0.1 % — SIGNIFICANT CHANGE UP (ref 0–2)
BUN SERPL-MCNC: 28 MG/DL — HIGH (ref 8–20)
CALCIUM SERPL-MCNC: 10 MG/DL — SIGNIFICANT CHANGE UP (ref 8.6–10.2)
CHLORIDE SERPL-SCNC: 101 MMOL/L — SIGNIFICANT CHANGE UP (ref 98–107)
CO2 SERPL-SCNC: 28 MMOL/L — SIGNIFICANT CHANGE UP (ref 22–29)
CREAT SERPL-MCNC: 0.66 MG/DL — SIGNIFICANT CHANGE UP (ref 0.5–1.3)
EOSINOPHIL # BLD AUTO: 0.1 K/UL — SIGNIFICANT CHANGE UP (ref 0–0.5)
EOSINOPHIL NFR BLD AUTO: 1.9 % — SIGNIFICANT CHANGE UP (ref 0–6)
GLUCOSE BLDC GLUCOMTR-MCNC: 134 MG/DL — HIGH (ref 70–99)
GLUCOSE BLDC GLUCOMTR-MCNC: 149 MG/DL — HIGH (ref 70–99)
GLUCOSE BLDC GLUCOMTR-MCNC: 230 MG/DL — HIGH (ref 70–99)
GLUCOSE BLDC GLUCOMTR-MCNC: 306 MG/DL — HIGH (ref 70–99)
GLUCOSE SERPL-MCNC: 217 MG/DL — HIGH (ref 70–115)
HCT VFR BLD CALC: 34.2 % — LOW (ref 37–47)
HGB BLD-MCNC: 11 G/DL — LOW (ref 12–16)
LYMPHOCYTES # BLD AUTO: 2.3 K/UL — SIGNIFICANT CHANGE UP (ref 1–4.8)
LYMPHOCYTES # BLD AUTO: 31.3 % — SIGNIFICANT CHANGE UP (ref 20–55)
MCHC RBC-ENTMCNC: 24.4 PG — LOW (ref 27–31)
MCHC RBC-ENTMCNC: 32.2 G/DL — SIGNIFICANT CHANGE UP (ref 32–36)
MCV RBC AUTO: 75.8 FL — LOW (ref 81–99)
MONOCYTES # BLD AUTO: 0.5 K/UL — SIGNIFICANT CHANGE UP (ref 0–0.8)
MONOCYTES NFR BLD AUTO: 7 % — SIGNIFICANT CHANGE UP (ref 3–10)
NEUTROPHILS # BLD AUTO: 4.4 K/UL — SIGNIFICANT CHANGE UP (ref 1.8–8)
NEUTROPHILS NFR BLD AUTO: 59.4 % — SIGNIFICANT CHANGE UP (ref 37–73)
PLATELET # BLD AUTO: 301 K/UL — SIGNIFICANT CHANGE UP (ref 150–400)
POTASSIUM SERPL-MCNC: 4 MMOL/L — SIGNIFICANT CHANGE UP (ref 3.5–5.3)
POTASSIUM SERPL-SCNC: 4 MMOL/L — SIGNIFICANT CHANGE UP (ref 3.5–5.3)
RBC # BLD: 4.51 M/UL — SIGNIFICANT CHANGE UP (ref 4.4–5.2)
RBC # FLD: 19.9 % — HIGH (ref 11–15.6)
SODIUM SERPL-SCNC: 143 MMOL/L — SIGNIFICANT CHANGE UP (ref 135–145)
WBC # BLD: 7.5 K/UL — SIGNIFICANT CHANGE UP (ref 4.8–10.8)
WBC # FLD AUTO: 7.5 K/UL — SIGNIFICANT CHANGE UP (ref 4.8–10.8)

## 2018-07-30 PROCEDURE — 99223 1ST HOSP IP/OBS HIGH 75: CPT

## 2018-07-30 PROCEDURE — 99222 1ST HOSP IP/OBS MODERATE 55: CPT

## 2018-07-30 RX ORDER — CEFEPIME 1 G/1
1000 INJECTION, POWDER, FOR SOLUTION INTRAMUSCULAR; INTRAVENOUS EVERY 8 HOURS
Qty: 0 | Refills: 0 | Status: DISCONTINUED | OUTPATIENT
Start: 2018-07-30 | End: 2018-07-30

## 2018-07-30 RX ORDER — CEFEPIME 1 G/1
1000 INJECTION, POWDER, FOR SOLUTION INTRAMUSCULAR; INTRAVENOUS ONCE
Qty: 0 | Refills: 0 | Status: DISCONTINUED | OUTPATIENT
Start: 2018-07-30 | End: 2018-07-30

## 2018-07-30 RX ORDER — CEFEPIME 1 G/1
INJECTION, POWDER, FOR SOLUTION INTRAMUSCULAR; INTRAVENOUS
Qty: 0 | Refills: 0 | Status: DISCONTINUED | OUTPATIENT
Start: 2018-07-30 | End: 2018-07-30

## 2018-07-30 RX ADMIN — Medication 2: at 12:52

## 2018-07-30 RX ADMIN — INSULIN GLARGINE 20 UNIT(S): 100 INJECTION, SOLUTION SUBCUTANEOUS at 08:51

## 2018-07-30 RX ADMIN — GABAPENTIN 100 MILLIGRAM(S): 400 CAPSULE ORAL at 12:53

## 2018-07-30 RX ADMIN — Medication 0.2 MILLIGRAM(S): at 05:43

## 2018-07-30 RX ADMIN — FAMOTIDINE 20 MILLIGRAM(S): 10 INJECTION INTRAVENOUS at 12:53

## 2018-07-30 RX ADMIN — Medication 1 DROP(S): at 17:50

## 2018-07-30 RX ADMIN — Medication 0.2 MILLIGRAM(S): at 17:50

## 2018-07-30 RX ADMIN — HEPARIN SODIUM 5000 UNIT(S): 5000 INJECTION INTRAVENOUS; SUBCUTANEOUS at 05:43

## 2018-07-30 RX ADMIN — AMLODIPINE BESYLATE 10 MILLIGRAM(S): 2.5 TABLET ORAL at 05:43

## 2018-07-30 RX ADMIN — GABAPENTIN 100 MILLIGRAM(S): 400 CAPSULE ORAL at 05:43

## 2018-07-30 RX ADMIN — INSULIN GLARGINE 10 UNIT(S): 100 INJECTION, SOLUTION SUBCUTANEOUS at 21:09

## 2018-07-30 RX ADMIN — Medication 650 MILLIGRAM(S): at 21:08

## 2018-07-30 RX ADMIN — Medication 81 MILLIGRAM(S): at 12:53

## 2018-07-30 RX ADMIN — HEPARIN SODIUM 5000 UNIT(S): 5000 INJECTION INTRAVENOUS; SUBCUTANEOUS at 17:50

## 2018-07-30 RX ADMIN — Medication 1 DROP(S): at 05:43

## 2018-07-30 RX ADMIN — GABAPENTIN 100 MILLIGRAM(S): 400 CAPSULE ORAL at 21:09

## 2018-07-30 NOTE — OCCUPATIONAL THERAPY INITIAL EVALUATION ADULT - NS ASR FOLLOW COMMAND OT EVAL
able to follow single-step instructions/pt requires increased time and repetition to follow basic one-step commands; pt with decreased sustained attention, requiring redirection to attend to tasks/questions/50% of the time

## 2018-07-30 NOTE — OCCUPATIONAL THERAPY INITIAL EVALUATION ADULT - LEVEL OF INDEPENDENCE: SUPINE/SIT, REHAB EVAL
semi-sit as pt refusing to come to complete sit to edge of bed despite max encouragement/dependent (less than 25% patients effort)

## 2018-07-30 NOTE — PROGRESS NOTE ADULT - ATTENDING COMMENTS
Patient seen and examined at the bedside. Agree with the above history, physical, assessment, and plan with the necessary amendments/elaborations below:    clinically stable. incidental intracranial aneurysm on imaging study, neurosx eval appreciated, no acute intervention needed, will fu with as outpatient.    abn finding on echo concerning for endocarditis however not clinically apparent. + blood cx documented in pt chart in ERROR. check cx x2. cardio eval appreciated, pending CHRISTOPHER for further investigation.     once these active issues are resolved pt pending eventual transfer to [facility]

## 2018-07-30 NOTE — OCCUPATIONAL THERAPY INITIAL EVALUATION ADULT - PERTINENT HX OF CURRENT PROBLEM, REHAB EVAL
Pt brought in by ambulance with acute onset of right sided facial droop, increased confusion from baseline. Brain CT with moderate chronic microvascular changes without evidence of acute transcortical infarct or hemorrhage. 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

## 2018-07-30 NOTE — OCCUPATIONAL THERAPY INITIAL EVALUATION ADULT - RANGE OF MOTION EXAMINATION, UPPER EXTREMITY
Right UE Passive ROM was WFL  (within functional limits)/Left UE Active ROM was WFL (within functional limits)/right shoulder flexion to about 60 degrees AROM, right elbow flexion to about 90 degrees, right elbow extension lacking about 60 degrees, right gross grasp AROM WFL

## 2018-07-30 NOTE — CONSULT NOTE ADULT - ATTENDING COMMENTS
NSGY Attg:    see above    imaging reviewed    no evidence of ICH/SAH on CT or MRI    incidental right MCA aneursym on MRA    agree with plan as documented

## 2018-07-30 NOTE — PROGRESS NOTE ADULT - SUBJECTIVE AND OBJECTIVE BOX
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  In the ED, Head CT was negative.    INTERVAL HPI/OVERNIGHT EVENTS: Patient seen and examined lying in bed with daughter at bedside providing translation.  Patient denies any headache, dizziness, SOB, CP, abdominal pain, nausea, vomiting, dysuria.  Other ROS reviewed and are negative.    Vital Signs Last 24 Hrs  T(C): 36.6 (30 Jul 2018 07:15), Max: 37.1 (29 Jul 2018 14:51)  T(F): 97.8 (30 Jul 2018 07:15), Max: 98.8 (29 Jul 2018 14:51)  HR: 42 (30 Jul 2018 07:15) (42 - 66)  BP: 116/76 (30 Jul 2018 07:15) (116/76 - 182/80)  BP(mean): --  RR: 18 (30 Jul 2018 07:15) (18 - 18)  SpO2: 100% (30 Jul 2018 07:15) (97% - 100%)  I&O's Detail    29 Jul 2018 07:01  -  30 Jul 2018 07:00  --------------------------------------------------------  IN:    Oral Fluid: 280 mL  Total IN: 280 mL    OUT:  Total OUT: 0 mL    Total NET: 280 mL    PHYSICAL EXAM:  GENERAL: NAD  HEAD:  Atraumatic, Normocephalic  NECK: Supple, No JVD, Normal thyroid  NERVOUS SYSTEM:  Alert & Oriented X3, Good concentration; (+) facial droop, right sided weakness  CHEST/LUNG: Clear to auscultation bilaterally; No rales, rhonchi, wheezing, or rubs  HEART: Regular rate and rhythm; No murmurs, rubs, or gallops  ABDOMEN: Soft, Nontender, Nondistended; Bowel sounds present  EXTREMITIES:  2+ Peripheral Pulses, No clubbing, cyanosis, or edema                            11.0   7.5   )-----------( 301      ( 30 Jul 2018 10:09 )             34.2     30 Jul 2018 10:09    143    |  101    |  28.0   ----------------------------<  217    4.0     |  28.0   |  0.66     Ca    10.0       30 Jul 2018 10:09        CAPILLARY BLOOD GLUCOSE  POCT Blood Glucose.: 134 mg/dL (30 Jul 2018 08:51)  POCT Blood Glucose.: 322 mg/dL (29 Jul 2018 21:38)  POCT Blood Glucose.: 266 mg/dL (29 Jul 2018 17:04)  POCT Blood Glucose.: 239 mg/dL (29 Jul 2018 13:41)        Hemoglobin A1C, Whole Blood: 7.8 % (07-28-18 @ 08:38)    MEDICATIONS  (STANDING):  amLODIPine   Tablet 10 milliGRAM(s) Oral daily  artificial  tears Solution 1 Drop(s) Both EYES two times a day  aspirin enteric coated 81 milliGRAM(s) Oral daily  cloNIDine 0.2 milliGRAM(s) Oral two times a day  dextrose 5%. 1000 milliLiter(s) (50 mL/Hr) IV Continuous <Continuous>  dextrose 50% Injectable 12.5 Gram(s) IV Push once  dextrose 50% Injectable 25 Gram(s) IV Push once  dextrose 50% Injectable 25 Gram(s) IV Push once  famotidine    Tablet 20 milliGRAM(s) Oral daily  gabapentin 100 milliGRAM(s) Oral three times a day  heparin  Injectable 5000 Unit(s) SubCutaneous every 12 hours  insulin glargine Injectable (LANTUS) 10 Unit(s) SubCutaneous at bedtime  insulin glargine Injectable (LANTUS) 20 Unit(s) SubCutaneous every morning  insulin lispro (HumaLOG) corrective regimen sliding scale   SubCutaneous three times a day before meals  losartan 50 milliGRAM(s) Oral daily  metoprolol tartrate 25 milliGRAM(s) Oral two times a day    MEDICATIONS  (PRN):  acetaminophen   Tablet 650 milliGRAM(s) Oral every 6 hours PRN Pain, fever, headache  dextrose 40% Gel 15 Gram(s) Oral once PRN Blood Glucose LESS THAN 70 milliGRAM(s)/deciliter  glucagon  Injectable 1 milliGRAM(s) IntraMuscular once PRN Glucose LESS THAN 70 milligrams/deciliter      RADIOLOGY & ADDITIONAL TESTS:  < from: MR Head No Cont (07.29.18 @ 13:08) >     EXAM:  MR BRAIN                         EXAM:  MR ANGIO BRAIN                          PROCEDURE DATE:  07/29/2018          INTERPRETATION:  Exam Type: MRI BRAIN, MRA COW, MRA NECK WITHOUT CONTRAST   Clinical Indication: CVA. Facial droop.   Comparison: July 27, 2018    Technique: Multiplanar MR imaging of the brain was obtained without   contrast. Time of flight MRA of the [place] were obtained.    Findings:    Diagnostic accuracy is limited secondary to patient motion.    Gliosis adjacent to the occipital horn of the left lateral ventricle   related to sequela of prior hemorrhage. Ventricles and sulci are normal   in size and configuration for patients stated age. No extra axial   collection. No mass or edema. No diffusion evidence of acute infarction.   Cerebellar tonsils are in normal location. Moderate left mastoid   effusion. Mild polypoid mucosal thickening of the maxillary sinuses.    MRA of the [place] demonstrates normal antegrade flow in the   major intracranial arteries. 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 No   flow gap is seen to suggest high grade stenosis.           IMPRESSION:    No evidence for intracranial mass, acute territorial infarct, acute   intracranial hemorrhage, or midline shift.    No hemodynamically significant stenosis in the head or neck.    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                 AVELINO SCOTT M.D., ATTENDING RADIOLOGIST  This document has been electronically signed. Jul 29 2018  1:32PM              < end of copied text >    < from: US Duplex Carotid Arteries Complete, Bilateral (07.28.18 @ 11:42) >     EXAM:  US DPLX CAROTIDS COMPL BI                          PROCEDURE DATE:  07/28/2018          INTERPRETATION:  CLINICAL INFORMATION: Stroke, facial droop.    TECHNIQUE: Carotid duplex examination using grayscale B mode, color flow,   and spectralDoppler.  COMPARISON: None.    Findings:    RIGHT --    Common carotid artery: Intimal thickening. Peak systolic velocity 105   cm/sec.    Internal carotid artery: Mild heterogeneous plaque. Peak systolic   velocity 92 cm/sec. End-diastolic velocity 26 cm/sec.    ICA/CCA ratio: 1.1    External carotid artery: Peak systolic velocity 124 cm/sec.    Vertebral artery: Antegrade flow.    ====================================================================    LEFT --    Common carotid artery: No significant plaque formation. Peak systolic   velocity 123 cm/sec.    Internal carotid artery: Mild heterogeneous plaque. Peak systolic   velocity 128 cm/sec. End-diastolic velocity 21 cm/sec.    ICA/CCA ratio: 2.0    External carotid artery: Peak systolic velocity 87 cm/sec.    Vertebral artery: Antegrade flow.        IMPRESSION:    1.  Right carotid artery: No evidence of hemodynamically significant   stenosis.   2.  Left carotid artery: No evidence of hemodynamically significant   stenosis. Elevated velocities in the left internal carotid artery,   probably due to tortuosity.  3.  Vertebral arteries: Antegrade flow.    ----  BASED ON RADIOLOGY CONSENSUS PANEL GRAYSCALE AND DOPPLER ULTRASOUND   CRITERIA FOR DIAGNOSIS OF ICA STENOSIS GUIDELINES SRURADIOLOGY   2003:[id number].  ---    CAROTID STENOSIS REFERENCE USING SRU CRITERIA:  Mild - <50% stenosis. ICA PSV is less than 125 cm/second and plaque or   intimal thickening is visible.  Moderate - 50-69% stenosis. ICA PSV is 125 to 230 cm/second and plaque is   visible.  Severe - 70-94% stenosis. ICA PSV is more than 230 cm/second and visible   plaque with lumen narrowing is seen.  Near occlusion - 95-99% stenosis. ICA PSV is variable and significant   plaque with luminal narrowing is seen.  Occluded - 100% stenosis. No flow identified.                    MARY DARBY M.D., ATTENDING RADIOLOGIST  This document has been electronically signed. Jul 28 2018 11:59AM              < end of copied text >    < from: TTE Echo Complete w/Doppler (07.28.18 @ 10:58) >  EXAM:  ECHO TRANSTHORACIC COMP W DOPP      PROCEDURE DATE:  Jul 28 2018   .      INTERPRETATION:  REPORT:    TRANSTHORACIC ECHOCARDIOGRAM REPORT         Patient Name:   ZION LEACH Patient Location: Emergency  Medical Rec #:  [id number]        Accession #:      [id number]  Account #:                      Height:           61.8 in 157.0 cm  YOB: 1934       Weight:           114.6 lb 52.00 kg  Patient Age:    83 years        BSA:              1.51 m²  Patient Gender: F  BP:               139/66 mmHg       Date of Exam: 7/28/2018 10:58:01 AM  Sonographer:  Jackson Harper Jr    Procedure:     2D Echo/Doppler/Color Doppler Complete.  Indications:   Cerebral infarction, unspecified - I63.9  Diagnosis:     Cerebral infarction, unspecified - I63.9  Study Details: Technically good study.         2D AND M-MODE MEASUREMENTS (normal ranges within parentheses):  Left                Normal    Aorta/Left           Normal  Ventricle:                    Atrium:  IVSd (2D): 1.14  (0.7-1.1) Aortic Root  2.70 cm (2.4-3.7)                cm              (2D):  LVPWd (2D):   0.98  (0.7-1.1) Left Atrium  3.08 cm (1.9-4.0)                cm              (2D):  LVIDd (2D):   3.78  (3.4-5.7) LA Volume    25.0                cm             Index        ml/m²  LVIDs (2D):   2.58            Right Ventricle:                cm              TAPSE:           1.84 cm  LV FS (2D):   31.7  (>25%)                %  Relative Wall 0.52  (<0.42)  Thickness    LV DIASTOLIC FUNCTION:  MVPeak E: 0.63 m/s E/e' Ratio: 13.70  MV Peak A: 0.97 m/s Decel Time: 215 msec  E/A Ratio: 0.65    SPECTRAL DOPPLER ANALYSIS (where applicable):  Mitral Valve:  MV P1/2 Time: 62.35 msec  MV Area, PHT: 3.53 cm²    LVOT Vmax:  LVOT VTI:  LVOT Diameter: 1.79 cm    Tricuspid Valve and PA/RV Systolic Pressure: TR Max Velocity: 2.00 m/s RA   Pressure: 3 mmHg RVSP/PASP: 19.0 mmHg       PHYSICIAN INTERPRETATION:  Left Ventricle: Normal left ventricular size and wall thicknesses, with   normal systolic function.  Global LV systolic function was hyperdynamic. Left ventricular ejection   fraction, by visual estimation, is >75%. Spectral Doppler shows impaired   relaxation pattern of left ventricular myocardial filling (Grade I   diastolic dysfunction). Normal LV filling pressures.  Right Ventricle: Normal right ventricular size and function. TV S' 0.1   m/s.  Left Atrium: The left atrium is normal in size.  Right Atrium: The right atrium is normal in size.  Pericardium: There is no evidence of pericardial effusion.  Mitral Valve: Thickening of the anterior and posterior mitral valve   leaflets. No evidence of mitral valve regurgitation is seen.  Tricuspid Valve: Trivial tricuspid regurgitation is visualized.  Aortic Valve: Sclerotic aortic valve with normal opening. Trivial aortic   valve regurgitation is seen.  Pulmonic Valve: The pulmonic valve was not well visualized. No indication   of pulmonic valve regurgitation.  Aorta: The aortic root is normal in size and structure.  Pulmonary Artery:The pulmonary artery is not well seen.  Venous: The inferior vena cava was normal sized, with respiratory size   variation greater than 50%.       Summary:   1. Technically good study.   2. Hyperdynamic global left ventricular systolic function.   3.Left ventricular ejection fraction, by visual estimation, is >75%.   4. Spectral Doppler shows impaired relaxation pattern of left   ventricular myocardial filling (Grade I diastolic dysfunction).   5. There is mild concentric left ventricular hypertrophy.   6. Thickening of the anterior and posterior mitral valve leaflets.   7. Trace tricuspid regurgitation.   8. Sclerotic aortic valve with normal opening. Strand like mobile Echo   densities attached to the tips of aortic valve leaflets. Recommend CHRISTOPHER   for further evaluation.    [id number] Willi Camacho MD, Electronically signed on 7/28/2018 at 4:30:51 PM              *** Final ***                  WILLI CAMACHO   This document has been electronically signed. Jul 28 2018 10:58AM          < end of copied text >

## 2018-07-30 NOTE — OCCUPATIONAL THERAPY INITIAL EVALUATION ADULT - MANUAL MUSCLE TESTING RESULTS, REHAB EVAL
pt's decreased ability to follow commands of assessment; left shoulder with AROM against gravity 3/5, left elbow with AROM against gravity 3/5, left gross grasp 4/5; right shoulder with partial AROM against gravity 2/5, right elbow with AROM against gravity 2/5, right gross grasp 3/5/grossly assessed due to

## 2018-07-30 NOTE — CONSULT NOTE ADULT - SUBJECTIVE AND OBJECTIVE BOX
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 PT DENIES FEVERS OR WT LOSS   ECHO WITH ? VEG ASKED TP EVAL FROM ID STANDPOINT        PAST MEDICAL & SURGICAL HISTORY:  GERD (gastroesophageal reflux disease)  Diabetes mellitus  High cholesterol  Hypertension  No significant past surgical history      ANTIBIOTICS      Allergies    No Known Allergies    Intolerances        SOCIAL HISTORY:       FAMILY HX   FAMILY HISTORY:  No pertinent family history in first degree relatives      Vital Signs Last 24 Hrs  T(C): 36.6 (30 Jul 2018 07:15), Max: 37.1 (29 Jul 2018 14:51)  T(F): 97.8 (30 Jul 2018 07:15), Max: 98.8 (29 Jul 2018 14:51)  HR: 42 (30 Jul 2018 07:15) (42 - 66)  BP: 116/76 (30 Jul 2018 07:15) (116/76 - 182/80)  BP(mean): --  RR: 18 (30 Jul 2018 07:15) (18 - 18)  SpO2: 100% (30 Jul 2018 07:15) (97% - 100%)  Drug Dosing Weight  Height (cm): 157.48 (05 Dec 2017 20:43)  Weight (kg): 56.1 (28 Jul 2018 02:38)  BMI (kg/m2): 22.6 (28 Jul 2018 02:38)  BSA (m2): 1.56 (28 Jul 2018 02:38)      REVIEW OF SYSTEMS:    CONSTITUTIONAL:  As per HPI.    HEENT:  Eyes:  No diplopia or blurred vision. ENT:  No earache, sore throat or runny nose.    CARDIOVASCULAR:  No pressure, squeezing, strangling, tightness, heaviness or aching about the chest, neck, axilla or epigastrium.    RESPIRATORY:  No cough, shortness of breath, PND or orthopnea.    GASTROINTESTINAL:  No nausea, vomiting or diarrhea.    GENITOURINARY:  No dysuria, frequency or urgency.    MUSCULOSKELETAL:  As per HPI.    SKIN:  No change in skin, hair or nails.    NEUROLOGIC:  AS PER HPI                PHYSICAL EXAMINATION:    GENERAL: The patient is a well-developed, well-nourished INNAD     VITAL SIGNS: T(C): 36.6 (07-30-18 @ 07:15), Max: 37.1 (07-29-18 @ 14:51)  HR: 42 (07-30-18 @ 07:15) (42 - 66)  BP: 116/76 (07-30-18 @ 07:15) (116/76 - 182/80)  RR: 18 (07-30-18 @ 07:15) (18 - 18)  SpO2: 100% (07-30-18 @ 07:15) (97% - 100%)  Wt(kg): --    HEENT: Head is normocephalic and atraumatic.  ANICTERIC  BELLS PALSY R  NECK: Supple. No carotid bruits.  No lymphadenopathy or thyromegaly.    LUNGS :COARSE BREATH SOUNDS    HEART: Regular rate and rhythm without murmur.    ABDOMEN: Soft, nontender, and nondistended.  Positive bowel sounds.  No hepatosplenomegaly was noted. NO REBOUND NO GUARDING    EXTREMITIES: NO EDEMA NO ERYTHEMA    NEUROLOGIC RIGHT BEELS RIGHT UPPER EXT WEAKNESS      SKIN: No ulceration or induration present. 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 VEG BLOOD CX ARE NEGATIVE  PT DOES NOT HAVE GM NEG RODS   THIS REPORT WAS NAN ERROR IN CHART   DEFER ABX FOR NWO  AWAIT BLOOD CX RESULTS  SUGGEST CHRISTOPHER                JOSEPH ARREAGA MD

## 2018-07-30 NOTE — CONSULT NOTE ADULT - SUBJECTIVE AND OBJECTIVE BOX
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 Patient is a poor historian, most info obtained from chart. On July 27th she appeared to have more pronounced facial asymmetry and confusion. Patient was transferred to ED to be evaluated, and initial CT was negative. Pt was admitted for stroke/TIA evaluation. 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 Patient also had TTE which showed hyperdynamic LVEF 75%, grade I diastolic dysfunction, trace TR, and strand like mobile echo densities attached to tips of the aortic valve leaflets, sclerotic aortic valve with normal opening. Patient had a positive blood cx with GNR, but turned out to be entered in error. Consulted at this time to evaluate for CHRISTOPHER. Patient on a puree diet with thin liquids.       PAST MEDICAL & SURGICAL HISTORY:  Left Basal Ganglia Hemorrhage with residual right weakness  GERD (gastroesophageal reflux disease)  Diabetes mellitus  High cholesterol  Hypertension  No significant past surgical history      PREVIOUS DIAGNOSTIC TESTING:      ECHO  FINDINGS:< from: TTE Echo Complete w/Doppler (07.28.18 @ 10:58) >  PHYSICIAN INTERPRETATION:  Left Ventricle: Normal left ventricular size and wall thicknesses, with   normal systolic function.  Global LV systolic function was hyperdynamic. Left ventricular ejection   fraction, by visual estimation, is >75%. Spectral Doppler shows impaired   relaxation pattern of left ventricular myocardial filling (Grade I   diastolic dysfunction). Normal LV filling pressures.  Right Ventricle: Normal right ventricular size and function. TV S' 0.1   m/s.  Left Atrium: The left atrium is normal in size.  Right Atrium: The right atrium is normal in size.  Pericardium: There is no evidence of pericardial effusion.  Mitral Valve: Thickening of the anterior and posterior mitral valve   leaflets. No evidence of mitral valve regurgitation is seen.  Tricuspid Valve: Trivial tricuspid regurgitation is visualized.  Aortic Valve: Sclerotic aortic valve with normal opening. Trivial aortic   valve regurgitation is seen.  Pulmonic Valve: The pulmonic valve was not well visualized. No indication   of pulmonic valve regurgitation.  Aorta: The aortic root is normal in size and structure.  Pulmonary Artery:The pulmonary artery is not well seen.  Venous: The inferior vena cava was normal sized, with respiratory size   variation greater than 50%.       Summary:   1. Technically good study.   2. Hyperdynamic global left ventricular systolic function.   3.Left ventricular ejection fraction, by visual estimation, is >75%.   4. Spectral Doppler shows impaired relaxation pattern of left   ventricular myocardial filling (Grade I diastolic dysfunction).   5. There is mild concentric left ventricular hypertrophy.   6. Thickening of the anterior and posterior mitral valve leaflets.   7. Trace tricuspid regurgitation.   8. Sclerotic aortic valve with normal opening. Strand like mobile Echo   densities attached to the tips of aortic valve leaflets. Recommend CHRISTOPHER   for further evaluation.    [id number] Anup Nj MD, Electronically signed on 7/28/2018 at 4:30:51 PM       < end of copied text >          CATHETERIZATION  FINDINGS:      Allergies    No Known Allergies    Intolerances        MEDICATIONS  (STANDING):  amLODIPine   Tablet 10 milliGRAM(s) Oral daily  artificial  tears Solution 1 Drop(s) Both EYES two times a day  aspirin enteric coated 81 milliGRAM(s) Oral daily  cloNIDine 0.2 milliGRAM(s) Oral two times a day  dextrose 5%. 1000 milliLiter(s) (50 mL/Hr) IV Continuous <Continuous>  dextrose 50% Injectable 12.5 Gram(s) IV Push once  dextrose 50% Injectable 25 Gram(s) IV Push once  dextrose 50% Injectable 25 Gram(s) IV Push once  famotidine    Tablet 20 milliGRAM(s) Oral daily  gabapentin 100 milliGRAM(s) Oral three times a day  heparin  Injectable 5000 Unit(s) SubCutaneous every 12 hours  insulin glargine Injectable (LANTUS) 10 Unit(s) SubCutaneous at bedtime  insulin glargine Injectable (LANTUS) 20 Unit(s) SubCutaneous every morning  insulin lispro (HumaLOG) corrective regimen sliding scale   SubCutaneous three times a day before meals  losartan 50 milliGRAM(s) Oral daily  metoprolol tartrate 25 milliGRAM(s) Oral two times a day    MEDICATIONS  (PRN):  acetaminophen   Tablet 650 milliGRAM(s) Oral every 6 hours PRN Pain, fever, headache  dextrose 40% Gel 15 Gram(s) Oral once PRN Blood Glucose LESS THAN 70 milliGRAM(s)/deciliter  glucagon  Injectable 1 milliGRAM(s) IntraMuscular once PRN Glucose LESS THAN 70 milligrams/deciliter      FAMILY HISTORY:  No pertinent family history in first degree relatives      SOCIAL HISTORY:    CIGARETTES:    ALCOHOL:    REVIEW OF SYSTEMS:  CONSTITUTIONAL: No fever, weight loss, or fatigue  EYES: No eye pain, visual disturbances, or discharge  ENMT:  No difficulty hearing, tinnitus, vertigo; No sinus or throat pain  NECK: No pain or stiffness  RESPIRATORY: No cough, wheezing, chills or hemoptysis; No Shortness of Breath  CARDIOVASCULAR: No chest pain, palpitations, passing out, dizziness, or leg swelling  GASTROINTESTINAL: No abdominal or epigastric pain. No nausea, vomiting, or hematemesis; No diarrhea or constipation. No melena or hematochezia.  GENITOURINARY: No dysuria, frequency, hematuria, or incontinence  NEUROLOGICAL: No headaches, memory loss, loss of strength, numbness, or tremors  SKIN: No itching, burning, rashes, or lesions   LYMPH Nodes: No enlarged glands  ENDOCRINE: No heat or cold intolerance; No hair loss  MUSCULOSKELETAL: No joint pain or swelling; No muscle, back, or extremity pain  PSYCHIATRIC: No depression, anxiety, mood swings, or difficulty sleeping  HEME/LYMPH: No easy bruising, or bleeding gums  ALLERY AND IMMUNOLOGIC: No hives or eczema	    Vital Signs Last 24 Hrs  T(C): 36.6 (30 Jul 2018 07:15), Max: 36.9 (29 Jul 2018 16:13)  T(F): 97.8 (30 Jul 2018 07:15), Max: 98.5 (29 Jul 2018 16:13)  HR: 42 (30 Jul 2018 07:15) (42 - 61)  BP: 116/76 (30 Jul 2018 07:15) (116/76 - 182/80)  BP(mean): --  RR: 18 (30 Jul 2018 07:15) (18 - 18)  SpO2: 100% (30 Jul 2018 07:15) (97% - 100%)    Daily     Daily     I&O's Detail    29 Jul 2018 07:01  -  30 Jul 2018 07:00  --------------------------------------------------------  IN:    Oral Fluid: 280 mL  Total IN: 280 mL    OUT:  Total OUT: 0 mL    Total NET: 280 mL          PHYSICAL EXAM:  Appearance: Normal, well nourished	  HEENT:   Normal oral mucosa, PERRL, EOMI, sclera non-icteric	  Lymphatic: No cervical lymphadenopathy  Cardiovascular: Normal S1 S2, No JVD, No cardiac murmurs, No carotid bruits, No peripheral edema  Respiratory: Lungs clear to auscultation	  Psychiatry: A & O x 3, Mood & affect appropriate  Gastrointestinal:  Soft, Non-tender, + BS, no bruits	  Skin: No rashes, No ecchymoses, No cyanosis  Neurologic: Grossly non-focal with full strength in all four extremities  Extremities: Normal range of motion, No clubbing, cyanosis or edema  Vascular: Peripheral pulses palpable 2+ bilaterally      INTERPRETATION OF TELEMETRY:    ECG:    LABS:                        11.0   7.5   )-----------( 301      ( 30 Jul 2018 10:09 )             34.2     07-30    143  |  101  |  28.0<H>  ----------------------------<  217<H>  4.0   |  28.0  |  0.66    Ca    10.0      30 Jul 2018 10:09              I&O's Summary    29 Jul 2018 07:01  -  30 Jul 2018 07:00  --------------------------------------------------------  IN: 280 mL / OUT: 0 mL / NET: 280 mL      BNP    RADIOLOGY & ADDITIONAL STUDIES: Patient is a 83y old  Female who presents with a chief complaint of Facial droop (27 Jul 2018 16:22)      HPI: Patient is a 82 y/o Female with a PMHx of HTN, HLD, 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 Patient is a poor historian, most info obtained from chart. On July 27th she appeared to have more pronounced facial asymmetry and confusion. Patient was transferred to ED to be evaluated, and initial CT was negative. Pt was admitted for stroke/TIA evaluation. 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 Patient also had TTE which showed hyperdynamic LVEF 75%, grade I diastolic dysfunction, trace TR, and strand like mobile echo densities attached to tips of the aortic valve leaflets, sclerotic aortic valve with normal opening. Patient had a positive blood cx with GNR, but turned out to be entered in error. Consulted at this time to evaluate for CHRISTOPHER. Patient on a puree diet with thin liquids. Patient states right now that she is feeling better than she first got here. Never had any chest pain or SOB. Patient denies fevers, chills, N/v/D, headache, or dizziness.       PAST MEDICAL & SURGICAL HISTORY:  Left Basal Ganglia Hemorrhage with residual right weakness  GERD (gastroesophageal reflux disease)  Diabetes mellitus  High cholesterol  Hypertension  No significant past surgical history      PREVIOUS DIAGNOSTIC TESTING:      ECHO  FINDINGS:< from: TTE Echo Complete w/Doppler (07.28.18 @ 10:58) >  PHYSICIAN INTERPRETATION:  Left Ventricle: Normal left ventricular size and wall thicknesses, with   normal systolic function.  Global LV systolic function was hyperdynamic. Left ventricular ejection   fraction, by visual estimation, is >75%. Spectral Doppler shows impaired   relaxation pattern of left ventricular myocardial filling (Grade I   diastolic dysfunction). Normal LV filling pressures.  Right Ventricle: Normal right ventricular size and function. TV S' 0.1   m/s.  Left Atrium: The left atrium is normal in size.  Right Atrium: The right atrium is normal in size.  Pericardium: There is no evidence of pericardial effusion.  Mitral Valve: Thickening of the anterior and posterior mitral valve   leaflets. No evidence of mitral valve regurgitation is seen.  Tricuspid Valve: Trivial tricuspid regurgitation is visualized.  Aortic Valve: Sclerotic aortic valve with normal opening. Trivial aortic   valve regurgitation is seen.  Pulmonic Valve: The pulmonic valve was not well visualized. No indication   of pulmonic valve regurgitation.  Aorta: The aortic root is normal in size and structure.  Pulmonary Artery:The pulmonary artery is not well seen.  Venous: The inferior vena cava was normal sized, with respiratory size   variation greater than 50%.       Summary:   1. Technically good study.   2. Hyperdynamic global left ventricular systolic function.   3.Left ventricular ejection fraction, by visual estimation, is >75%.   4. Spectral Doppler shows impaired relaxation pattern of left   ventricular myocardial filling (Grade I diastolic dysfunction).   5. There is mild concentric left ventricular hypertrophy.   6. Thickening of the anterior and posterior mitral valve leaflets.   7. Trace tricuspid regurgitation.   8. Sclerotic aortic valve with normal opening. Strand like mobile Echo   densities attached to the tips of aortic valve leaflets. Recommend CHRISTOPHER   for further evaluation.    [id number] Anup Nj MD, Electronically signed on 7/28/2018 at 4:30:51 PM       < end of copied text >          CATHETERIZATION  FINDINGS:      Allergies    No Known Allergies    Intolerances        MEDICATIONS  (STANDING):  amLODIPine   Tablet 10 milliGRAM(s) Oral daily  artificial  tears Solution 1 Drop(s) Both EYES two times a day  aspirin enteric coated 81 milliGRAM(s) Oral daily  cloNIDine 0.2 milliGRAM(s) Oral two times a day  dextrose 5%. 1000 milliLiter(s) (50 mL/Hr) IV Continuous <Continuous>  dextrose 50% Injectable 12.5 Gram(s) IV Push once  dextrose 50% Injectable 25 Gram(s) IV Push once  dextrose 50% Injectable 25 Gram(s) IV Push once  famotidine    Tablet 20 milliGRAM(s) Oral daily  gabapentin 100 milliGRAM(s) Oral three times a day  heparin  Injectable 5000 Unit(s) SubCutaneous every 12 hours  insulin glargine Injectable (LANTUS) 10 Unit(s) SubCutaneous at bedtime  insulin glargine Injectable (LANTUS) 20 Unit(s) SubCutaneous every morning  insulin lispro (HumaLOG) corrective regimen sliding scale   SubCutaneous three times a day before meals  losartan 50 milliGRAM(s) Oral daily  metoprolol tartrate 25 milliGRAM(s) Oral two times a day    MEDICATIONS  (PRN):  acetaminophen   Tablet 650 milliGRAM(s) Oral every 6 hours PRN Pain, fever, headache  dextrose 40% Gel 15 Gram(s) Oral once PRN Blood Glucose LESS THAN 70 milliGRAM(s)/deciliter  glucagon  Injectable 1 milliGRAM(s) IntraMuscular once PRN Glucose LESS THAN 70 milligrams/deciliter      FAMILY HISTORY:  No pertinent family history in first degree relatives      SOCIAL HISTORY:    CIGARETTES:    ALCOHOL:    REVIEW OF SYSTEMS:  CONSTITUTIONAL: No fever, weight loss, or fatigue  EYES: No eye pain, visual disturbances, or discharge  ENMT:  No difficulty hearing, tinnitus, vertigo; No sinus or throat pain  NECK: No pain or stiffness  RESPIRATORY: No cough, wheezing, chills or hemoptysis; No Shortness of Breath  CARDIOVASCULAR: No chest pain, palpitations, passing out, dizziness, or leg swelling  GASTROINTESTINAL: No abdominal or epigastric pain. No nausea, vomiting, or hematemesis; No diarrhea or constipation. No melena or hematochezia.  GENITOURINARY: No dysuria, frequency, hematuria, or incontinence  NEUROLOGICAL: No headaches, memory loss, loss of strength, numbness, or tremors  SKIN: No itching, burning, rashes, or lesions   LYMPH Nodes: No enlarged glands  ENDOCRINE: No heat or cold intolerance; No hair loss  MUSCULOSKELETAL: No joint pain or swelling; No muscle, back, or extremity pain  PSYCHIATRIC: No depression, anxiety, mood swings, or difficulty sleeping  HEME/LYMPH: No easy bruising, or bleeding gums  ALLERY AND IMMUNOLOGIC: No hives or eczema	    Vital Signs Last 24 Hrs  T(C): 36.6 (30 Jul 2018 07:15), Max: 36.9 (29 Jul 2018 16:13)  T(F): 97.8 (30 Jul 2018 07:15), Max: 98.5 (29 Jul 2018 16:13)  HR: 42 (30 Jul 2018 07:15) (42 - 61)  BP: 116/76 (30 Jul 2018 07:15) (116/76 - 182/80)  BP(mean): --  RR: 18 (30 Jul 2018 07:15) (18 - 18)  SpO2: 100% (30 Jul 2018 07:15) (97% - 100%)    Daily     Daily     I&O's Detail    29 Jul 2018 07:01  -  30 Jul 2018 07:00  --------------------------------------------------------  IN:    Oral Fluid: 280 mL  Total IN: 280 mL    OUT:  Total OUT: 0 mL    Total NET: 280 mL          PHYSICAL EXAM:  Appearance: Normal, well nourished	  HEENT:   Normal oral mucosa, PERRL, EOMI, sclera non-icteric	  Lymphatic: No cervical lymphadenopathy  Cardiovascular: Normal S1 S2, No JVD, No cardiac murmurs, No carotid bruits, No peripheral edema  Respiratory: Lungs clear to auscultation	  Psychiatry: A & O x 3, Mood & affect appropriate  Gastrointestinal:  Soft, Non-tender, + BS, no bruits	  Skin: No rashes, No ecchymoses, No cyanosis  Neurologic: Grossly non-focal with full strength in all four extremities  Extremities: Normal range of motion, No clubbing, cyanosis or edema  Vascular: Peripheral pulses palpable 2+ bilaterally      INTERPRETATION OF TELEMETRY:    ECG:    LABS:                        11.0   7.5   )-----------( 301      ( 30 Jul 2018 10:09 )             34.2     07-30    143  |  101  |  28.0<H>  ----------------------------<  217<H>  4.0   |  28.0  |  0.66    Ca    10.0      30 Jul 2018 10:09              I&O's Summary    29 Jul 2018 07:01  -  30 Jul 2018 07:00  --------------------------------------------------------  IN: 280 mL / OUT: 0 mL / NET: 280 mL      BNP    RADIOLOGY & ADDITIONAL STUDIES: Patient is a 83y old  Female who presents with a chief complaint of Facial droop (27 Jul 2018 16:22)      HPI: Patient is a 84 y/o Female with a PMHx of HTN, HLD, 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 Patient is a poor historian, most info obtained from chart. On July 27th she appeared to have more pronounced facial asymmetry and confusion. Patient was transferred to ED to be evaluated, and initial CT was negative. Pt was admitted for stroke/TIA evaluation. 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 Patient also had TTE which showed hyperdynamic LVEF 75%, grade I diastolic dysfunction, trace TR, and strand like mobile echo densities attached to tips of the aortic valve leaflets, sclerotic aortic valve with normal opening. Patient had a positive blood cx with GNR, but turned out to be entered in error. Consulted at this time to evaluate for CHRISTOPHER. Patient on a puree diet with thin liquids. Patient states right now that she is feeling better than she first got here. Never had any chest pain or SOB. Patient denies fevers, chills, N/v/D, headache, or dizziness.       PAST MEDICAL & SURGICAL HISTORY:  Left Basal Ganglia Hemorrhage with residual right weakness  GERD (gastroesophageal reflux disease)  Diabetes mellitus  High cholesterol  Hypertension  No significant past surgical history      PREVIOUS DIAGNOSTIC TESTING:      ECHO  FINDINGS:< from: TTE Echo Complete w/Doppler (07.28.18 @ 10:58) >  PHYSICIAN INTERPRETATION:  Left Ventricle: Normal left ventricular size and wall thicknesses, with   normal systolic function.  Global LV systolic function was hyperdynamic. Left ventricular ejection   fraction, by visual estimation, is >75%. Spectral Doppler shows impaired   relaxation pattern of left ventricular myocardial filling (Grade I   diastolic dysfunction). Normal LV filling pressures.  Right Ventricle: Normal right ventricular size and function. TV S' 0.1   m/s.  Left Atrium: The left atrium is normal in size.  Right Atrium: The right atrium is normal in size.  Pericardium: There is no evidence of pericardial effusion.  Mitral Valve: Thickening of the anterior and posterior mitral valve   leaflets. No evidence of mitral valve regurgitation is seen.  Tricuspid Valve: Trivial tricuspid regurgitation is visualized.  Aortic Valve: Sclerotic aortic valve with normal opening. Trivial aortic   valve regurgitation is seen.  Pulmonic Valve: The pulmonic valve was not well visualized. No indication   of pulmonic valve regurgitation.  Aorta: The aortic root is normal in size and structure.  Pulmonary Artery:The pulmonary artery is not well seen.  Venous: The inferior vena cava was normal sized, with respiratory size   variation greater than 50%.       Summary:   1. Technically good study.   2. Hyperdynamic global left ventricular systolic function.   3.Left ventricular ejection fraction, by visual estimation, is >75%.   4. Spectral Doppler shows impaired relaxation pattern of left   ventricular myocardial filling (Grade I diastolic dysfunction).   5. There is mild concentric left ventricular hypertrophy.   6. Thickening of the anterior and posterior mitral valve leaflets.   7. Trace tricuspid regurgitation.   8. Sclerotic aortic valve with normal opening. Strand like mobile Echo   densities attached to the tips of aortic valve leaflets. Recommend CHRISTOPHER   for further evaluation.    [id number] Anup Nj MD, Electronically signed on 7/28/2018 at 4:30:51 PM       < end of copied text >          CATHETERIZATION  FINDINGS:      Allergies    No Known Allergies    Intolerances        MEDICATIONS  (STANDING):  amLODIPine   Tablet 10 milliGRAM(s) Oral daily  artificial  tears Solution 1 Drop(s) Both EYES two times a day  aspirin enteric coated 81 milliGRAM(s) Oral daily  cloNIDine 0.2 milliGRAM(s) Oral two times a day  dextrose 5%. 1000 milliLiter(s) (50 mL/Hr) IV Continuous <Continuous>  dextrose 50% Injectable 12.5 Gram(s) IV Push once  dextrose 50% Injectable 25 Gram(s) IV Push once  dextrose 50% Injectable 25 Gram(s) IV Push once  famotidine    Tablet 20 milliGRAM(s) Oral daily  gabapentin 100 milliGRAM(s) Oral three times a day  heparin  Injectable 5000 Unit(s) SubCutaneous every 12 hours  insulin glargine Injectable (LANTUS) 10 Unit(s) SubCutaneous at bedtime  insulin glargine Injectable (LANTUS) 20 Unit(s) SubCutaneous every morning  insulin lispro (HumaLOG) corrective regimen sliding scale   SubCutaneous three times a day before meals  losartan 50 milliGRAM(s) Oral daily  metoprolol tartrate 25 milliGRAM(s) Oral two times a day    MEDICATIONS  (PRN):  acetaminophen   Tablet 650 milliGRAM(s) Oral every 6 hours PRN Pain, fever, headache  dextrose 40% Gel 15 Gram(s) Oral once PRN Blood Glucose LESS THAN 70 milliGRAM(s)/deciliter  glucagon  Injectable 1 milliGRAM(s) IntraMuscular once PRN Glucose LESS THAN 70 milligrams/deciliter      FAMILY HISTORY:  No pertinent family history in first degree relatives      SOCIAL HISTORY:  CIGARETTES: Denies  ALCOHOL: Denies    REVIEW OF SYSTEMS:  CONSTITUTIONAL: No fever, weight loss, or fatigue  EYES: No eye pain, visual disturbances, or discharge  ENMT:  No difficulty hearing, tinnitus, vertigo; No sinus or throat pain  NECK: No pain or stiffness  RESPIRATORY: No cough, wheezing, chills or hemoptysis; No Shortness of Breath  CARDIOVASCULAR: No chest pain, palpitations, passing out, dizziness, or leg swelling  GASTROINTESTINAL: No abdominal or epigastric pain. No nausea, vomiting, or hematemesis; No diarrhea or constipation. No melena or hematochezia.  GENITOURINARY: No dysuria, frequency, hematuria, or incontinence  NEUROLOGICAL: AS PER HPI  SKIN: No itching, burning, rashes, or lesions       Vital Signs Last 24 Hrs  T(C): 36.6 (30 Jul 2018 07:15), Max: 36.9 (29 Jul 2018 16:13)  T(F): 97.8 (30 Jul 2018 07:15), Max: 98.5 (29 Jul 2018 16:13)  HR: 42 (30 Jul 2018 07:15) (42 - 61)  BP: 116/76 (30 Jul 2018 07:15) (116/76 - 182/80)  RR: 18 (30 Jul 2018 07:15) (18 - 18)  SpO2: 100% (30 Jul 2018 07:15) (97% - 100%)    Daily     Daily     I&O's Detail    29 Jul 2018 07:01  -  30 Jul 2018 07:00  --------------------------------------------------------  IN:    Oral Fluid: 280 mL  Total IN: 280 mL    OUT:  Total OUT: 0 mL    Total NET: 280 mL    PHYSICAL EXAM:  Appearance: Normal, well nourished	  HEENT:   Normal oral mucosa, PERRL, EOMI, sclera non-icteric	  Lymphatic: No cervical lymphadenopathy  Cardiovascular: Normal S1 S2, No JVD, no significant murmurs, no JVD, no peripheral edema  Respiratory: Lungs clear to auscultation	  Psychiatry: A & O x 2, to person and hospital. Mood & affect appropriate  Gastrointestinal:  Soft, Non-tender, + BS, no bruits	  Skin: No rashes, No ecchymoses, No cyanosis  Neurologic: Right sided facial droop, decreased strength RUE and RLE   Extremities:No clubbing, cyanosis or edema        INTERPRETATION OF TELEMETRY: SR/SB    ECG: SB, LAD, p wave inversion in II, II aVF, V3-V6, poor R wave progression    LABS:                        11.0   7.5   )-----------( 301      ( 30 Jul 2018 10:09 )             34.2     07-30    143  |  101  |  28.0<H>  ----------------------------<  217<H>  4.0   |  28.0  |  0.66    Ca    10.0      30 Jul 2018 10:09              I&O's Summary    29 Jul 2018 07:01  -  30 Jul 2018 07:00  --------------------------------------------------------  IN: 280 mL / OUT: 0 mL / NET: 280 mL      BNP    RADIOLOGY & ADDITIONAL STUDIES:< from: CT Brain Stroke Protocol (07.27.18 @ 12:15) >  XAM:  CT BRAIN STROKE PROTOCOL                          PROCEDURE DATE:  07/27/2018          INTERPRETATION:  CLINICAL HISTORY: Facial droop, confusion    COMPARISON: CT head dated 12/5/2017    TECHNIQUE: Noncontrast CT of the head. Multiplanar reformations are   submitted.    FINDINGS: Old small right basal ganglia lacunar infarct.  There is periventricular and subcortical white matter hypodensity without   mass effect, nonspecific, likely representing moderate chronic   microvascular ischemic changes. There is no compelling evidence for an   acute transcortical infarction. There is no evidence of mass, mass   effect, midline shift or extra-axial fluid collection. The lateral   ventricles and cortical sulci are age-appropriate in size and   configuration. Moderate left-sided mastoid air cell effusion The orbits,   right mastoid air cells and visualized paranasal sinuses are otherwise   unremarkable. The calvarium is intact.    IMPRESSION:  Moderate chronic microvascular changes without evidence of   an acute transcortical infarction or hemorrhage. MR is a more sensitive   imaging modality for the evaluation of an acute infarction. Findings   discussed with Dr. Rg at 12:15 PM.        < end of copied text >    < from: MR Head No Cont (07.29.18 @ 13:08) >  EXAM:  MR BRAIN                         EXAM:  MR ANGIO BRAIN                          PROCEDURE DATE:  07/29/2018          INTERPRETATION:  Exam Type: MRI BRAIN, MRA COW, MRA NECK WITHOUT CONTRAST   Clinical Indication: CVA. Facial droop.   Comparison: July 27, 2018    Technique: Multiplanar MR imaging of the brain was obtained without   contrast. Time of flight MRA of the [place] were obtained.    Findings:    Diagnostic accuracy is limited secondary to patient motion.    Gliosis adjacent to the occipital horn of the left lateral ventricle   related to sequela of prior hemorrhage. Ventricles and sulci are normal   in size and configuration for patients stated age. No extra axial   collection. No mass or edema. No diffusion evidence of acute infarction.   Cerebellar tonsils are in normal location. Moderate left mastoid   effusion. Mild polypoid mucosal thickening of the maxillary sinuses.    MRA of the [place] demonstrates normal antegrade flow in the   major intracranial arteries. 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 No   flow gap is seen to suggest high grade stenosis.           IMPRESSION:    No evidence for intracranial mass, acute territorial infarct, acute   intracranial hemorrhage, or midline shift.    No hemodynamically significant stenosis in the head or neck.    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                 AVELINO SCOTT M.D., ATTENDING RADIOLOGIST  This document has been electronically signed. Jul 29 2018  1:32PM    < end of copied text >

## 2018-07-30 NOTE — PROGRESS NOTE ADULT - SUBJECTIVE AND OBJECTIVE BOX
INTERVAL HISTORY:   no inteval changes. Chart reviewed      VITAL SIGNS:  Vital Signs Last 24 Hrs  T(C): 36.6 (30 Jul 2018 07:15), Max: 37.1 (29 Jul 2018 14:51)  T(F): 97.8 (30 Jul 2018 07:15), Max: 98.8 (29 Jul 2018 14:51)  HR: 42 (30 Jul 2018 07:15) (42 - 66)  BP: 116/76 (30 Jul 2018 07:15) (116/76 - 182/80)  BP(mean): --  RR: 18 (30 Jul 2018 07:15) (18 - 18)  SpO2: 100% (30 Jul 2018 07:15) (97% - 100%)    PHYSICAL EXAMINATION:    Mentation:  awake  Language/Speech:  CN: right facial weakness  Visual Fields:  Motor: right hemiparesis  Sensory:  DTR:  Babinski:      MEDS:  MEDICATIONS  (STANDING):  amLODIPine   Tablet 10 milliGRAM(s) Oral daily  artificial  tears Solution 1 Drop(s) Both EYES two times a day  aspirin enteric coated 81 milliGRAM(s) Oral daily  cloNIDine 0.2 milliGRAM(s) Oral two times a day  dextrose 5%. 1000 milliLiter(s) (50 mL/Hr) IV Continuous <Continuous>  dextrose 50% Injectable 12.5 Gram(s) IV Push once  dextrose 50% Injectable 25 Gram(s) IV Push once  dextrose 50% Injectable 25 Gram(s) IV Push once  famotidine    Tablet 20 milliGRAM(s) Oral daily  gabapentin 100 milliGRAM(s) Oral three times a day  heparin  Injectable 5000 Unit(s) SubCutaneous every 12 hours  insulin glargine Injectable (LANTUS) 10 Unit(s) SubCutaneous at bedtime  insulin glargine Injectable (LANTUS) 20 Unit(s) SubCutaneous every morning  insulin lispro (HumaLOG) corrective regimen sliding scale   SubCutaneous three times a day before meals  losartan 50 milliGRAM(s) Oral daily  metoprolol tartrate 25 milliGRAM(s) Oral two times a day    MEDICATIONS  (PRN):  acetaminophen   Tablet 650 milliGRAM(s) Oral every 6 hours PRN Pain, fever, headache  dextrose 40% Gel 15 Gram(s) Oral once PRN Blood Glucose LESS THAN 70 milliGRAM(s)/deciliter  glucagon  Injectable 1 milliGRAM(s) IntraMuscular once PRN Glucose LESS THAN 70 milligrams/deciliter      LABS:                          11.0   7.5   )-----------( 301      ( 30 Jul 2018 10:09 )             34.2     07-30    143  |  101  |  28.0<H>  ----------------------------<  217<H>  4.0   |  28.0  |  0.66    Ca    10.0      30 Jul 2018 10:09            RADIOLOGY & ADDITIONAL STUDIES:        IMPRESSION & PLAN:    < from: MR Head No Cont (07.29.18 @ 13:08) >  No evidence for intracranial mass, acute territorial infarct, acute   intracranial hemorrhage, or midline shift.    No hemodynamically significant stenosis in the head or neck.    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   Neurologically cleared for discharge/disposition.  Please recontact as needed.

## 2018-07-30 NOTE — OCCUPATIONAL THERAPY INITIAL EVALUATION ADULT - ADDITIONAL COMMENTS
The following information is provided by pt and should be verified as pt has cognitive impairments. Pt reports she lives alone in apartment with no COMFORT and no steps inside; bedroom and bathroom on main level. Bathroom has bathtub with doors. Pt reports she was independent prior to admission, without device. Pt does not own any DME. Pt is left handed. Pt reports her son drives her to appointments, shopping, etc.

## 2018-07-30 NOTE — CONSULT NOTE ADULT - ASSESSMENT
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 Patient without any new focal neurologic findings- only residual R facial and R sided weakness from h/o L basal ganglia hemorrhage in November  - MRI/A w/ incidental R M1 bifurcation 5 x 4 mm aneurysm. No hemorrhage    PLAN:  - D/w Dr. Garcia  - No neurosurgical intervention warranted at this time  - Can f/u outpatient with Dr. Garcia in his office   - Continue care per primary team/neurology

## 2018-07-30 NOTE — CONSULT NOTE ADULT - SUBJECTIVE AND OBJECTIVE BOX
NOTE IS INCOMPLETE HISTORY OF PRESENT ILLNESS:   Patient is poor historian, history obtained from chart.    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 CT head in ED was negative, patient was admitted for stroke vs TIA, and started on ASA 81. MRI/MRA brain done showing right M1 bifurcation aneurysm. Patient does not have any complaints at time of assessment. Denies headache, paresthesias, n/v, chest pain, palpitations, SOB, abdominal pain.    PAST MEDICAL & SURGICAL HISTORY:  GERD (gastroesophageal reflux disease)  Diabetes mellitus  High cholesterol  Hypertension  No significant past surgical history    FAMILY HISTORY:  No pertinent family history in first degree relatives    SOCIAL HISTORY:  Currently resides at [facility]    Allergies  No Known Allergies    REVIEW OF SYSTEMS Negative except as noted in HPI    MEDICATIONS:  Antibiotics:  cefepime   IVPB      cefepime   IVPB 1000 milliGRAM(s) IV Intermittent once  cefepime   IVPB 1000 milliGRAM(s) IV Intermittent every 8 hours    Neuro:  acetaminophen   Tablet 650 milliGRAM(s) Oral every 6 hours PRN  gabapentin 100 milliGRAM(s) Oral three times a day    Anticoagulation:  aspirin enteric coated 81 milliGRAM(s) Oral daily  heparin  Injectable 5000 Unit(s) SubCutaneous every 12 hours    OTHER:  amLODIPine   Tablet 10 milliGRAM(s) Oral daily  artificial  tears Solution 1 Drop(s) Both EYES two times a day  cloNIDine 0.2 milliGRAM(s) Oral two times a day  dextrose 40% Gel 15 Gram(s) Oral once PRN  dextrose 50% Injectable 12.5 Gram(s) IV Push once  dextrose 50% Injectable 25 Gram(s) IV Push once  dextrose 50% Injectable 25 Gram(s) IV Push once  famotidine    Tablet 20 milliGRAM(s) Oral daily  glucagon  Injectable 1 milliGRAM(s) IntraMuscular once PRN  insulin glargine Injectable (LANTUS) 10 Unit(s) SubCutaneous at bedtime  insulin glargine Injectable (LANTUS) 20 Unit(s) SubCutaneous every morning  insulin lispro (HumaLOG) corrective regimen sliding scale   SubCutaneous three times a day before meals  losartan 50 milliGRAM(s) Oral daily  metoprolol tartrate 25 milliGRAM(s) Oral two times a day    IVF:  dextrose 5%. 1000 milliLiter(s) IV Continuous <Continuous>    Vital Signs Last 24 Hrs  T(C): 36.6 (30 Jul 2018 07:15), Max: 37.1 (29 Jul 2018 14:51)  T(F): 97.8 (30 Jul 2018 07:15), Max: 98.8 (29 Jul 2018 14:51)  HR: 42 (30 Jul 2018 07:15) (42 - 66)  BP: 116/76 (30 Jul 2018 07:15) (116/76 - 182/80)  BP(mean): --  RR: 18 (30 Jul 2018 07:15) (18 - 18)  SpO2: 100% (30 Jul 2018 07:15) (97% - 100%)    PHYSICAL EXAM:  GENERAL: NAD, well-groomed, well-developed  HEAD:  Atraumatic, normocephalic  EYES: Conjunctiva and sclera clear  NECK: Supple  MENTAL STATUS: Awake, alert, oriented to person and hospital only. Does not know date. Appropriately conversant in Khmer without noted aphasia; following simple commands  CRANIAL NERVES: PERRL; EOMI; R facial droop; facial sensation grossly intact to light touch b/l;  tongue midline  MOTOR: LUE 5/5, LLE HF 2/5 KF 3/5 PF/DF 5/5. RUE 3/5, RLE HF/KF 1/5 PF/DF 3/5  SENSATION: Right side diminished to light touch  CHEST/LUNG: Clear to auscultation bilaterally; no rales, rhonchi, wheezing  HEART: +S1/+S2; Regular rate    LABS:                        11.0   7.5   )-----------( 301      ( 30 Jul 2018 10:09 )             34.2     07-30    143  |  101  |  28.0<H>  ----------------------------<  217<H>  4.0   |  28.0  |  0.66    Ca    10.0      30 Jul 2018 10:09    RADIOLOGY & ADDITIONAL STUDIES:  - MR Angio Head No Cont (07.29.18 @ 13:08)  Findings:  Diagnostic accuracy is limited secondary to patient motion.  Gliosis adjacent to the occipital horn of the left lateral ventricle   related to sequela of prior hemorrhage. Ventricles and sulci are normal   in size and configuration for patients stated age. No extra axial   collection. No mass or edema. No diffusion evidence of acute infarction.   Cerebellar tonsils are in normal location. Moderate left mastoid   effusion. Mild polypoid mucosal thickening of the maxillary sinuses.  MRA of the [place] demonstrates normal antegrade flow in the   major intracranial arteries. 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 No   flow gap is seen to suggest high grade stenosis.    - CT Brain Stroke Protocol (07.27.18 @ 12:15)  FINDINGS: Old small right basal ganglia lacunar infarct.  There is periventricular and subcortical white matter hypodensity without   mass effect, nonspecific, likely representing moderate chronic   microvascular ischemic changes. There is no compelling evidence for an   acute transcortical infarction. There is no evidence of mass, mass   effect, midline shift or extra-axial fluid collection. The lateral   ventricles and cortical sulci are age-appropriate in size and   configuration. Moderate left-sided mastoid air cell effusion The orbits,   right mastoid air cells and visualized paranasal sinuses are otherwise   unremarkable. The calvarium is intact.    IMPRESSION:  Moderate chronic microvascular changes without evidence of   an acute transcortical infarction or hemorrhage. MR is a more sensitive   imaging modality for the evaluation of an acute infarction. Findings   discussed with Dr. Rg at 12:15 PM.

## 2018-07-30 NOTE — OCCUPATIONAL THERAPY INITIAL EVALUATION ADULT - VISUAL ACUITY
pt right eye closed throughout duration of evaluation; pt reports she had a fall prior to coming in and "eye has been like this since"

## 2018-07-31 LAB
ANION GAP SERPL CALC-SCNC: 11 MMOL/L — SIGNIFICANT CHANGE UP (ref 5–17)
BASOPHILS # BLD AUTO: 0 K/UL — SIGNIFICANT CHANGE UP (ref 0–0.2)
BASOPHILS NFR BLD AUTO: 0.2 % — SIGNIFICANT CHANGE UP (ref 0–2)
BUN SERPL-MCNC: 16 MG/DL — SIGNIFICANT CHANGE UP (ref 8–20)
CALCIUM SERPL-MCNC: 9.2 MG/DL — SIGNIFICANT CHANGE UP (ref 8.6–10.2)
CHLORIDE SERPL-SCNC: 104 MMOL/L — SIGNIFICANT CHANGE UP (ref 98–107)
CO2 SERPL-SCNC: 27 MMOL/L — SIGNIFICANT CHANGE UP (ref 22–29)
CREAT SERPL-MCNC: 0.5 MG/DL — SIGNIFICANT CHANGE UP (ref 0.5–1.3)
EOSINOPHIL # BLD AUTO: 0.1 K/UL — SIGNIFICANT CHANGE UP (ref 0–0.5)
EOSINOPHIL NFR BLD AUTO: 2.1 % — SIGNIFICANT CHANGE UP (ref 0–6)
GLUCOSE BLDC GLUCOMTR-MCNC: 111 MG/DL — HIGH (ref 70–99)
GLUCOSE BLDC GLUCOMTR-MCNC: 190 MG/DL — HIGH (ref 70–99)
GLUCOSE BLDC GLUCOMTR-MCNC: 249 MG/DL — HIGH (ref 70–99)
GLUCOSE SERPL-MCNC: 201 MG/DL — HIGH (ref 70–115)
HCT VFR BLD CALC: 31.2 % — LOW (ref 37–47)
HGB BLD-MCNC: 10 G/DL — LOW (ref 12–16)
LYMPHOCYTES # BLD AUTO: 2 K/UL — SIGNIFICANT CHANGE UP (ref 1–4.8)
LYMPHOCYTES # BLD AUTO: 38.7 % — SIGNIFICANT CHANGE UP (ref 20–55)
MAGNESIUM SERPL-MCNC: 1.9 MG/DL — SIGNIFICANT CHANGE UP (ref 1.8–2.6)
MCHC RBC-ENTMCNC: 24.1 PG — LOW (ref 27–31)
MCHC RBC-ENTMCNC: 32.1 G/DL — SIGNIFICANT CHANGE UP (ref 32–36)
MCV RBC AUTO: 75.2 FL — LOW (ref 81–99)
MONOCYTES # BLD AUTO: 0.3 K/UL — SIGNIFICANT CHANGE UP (ref 0–0.8)
MONOCYTES NFR BLD AUTO: 6.5 % — SIGNIFICANT CHANGE UP (ref 3–10)
NEUTROPHILS # BLD AUTO: 2.7 K/UL — SIGNIFICANT CHANGE UP (ref 1.8–8)
NEUTROPHILS NFR BLD AUTO: 52.3 % — SIGNIFICANT CHANGE UP (ref 37–73)
PLATELET # BLD AUTO: 293 K/UL — SIGNIFICANT CHANGE UP (ref 150–400)
POTASSIUM SERPL-MCNC: 4.1 MMOL/L — SIGNIFICANT CHANGE UP (ref 3.5–5.3)
POTASSIUM SERPL-SCNC: 4.1 MMOL/L — SIGNIFICANT CHANGE UP (ref 3.5–5.3)
RBC # BLD: 4.15 M/UL — LOW (ref 4.4–5.2)
RBC # FLD: 19.9 % — HIGH (ref 11–15.6)
SODIUM SERPL-SCNC: 142 MMOL/L — SIGNIFICANT CHANGE UP (ref 135–145)
WBC # BLD: 5.2 K/UL — SIGNIFICANT CHANGE UP (ref 4.8–10.8)
WBC # FLD AUTO: 5.2 K/UL — SIGNIFICANT CHANGE UP (ref 4.8–10.8)

## 2018-07-31 PROCEDURE — 93312 ECHO TRANSESOPHAGEAL: CPT | Mod: 26

## 2018-07-31 PROCEDURE — 93320 DOPPLER ECHO COMPLETE: CPT | Mod: 26

## 2018-07-31 PROCEDURE — 93325 DOPPLER ECHO COLOR FLOW MAPG: CPT | Mod: 26

## 2018-07-31 RX ORDER — INSULIN GLARGINE 100 [IU]/ML
12 INJECTION, SOLUTION SUBCUTANEOUS AT BEDTIME
Qty: 0 | Refills: 0 | Status: DISCONTINUED | OUTPATIENT
Start: 2018-07-31 | End: 2018-08-02

## 2018-07-31 RX ORDER — POLYETHYLENE GLYCOL 3350 17 G/17G
17 POWDER, FOR SOLUTION ORAL DAILY
Qty: 0 | Refills: 0 | Status: DISCONTINUED | OUTPATIENT
Start: 2018-07-31 | End: 2018-08-02

## 2018-07-31 RX ADMIN — Medication 1: at 08:16

## 2018-07-31 RX ADMIN — GABAPENTIN 100 MILLIGRAM(S): 400 CAPSULE ORAL at 05:06

## 2018-07-31 RX ADMIN — FAMOTIDINE 20 MILLIGRAM(S): 10 INJECTION INTRAVENOUS at 17:31

## 2018-07-31 RX ADMIN — Medication 0.2 MILLIGRAM(S): at 17:32

## 2018-07-31 RX ADMIN — Medication 0.2 MILLIGRAM(S): at 05:07

## 2018-07-31 RX ADMIN — LOSARTAN POTASSIUM 50 MILLIGRAM(S): 100 TABLET, FILM COATED ORAL at 05:07

## 2018-07-31 RX ADMIN — Medication 81 MILLIGRAM(S): at 17:35

## 2018-07-31 RX ADMIN — INSULIN GLARGINE 20 UNIT(S): 100 INJECTION, SOLUTION SUBCUTANEOUS at 08:17

## 2018-07-31 RX ADMIN — INSULIN GLARGINE 12 UNIT(S): 100 INJECTION, SOLUTION SUBCUTANEOUS at 21:18

## 2018-07-31 RX ADMIN — Medication 25 MILLIGRAM(S): at 05:06

## 2018-07-31 RX ADMIN — Medication 1 DROP(S): at 05:06

## 2018-07-31 RX ADMIN — POLYETHYLENE GLYCOL 3350 17 GRAM(S): 17 POWDER, FOR SOLUTION ORAL at 17:31

## 2018-07-31 RX ADMIN — GABAPENTIN 100 MILLIGRAM(S): 400 CAPSULE ORAL at 17:35

## 2018-07-31 RX ADMIN — HEPARIN SODIUM 5000 UNIT(S): 5000 INJECTION INTRAVENOUS; SUBCUTANEOUS at 05:06

## 2018-07-31 RX ADMIN — HEPARIN SODIUM 5000 UNIT(S): 5000 INJECTION INTRAVENOUS; SUBCUTANEOUS at 17:34

## 2018-07-31 RX ADMIN — AMLODIPINE BESYLATE 10 MILLIGRAM(S): 2.5 TABLET ORAL at 05:06

## 2018-07-31 RX ADMIN — GABAPENTIN 100 MILLIGRAM(S): 400 CAPSULE ORAL at 21:17

## 2018-07-31 RX ADMIN — Medication 1 DROP(S): at 17:34

## 2018-07-31 NOTE — PHYSICAL THERAPY INITIAL EVALUATION ADULT - ACTIVE RANGE OF MOTION EXAMINATION, REHAB EVAL
deficits as listed below/Left UE Active ROM was WFL (within functional limits)/RUE, RLE and LLE to mid-range

## 2018-07-31 NOTE — PHYSICAL THERAPY INITIAL EVALUATION ADULT - ADDITIONAL COMMENTS
PLOF unclear. Pt states she lives alone in a house. Per SW - pt has been in a FAM and is in the process of converting to LTC.

## 2018-07-31 NOTE — PROGRESS NOTE ADULT - ATTENDING COMMENTS
Patient seen and examined at the bedside. Agree with the above history, physical, assessment, and plan with the necessary amendments/elaborations below:    pt clinically stable. doing well. sp CHRISTOPHER this AM, pending report to eval for abnormal mobile mass noted on tte. blood cx x2 should be resulted by 8/1. once endocarditis has been ruled out, pt will be clinically stable for transfer to [facility].

## 2018-07-31 NOTE — PROGRESS NOTE ADULT - SUBJECTIVE AND OBJECTIVE BOX
CC: F/u facial droop    HPI:  84y/o Iranian speaking female who is a poor historian with PMH Diabetes mellitus, GERD, High cholesterol, Hypertension, L basal ganglia hemorrhage in last november with residual right sided weakness, she is nursing home resident from [facility], noted to have more pronounced face asymmetry and some confusion.  In the ED, Head CT was negative.    INTERVAL HPI/OVERNIGHT EVENTS: Patient seen and examined lying in bed with  on the phone #[phone].  Per RN, patient had bradycardia 30-40s this am.  Patient complained of dysarthria.  Patient denies any headache, dizziness, SOB, CP, abdominal pain, nausea, vomiting, dysuria.  (+) Constipation x5days.  Other ROS reviewed and are negative.    Vital Signs Last 24 Hrs  T(C): 36.7 (31 Jul 2018 07:54), Max: 36.7 (31 Jul 2018 07:54)  T(F): 98 (31 Jul 2018 07:54), Max: 98 (31 Jul 2018 07:54)  HR: 44 (31 Jul 2018 07:54) (44 - 64)  BP: 125/69 (31 Jul 2018 07:54) (125/69 - 160/82)  BP(mean): --  RR: 18 (31 Jul 2018 07:54) (16 - 18)  SpO2: 97% (31 Jul 2018 07:54) (97% - 98%)  I&O's Detail    30 Jul 2018 07:01  -  31 Jul 2018 07:00  --------------------------------------------------------  IN:  Total IN: 0 mL    OUT:    Voided: 2 mL  Total OUT: 2 mL    Total NET: -2 mL        PHYSICAL EXAM:  GENERAL: NAD  HEAD:  Atraumatic, Normocephalic  NECK: Supple, No JVD, Normal thyroid  NERVOUS SYSTEM:  Alert & Oriented X3, Good concentration; (+) Dysarthria; generalized weakness R>L  CHEST/LUNG: Clear to auscultation bilaterally; No rales, rhonchi, wheezing, or rubs  HEART: Regular rate and rhythm; No murmurs, rubs, or gallops  ABDOMEN: Soft, Nontender, Nondistended; Bowel sounds present  EXTREMITIES:  2+ Peripheral Pulses, No clubbing, cyanosis, or edema                              10.0   5.2   )-----------( 293      ( 31 Jul 2018 08:13 )             31.2     31 Jul 2018 08:13    142    |  104    |  16.0   ----------------------------<  201    4.1     |  27.0   |  0.50     Ca    9.2        31 Jul 2018 08:13  Mg     1.9       31 Jul 2018 08:13        CAPILLARY BLOOD GLUCOSE  POCT Blood Glucose.: 190 mg/dL (31 Jul 2018 08:13)  POCT Blood Glucose.: 306 mg/dL (30 Jul 2018 20:21)  POCT Blood Glucose.: 149 mg/dL (30 Jul 2018 17:45)        Hemoglobin A1C, Whole Blood: 7.8 % (07-28-18 @ 08:38)    MEDICATIONS  (STANDING):  amLODIPine   Tablet 10 milliGRAM(s) Oral daily  artificial  tears Solution 1 Drop(s) Both EYES two times a day  aspirin enteric coated 81 milliGRAM(s) Oral daily  cloNIDine 0.2 milliGRAM(s) Oral two times a day  dextrose 5%. 1000 milliLiter(s) (50 mL/Hr) IV Continuous <Continuous>  dextrose 50% Injectable 12.5 Gram(s) IV Push once  dextrose 50% Injectable 25 Gram(s) IV Push once  dextrose 50% Injectable 25 Gram(s) IV Push once  famotidine    Tablet 20 milliGRAM(s) Oral daily  gabapentin 100 milliGRAM(s) Oral three times a day  heparin  Injectable 5000 Unit(s) SubCutaneous every 12 hours  insulin glargine Injectable (LANTUS) 20 Unit(s) SubCutaneous every morning  insulin glargine Injectable (LANTUS) 12 Unit(s) SubCutaneous at bedtime  insulin lispro (HumaLOG) corrective regimen sliding scale   SubCutaneous three times a day before meals  losartan 50 milliGRAM(s) Oral daily  polyethylene glycol 3350 17 Gram(s) Oral daily    MEDICATIONS  (PRN):  acetaminophen   Tablet 650 milliGRAM(s) Oral every 6 hours PRN Pain, fever, headache  dextrose 40% Gel 15 Gram(s) Oral once PRN Blood Glucose LESS THAN 70 milliGRAM(s)/deciliter  glucagon  Injectable 1 milliGRAM(s) IntraMuscular once PRN Glucose LESS THAN 70 milligrams/deciliter

## 2018-07-31 NOTE — PHYSICAL THERAPY INITIAL EVALUATION ADULT - PERTINENT HX OF CURRENT PROBLEM, REHAB EVAL
Pt is an 82 y/o female who presented from [facility] with facial droop. Pt has hx/o CVA with R hemiparesis.

## 2018-08-01 ENCOUNTER — TRANSCRIPTION ENCOUNTER (OUTPATIENT)
Age: 83
End: 2018-08-01

## 2018-08-01 LAB
ANION GAP SERPL CALC-SCNC: 13 MMOL/L — SIGNIFICANT CHANGE UP (ref 5–17)
APPEARANCE UR: CLEAR — SIGNIFICANT CHANGE UP
BILIRUB UR-MCNC: NEGATIVE — SIGNIFICANT CHANGE UP
BUN SERPL-MCNC: 15 MG/DL — SIGNIFICANT CHANGE UP (ref 8–20)
CALCIUM SERPL-MCNC: 9.6 MG/DL — SIGNIFICANT CHANGE UP (ref 8.6–10.2)
CHLORIDE SERPL-SCNC: 104 MMOL/L — SIGNIFICANT CHANGE UP (ref 98–107)
CO2 SERPL-SCNC: 26 MMOL/L — SIGNIFICANT CHANGE UP (ref 22–29)
COLOR SPEC: YELLOW — SIGNIFICANT CHANGE UP
CREAT SERPL-MCNC: 0.47 MG/DL — LOW (ref 0.5–1.3)
DIFF PNL FLD: NEGATIVE — SIGNIFICANT CHANGE UP
GLUCOSE BLDC GLUCOMTR-MCNC: 141 MG/DL — HIGH (ref 70–99)
GLUCOSE BLDC GLUCOMTR-MCNC: 176 MG/DL — HIGH (ref 70–99)
GLUCOSE BLDC GLUCOMTR-MCNC: 253 MG/DL — HIGH (ref 70–99)
GLUCOSE BLDC GLUCOMTR-MCNC: 339 MG/DL — HIGH (ref 70–99)
GLUCOSE SERPL-MCNC: 97 MG/DL — SIGNIFICANT CHANGE UP (ref 70–115)
GLUCOSE UR QL: NEGATIVE MG/DL — SIGNIFICANT CHANGE UP
HCT VFR BLD CALC: 31.8 % — LOW (ref 37–47)
HGB BLD-MCNC: 10.3 G/DL — LOW (ref 12–16)
KETONES UR-MCNC: NEGATIVE — SIGNIFICANT CHANGE UP
LEUKOCYTE ESTERASE UR-ACNC: NEGATIVE — SIGNIFICANT CHANGE UP
MAGNESIUM SERPL-MCNC: 2 MG/DL — SIGNIFICANT CHANGE UP (ref 1.6–2.6)
MCHC RBC-ENTMCNC: 24.3 PG — LOW (ref 27–31)
MCHC RBC-ENTMCNC: 32.4 G/DL — SIGNIFICANT CHANGE UP (ref 32–36)
MCV RBC AUTO: 75 FL — LOW (ref 81–99)
NITRITE UR-MCNC: NEGATIVE — SIGNIFICANT CHANGE UP
PH UR: 7 — SIGNIFICANT CHANGE UP (ref 5–8)
PLATELET # BLD AUTO: 292 K/UL — SIGNIFICANT CHANGE UP (ref 150–400)
POTASSIUM SERPL-MCNC: 4 MMOL/L — SIGNIFICANT CHANGE UP (ref 3.5–5.3)
POTASSIUM SERPL-SCNC: 4 MMOL/L — SIGNIFICANT CHANGE UP (ref 3.5–5.3)
PROT UR-MCNC: NEGATIVE MG/DL — SIGNIFICANT CHANGE UP
RBC # BLD: 4.24 M/UL — LOW (ref 4.4–5.2)
RBC # FLD: 20 % — HIGH (ref 11–15.6)
SODIUM SERPL-SCNC: 143 MMOL/L — SIGNIFICANT CHANGE UP (ref 135–145)
SP GR SPEC: 1.01 — SIGNIFICANT CHANGE UP (ref 1.01–1.02)
UROBILINOGEN FLD QL: NEGATIVE MG/DL — SIGNIFICANT CHANGE UP
WBC # BLD: 6.8 K/UL — SIGNIFICANT CHANGE UP (ref 4.8–10.8)
WBC # FLD AUTO: 6.8 K/UL — SIGNIFICANT CHANGE UP (ref 4.8–10.8)

## 2018-08-01 PROCEDURE — 99239 HOSP IP/OBS DSCHRG MGMT >30: CPT

## 2018-08-01 PROCEDURE — 99232 SBSQ HOSP IP/OBS MODERATE 35: CPT

## 2018-08-01 RX ORDER — INSULIN GLARGINE 100 [IU]/ML
12 INJECTION, SOLUTION SUBCUTANEOUS
Qty: 0 | Refills: 0 | COMMUNITY
Start: 2018-08-01

## 2018-08-01 RX ORDER — INSULIN ASPART 100 [IU]/ML
0 INJECTION, SOLUTION SUBCUTANEOUS
Qty: 0 | Refills: 0 | COMMUNITY

## 2018-08-01 RX ORDER — POLYETHYLENE GLYCOL 3350 17 G/17G
17 POWDER, FOR SOLUTION ORAL
Qty: 0 | Refills: 0 | COMMUNITY
Start: 2018-08-01

## 2018-08-01 RX ORDER — ASPIRIN/CALCIUM CARB/MAGNESIUM 324 MG
1 TABLET ORAL
Qty: 0 | Refills: 0 | COMMUNITY
Start: 2018-08-01

## 2018-08-01 RX ORDER — ALBUTEROL 90 UG/1
3 AEROSOL, METERED ORAL
Qty: 0 | Refills: 0 | COMMUNITY

## 2018-08-01 RX ORDER — INSULIN GLARGINE 100 [IU]/ML
20 INJECTION, SOLUTION SUBCUTANEOUS
Qty: 0 | Refills: 0 | COMMUNITY
Start: 2018-08-01

## 2018-08-01 RX ORDER — IPRATROPIUM BROMIDE 0.2 MG/ML
2.5 SOLUTION, NON-ORAL INHALATION
Qty: 0 | Refills: 0 | COMMUNITY

## 2018-08-01 RX ORDER — INSULIN LISPRO 100/ML
3 VIAL (ML) SUBCUTANEOUS ONCE
Qty: 0 | Refills: 0 | Status: COMPLETED | OUTPATIENT
Start: 2018-08-01 | End: 2018-08-01

## 2018-08-01 RX ORDER — INSULIN GLARGINE 100 [IU]/ML
10 INJECTION, SOLUTION SUBCUTANEOUS
Qty: 0 | Refills: 0 | COMMUNITY

## 2018-08-01 RX ADMIN — Medication 81 MILLIGRAM(S): at 11:42

## 2018-08-01 RX ADMIN — HEPARIN SODIUM 5000 UNIT(S): 5000 INJECTION INTRAVENOUS; SUBCUTANEOUS at 17:44

## 2018-08-01 RX ADMIN — Medication 3 UNIT(S): at 23:48

## 2018-08-01 RX ADMIN — POLYETHYLENE GLYCOL 3350 17 GRAM(S): 17 POWDER, FOR SOLUTION ORAL at 11:42

## 2018-08-01 RX ADMIN — GABAPENTIN 100 MILLIGRAM(S): 400 CAPSULE ORAL at 13:17

## 2018-08-01 RX ADMIN — INSULIN GLARGINE 12 UNIT(S): 100 INJECTION, SOLUTION SUBCUTANEOUS at 22:25

## 2018-08-01 RX ADMIN — GABAPENTIN 100 MILLIGRAM(S): 400 CAPSULE ORAL at 05:06

## 2018-08-01 RX ADMIN — Medication 1 DROP(S): at 17:44

## 2018-08-01 RX ADMIN — GABAPENTIN 100 MILLIGRAM(S): 400 CAPSULE ORAL at 22:25

## 2018-08-01 RX ADMIN — HEPARIN SODIUM 5000 UNIT(S): 5000 INJECTION INTRAVENOUS; SUBCUTANEOUS at 05:05

## 2018-08-01 RX ADMIN — Medication 1 DROP(S): at 05:05

## 2018-08-01 RX ADMIN — Medication 650 MILLIGRAM(S): at 12:51

## 2018-08-01 RX ADMIN — Medication 3: at 17:44

## 2018-08-01 RX ADMIN — Medication 1: at 11:49

## 2018-08-01 RX ADMIN — INSULIN GLARGINE 20 UNIT(S): 100 INJECTION, SOLUTION SUBCUTANEOUS at 08:55

## 2018-08-01 RX ADMIN — FAMOTIDINE 20 MILLIGRAM(S): 10 INJECTION INTRAVENOUS at 11:42

## 2018-08-01 RX ADMIN — Medication 0.2 MILLIGRAM(S): at 17:45

## 2018-08-01 NOTE — DISCHARGE NOTE ADULT - CARE PROVIDERS DIRECT ADDRESSES
,DirectAddress_Unknown,[email or web address]

## 2018-08-01 NOTE — DISCHARGE NOTE ADULT - FUNCTIONAL STATUS DATE
01-Aug-2018 02-Aug-2018 Scheduled for direct laryngoscopy, biopsy oropharynx, esophagoscopy on 5/18/2017. labs done and results pending. Preop instruction given and explained. Famotidine provided with instruction. Verbalized understanding.

## 2018-08-01 NOTE — DISCHARGE NOTE ADULT - PLAN OF CARE
Improved Continue current medications as prescribed.  Follow up with primary doctor. Continue current medications as prescribed. Metoprolol held secondary to bradycardia.  Follow up with primary doctor. LDL 20  Follow up with primary doctor. Incidental finding.  Follow up with Dr. Garcia.

## 2018-08-01 NOTE — DISCHARGE NOTE ADULT - HOSPITAL COURSE
82y/o Kuwaiti speaking female who is a poor historian with PMH Diabetes mellitus, GERD, High cholesterol, Hypertension, L basal ganglia hemorrhage in last november with residual right sided weakness, she is nursing home resident from [facility], noted to have more pronounced face asymmetry and some confusion.  In the ED, Head CT was negative.  Patient admitted under medicine under impression of stroke Vs TIA. 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 Elevated velocities in the left internal carotid artery, probably due to tortuosity, Vertebral arteries: Antegrade flow, TTE done showed Left ventricular ejection fraction, by visual estimation, is >75%,  Spectral Doppler shows impaired relaxation pattern of left ventricular myocardial filling (Grade I diastolic dysfunction), Thickening of the anterior and posterior mitral valve leaflets, Trace tricuspid regurgitation, Sclerotic aortic valve with normal opening. Strand like mobile Echo densities attached to the tips of aortic valve leaflets. S/p CHRISTOPHER 7/31/18 with calcified echo density attached to left coronary cusp of aortic valve - recommended repeat CHRISTOPHER in 3-6 months, Blood cultures negative, UA negative.  TSH WNL, and Hba1c 7.8, PT and OT consulted - rec [facility], speech swallow appreciated, diet changed to Puree with thin liquids. Patient stable for discharge to [facility] today.

## 2018-08-01 NOTE — DISCHARGE NOTE ADULT - MEDICATION SUMMARY - MEDICATIONS TO TAKE
I will START or STAY ON the medications listed below when I get home from the hospital:    acetaminophen 325 mg oral tablet  -- 2 tab(s) by mouth 2 times a day  -- Indication: For Pain    aspirin 81 mg oral delayed release tablet  -- 1 tab(s) by mouth once a day  -- Indication: For Home med    losartan 50 mg oral tablet  -- 1 tab(s) by mouth once a day  -- Indication: For HTN    cloNIDine 0.2 mg oral tablet  -- 1 tab(s) by mouth 2 times a day  -- Indication: For HTN    gabapentin 100 mg oral capsule  -- 1 cap(s) by mouth 3 times a day  -- Indication: For Home med    insulin glargine  -- 20 unit(s) subcutaneous once a day in am  -- Indication: For Diabetes    insulin glargine  -- 12 unit(s) subcutaneous once a day (at bedtime)  -- Indication: For Diabetes    amLODIPine 10 mg oral tablet  -- 1 tab(s) by mouth once a day  -- Indication: For HTN    famotidine 20 mg oral tablet  -- 1 tab(s) by mouth once a day  -- Indication: For GERD    polyethylene glycol 3350 oral powder for reconstitution  -- 17 gram(s) by mouth once a day  -- Indication: For Constipation    Artificial Tears ophthalmic solution  -- 1 drop(s) to each affected eye 2 times a day  -- Indication: For Home med

## 2018-08-01 NOTE — DISCHARGE NOTE ADULT - MEDICATION SUMMARY - MEDICATIONS TO STOP TAKING
I will STOP taking the medications listed below when I get home from the hospital:    atorvastatin 80 mg oral tablet  -- 1 tab(s) by mouth once a day (at bedtime)    metoprolol tartrate 25 mg oral tablet  -- 1 tab(s) by mouth 2 times a day    albuterol 2.5 mg/3 mL (0.083%) inhalation solution  -- 3 milliliter(s) inhaled every 6 hours, As Needed    Fleet Enema 7 g-19 g rectal enema  -- 133 milliliter(s) rectally once every 8 shifts if no bowel movement    ipratropium 500 mcg/2.5 mL inhalation solution  -- 2.5 milliliter(s) inhaled 4 times a day, As Needed

## 2018-08-01 NOTE — DISCHARGE NOTE ADULT - CARE PLAN
Principal Discharge DX:	Metabolic encephalopathy  Goal:	Improved  Assessment and plan of treatment:	Continue current medications as prescribed.  Follow up with primary doctor.  Secondary Diagnosis:	Essential hypertension  Assessment and plan of treatment:	Continue current medications as prescribed. Metoprolol held secondary to bradycardia.  Follow up with primary doctor.  Secondary Diagnosis:	High cholesterol  Assessment and plan of treatment:	LDL 20  Follow up with primary doctor.  Secondary Diagnosis:	Diabetes mellitus  Assessment and plan of treatment:	Continue current medications as prescribed.  Follow up with primary doctor.  Secondary Diagnosis:	Cerebral aneurysm  Assessment and plan of treatment:	Incidental finding.  Follow up with Dr. Garcia.

## 2018-08-01 NOTE — PROGRESS NOTE ADULT - SUBJECTIVE AND OBJECTIVE BOX
[facility]  INFECTIOUS DISEASES AND INTERNAL MEDICINE at [place]  =======================================================  Josh Bertrand MD  Diplomates American Board of Internal Medicine and Infectious Diseases  =======================================================    HAYLEE VINEETJENYRAS [id number]    Follow up: r/o endocarditis     Allergies:  No Known Allergies      Medications:  acetaminophen   Tablet 650 milliGRAM(s) Oral every 6 hours PRN  amLODIPine   Tablet 10 milliGRAM(s) Oral daily  artificial  tears Solution 1 Drop(s) Both EYES two times a day  aspirin enteric coated 81 milliGRAM(s) Oral daily  cloNIDine 0.2 milliGRAM(s) Oral two times a day  dextrose 40% Gel 15 Gram(s) Oral once PRN  dextrose 5%. 1000 milliLiter(s) IV Continuous <Continuous>  dextrose 50% Injectable 12.5 Gram(s) IV Push once  dextrose 50% Injectable 25 Gram(s) IV Push once  dextrose 50% Injectable 25 Gram(s) IV Push once  famotidine    Tablet 20 milliGRAM(s) Oral daily  gabapentin 100 milliGRAM(s) Oral three times a day  glucagon  Injectable 1 milliGRAM(s) IntraMuscular once PRN  heparin  Injectable 5000 Unit(s) SubCutaneous every 12 hours  insulin glargine Injectable (LANTUS) 20 Unit(s) SubCutaneous every morning  insulin glargine Injectable (LANTUS) 12 Unit(s) SubCutaneous at bedtime  insulin lispro (HumaLOG) corrective regimen sliding scale   SubCutaneous three times a day before meals  losartan 50 milliGRAM(s) Oral daily  polyethylene glycol 3350 17 Gram(s) Oral daily    SOCIAL       FAMILY   FAMILY HISTORY:  No pertinent family history in first degree relatives    REVIEW OF SYSTEMS:  CONSTITUTIONAL:  No Fever or chills  HEENT:   No diplopia or blurred vision.  No earache, sore throat or runny nose.  CARDIOVASCULAR:  No pressure, squeezing, strangling, tightness, heaviness or aching about the chest, neck, axilla or epigastrium.  RESPIRATORY:  No cough, shortness of breath, PND or orthopnea.  GASTROINTESTINAL:  No nausea, vomiting or diarrhea.  GENITOURINARY:  No dysuria, frequency or urgency. No Blood in urine  MUSCULOSKELETAL:   AS PER HPI  SKIN:  No change in skin, hair or nails.  NEUROLOGIC: BELLS PALSY  PSYCHIATRIC:  No disorder of thought or mood.  ENDOCRINE:  No heat or cold intolerance, polyuria or polydipsia.  HEMATOLOGICAL:  No easy bruising or bleeding.            Physical Exam:  ICU Vital Signs Last 24 Hrs  T(C): 37 (01 Aug 2018 07:22), Max: 37 (31 Jul 2018 23:45)  T(F): 98.6 (01 Aug 2018 07:22), Max: 98.6 (31 Jul 2018 23:45)  HR: 51 (01 Aug 2018 07:22) (47 - 61)  BP: 133/88 (01 Aug 2018 07:22) (133/88 - 161/71)  BP(mean): --  ABP: --  ABP(mean): --  RR: 18 (01 Aug 2018 07:22) (17 - 18)  SpO2: 98% (01 Aug 2018 07:22) (97% - 100%)    GEN: NAD, pleasant  HEENT: normocephalic and atraumatic. EOMI. GREGORIO.    NECK: Supple. No carotid bruits.  No lymphadenopathy or thyromegaly.  LUNGS: Clear to auscultation.  HEART: Regular rate and rhythm without murmur.  ABDOMEN: Soft, nontender, and nondistended.  Positive bowel sounds.    : No CVA tenderness  EXTREMITIES: Without any cyanosis, clubbing, rash, lesions or edema.  MSK: no joint swelling  NEUROLOGIC: BELLS PALSY  PSYCHIATRIC: Appropriate affect .  SKIN: No ulceration or induration present.        Labs:  08-01    143  |  104  |  15.0  ----------------------------<  97  4.0   |  26.0  |  0.47<L>    Ca    9.6      01 Aug 2018 08:06  Mg     2.0     08-01                            10.3   6.8   )-----------( 292      ( 01 Aug 2018 08:06 )             31.8                 CAPILLARY BLOOD GLUCOSE      POCT Blood Glucose.: 141 mg/dL (01 Aug 2018 08:13)  POCT Blood Glucose.: 249 mg/dL (31 Jul 2018 21:02)  POCT Blood Glucose.: 111 mg/dL (31 Jul 2018 17:34)        RECENT CULTURES:

## 2018-08-01 NOTE — PROGRESS NOTE ADULT - SUBJECTIVE AND OBJECTIVE BOX
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  In the ED, Head CT was negative.    INTERVAL HPI/OVERNIGHT EVENTS: Patient seen and examined lying in bed with  on the phone #[phone].  Patient reports feeling better.  Patient denies any headache, dizziness, SOB, CP, abdominal pain, nausea, vomiting, dysuria.  (+) BM 2days ago as per patient.  Patient is forgetful at times.    Vital Signs Last 24 Hrs  T(C): 37 (01 Aug 2018 07:22), Max: 37 (31 Jul 2018 23:45)  T(F): 98.6 (01 Aug 2018 07:22), Max: 98.6 (31 Jul 2018 23:45)  HR: 51 (01 Aug 2018 07:22) (47 - 61)  BP: 133/88 (01 Aug 2018 07:22) (133/88 - 161/71)  BP(mean): --  RR: 18 (01 Aug 2018 07:22) (17 - 18)  SpO2: 98% (01 Aug 2018 07:22) (97% - 100%)  I&O's Detail    31 Jul 2018 07:01  -  01 Aug 2018 07:00  --------------------------------------------------------  IN:    Oral Fluid: 240 mL  Total IN: 240 mL    OUT:  Total OUT: 0 mL    Total NET: 240 mL        PHYSICAL EXAM:  GENERAL: NAD  HEAD:  Atraumatic, Normocephalic  NECK: Supple, No JVD, Normal thyroid  NERVOUS SYSTEM:  Alert & Oriented X3, forgetful at times, Good concentration; Dysarthria, (+) facial droop, generalized weakness  CHEST/LUNG: Clear to auscultation bilaterally; No rales, rhonchi, wheezing, or rubs  HEART: Regular rate and rhythm; No murmurs, rubs, or gallops  ABDOMEN: Soft, Nontender, Nondistended; Bowel sounds present  EXTREMITIES:  2+ Peripheral Pulses, No clubbing, cyanosis, or edema                              10.3   6.8   )-----------( 292      ( 01 Aug 2018 08:06 )             31.8     01 Aug 2018 08:06    143    |  104    |  15.0   ----------------------------<  97     4.0     |  26.0   |  0.47     Ca    9.6        01 Aug 2018 08:06  Mg     2.0       01 Aug 2018 08:06        CAPILLARY BLOOD GLUCOSE  POCT Blood Glucose.: 141 mg/dL (01 Aug 2018 08:13)  POCT Blood Glucose.: 249 mg/dL (31 Jul 2018 21:02)  POCT Blood Glucose.: 111 mg/dL (31 Jul 2018 17:34)        Hemoglobin A1C, Whole Blood: 7.8 % (07-28-18 @ 08:38)    MEDICATIONS  (STANDING):  amLODIPine   Tablet 10 milliGRAM(s) Oral daily  artificial  tears Solution 1 Drop(s) Both EYES two times a day  aspirin enteric coated 81 milliGRAM(s) Oral daily  cloNIDine 0.2 milliGRAM(s) Oral two times a day  dextrose 5%. 1000 milliLiter(s) (50 mL/Hr) IV Continuous <Continuous>  dextrose 50% Injectable 12.5 Gram(s) IV Push once  dextrose 50% Injectable 25 Gram(s) IV Push once  dextrose 50% Injectable 25 Gram(s) IV Push once  famotidine    Tablet 20 milliGRAM(s) Oral daily  gabapentin 100 milliGRAM(s) Oral three times a day  heparin  Injectable 5000 Unit(s) SubCutaneous every 12 hours  insulin glargine Injectable (LANTUS) 20 Unit(s) SubCutaneous every morning  insulin glargine Injectable (LANTUS) 12 Unit(s) SubCutaneous at bedtime  insulin lispro (HumaLOG) corrective regimen sliding scale   SubCutaneous three times a day before meals  losartan 50 milliGRAM(s) Oral daily  polyethylene glycol 3350 17 Gram(s) Oral daily    MEDICATIONS  (PRN):  acetaminophen   Tablet 650 milliGRAM(s) Oral every 6 hours PRN Pain, fever, headache  dextrose 40% Gel 15 Gram(s) Oral once PRN Blood Glucose LESS THAN 70 milliGRAM(s)/deciliter  glucagon  Injectable 1 milliGRAM(s) IntraMuscular once PRN Glucose LESS THAN 70 milligrams/deciliter      RADIOLOGY & ADDITIONAL TESTS:  < from: CHRISTOPHER Echo Doppler (07.31.18 @ 11:45) >  EXAM:  ECHO TRANSESOPHAGEAL    EXAM:  DOPPLER ECHO COMP W SPECTRAL    EXAM:  DOPPLER ECHOCARD COLOR FLOW      PROCEDURE DATE:  Jul 31 2018   .      INTERPRETATION:  REPORT:    TRANSESOPHAGEAL ECHOCARDIOGRAM REPORT         Patient Name:   ZION LEACH Patient Location: Inpatient  [facility] Rec #:  [id number]        Accession #:      [id number]  Account #:                      Height:           61.8 in 157.0 cm  YOB: 1934       Weight:           123.5 lb 56.00 kg  Patient Age:    83 years        BSA:              1.55 m²  Patient Gender: F               BP:               110/60 mmHg       Date of Exam:        7/31/2018 11:45:46 AM  Sonographer:         Lili Gates  Referring Physician: Arash Márquez MD, VI    Procedure:Transesophageal Echocardiogram.  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    SPECTRAL DOPPLER ANALYSIS (where applicable):      PROCEDURE: After discussion of the risks and benefits of the CHRISTOPHER, an   informed consent was obtained by the cardiologist. Intravenous sedation   was performed by anesthesia. Local oropharyngeal anesthetic was provided   with viscous lidocaine. The CHRISTOPHER probe was passed by the cardiologist   without difficulty. Images were obtained with the patient in a left   lateral decubitus position.     PHYSICIAN INTERPRETATION:  Left Ventricle: The left ventricular internal cavity size is moderately   increased.  Global LV systolic function was normal. Left ventricular ejection   fraction, by visual estimation, is 60 to 65%.  Right Ventricle: Normal right ventricular size and function.  Left Atrium: Left atrial enlargement. Color flow doppler and intravenous   injection of agitated saline demonstrates the presence of an intact intra   atrial septum.  Right Atrium: Right atrial enlargement.  Pericardium: There is no evidence of pericardial effusion.  Mitral Valve: Structurally normal mitral valve, with normal leaflet   excursion. Trace mitral valve regurgitation is seen.  Tricuspid Valve: Structurally normal tricuspid valve, with normal leaflet   excursion. Trivial tricuspid regurgitation is visualized.  Aortic Valve: The aortic valve is trileaflet. Mild aortic valve   regurgitation is seen.  Pulmonic Valve: Structurally normal pulmonic valve, with normal leaflet   excursion.  Aorta: The aortic root, ascending aorta, aortic arch and descending aorta   are all structurally normal, with no evidence of dilitation or   obstruction.  Pulmonary Artery: The pulmonary artery is not well seen.  Shunts: Agitated saline contrast was given intravenously to evaluate for   intracardiac shunting.       Summary:   1. Technically good study.   2. Normalglobal left ventricular systolic function.   3. Left ventricular ejection fraction, by visual estimation, is 60 to   65%.   4. Moderately increased left ventricular internal cavity size.   5. Color flow doppler and intravenous injection of agitated saline   demonstrates the presence of an intact intra atrial septum.   6. Normal right ventricular size and function.   7. Trace tricuspid regurgitation.   8. Mild aortic regurgitation.   9. There is no evidence of pericardial effusion.  10. There is acalcified echo density attached to the left coronary cusp   of the aortic valve, most likely calcification of the leaflet. Recommend   repeating CHRISTOPHER in 3-6 months for progression.    [id number] Willi Camacho MD, Electronically signed on 7/31/2018 at 4:52:43 PM         *** Final ***                  WILLI CAMACHO   This document has been electronically signed. Jul 31 2018 11:45AM          < end of copied text >

## 2018-08-01 NOTE — DISCHARGE NOTE ADULT - CARE PROVIDER_API CALL
Lyle Gaviria), Neurology  [address] [place] [address]  Phone: [phone]  Fax: [phone]    Long Garcia), Neurosurgery  [facility]  [address]  [place] [address]  Phone: [phone]  Fax: [phone]    Anup Nj), Cardiovascular Disease  [address]  [address]  [place] [address]  Phone: [phone]  Fax: [phone]

## 2018-08-01 NOTE — DISCHARGE NOTE ADULT - MEDICATION SUMMARY - MEDICATIONS TO CHANGE
I will SWITCH the dose or number of times a day I take the medications listed below when I get home from the hospital:    insulin glargine  -- 30 unit(s) subcutaneous once a day (at bedtime)    Lantus 100 units/mL subcutaneous solution  -- 10 unit(s) subcutaneous once a day (at bedtime)

## 2018-08-01 NOTE — DISCHARGE NOTE ADULT - PATIENT PORTAL LINK FT
You can access the [facility] Patient Portal, offered by [facility], by registering with the following website: [email or web address]

## 2018-08-02 ENCOUNTER — INBOUND DOCUMENT (OUTPATIENT)
Age: 83
End: 2018-08-02

## 2018-08-02 VITALS
TEMPERATURE: 99 F | SYSTOLIC BLOOD PRESSURE: 161 MMHG | DIASTOLIC BLOOD PRESSURE: 82 MMHG | RESPIRATION RATE: 16 BRPM | HEART RATE: 74 BPM

## 2018-08-02 LAB
CULTURE RESULTS: NO GROWTH — SIGNIFICANT CHANGE UP
GLUCOSE BLDC GLUCOMTR-MCNC: 143 MG/DL — HIGH (ref 70–99)
GLUCOSE BLDC GLUCOMTR-MCNC: 204 MG/DL — HIGH (ref 70–99)
GLUCOSE BLDC GLUCOMTR-MCNC: 234 MG/DL — HIGH (ref 70–99)
SPECIMEN SOURCE: SIGNIFICANT CHANGE UP

## 2018-08-02 RX ADMIN — Medication 650 MILLIGRAM(S): at 12:32

## 2018-08-02 RX ADMIN — GABAPENTIN 100 MILLIGRAM(S): 400 CAPSULE ORAL at 05:28

## 2018-08-02 RX ADMIN — HEPARIN SODIUM 5000 UNIT(S): 5000 INJECTION INTRAVENOUS; SUBCUTANEOUS at 05:28

## 2018-08-02 RX ADMIN — LOSARTAN POTASSIUM 50 MILLIGRAM(S): 100 TABLET, FILM COATED ORAL at 09:35

## 2018-08-02 RX ADMIN — INSULIN GLARGINE 20 UNIT(S): 100 INJECTION, SOLUTION SUBCUTANEOUS at 09:43

## 2018-08-02 RX ADMIN — Medication 2: at 12:33

## 2018-08-02 RX ADMIN — Medication 81 MILLIGRAM(S): at 12:31

## 2018-08-02 RX ADMIN — Medication 0.2 MILLIGRAM(S): at 05:28

## 2018-08-02 RX ADMIN — FAMOTIDINE 20 MILLIGRAM(S): 10 INJECTION INTRAVENOUS at 12:32

## 2018-08-02 RX ADMIN — Medication 1 DROP(S): at 05:29

## 2018-08-02 RX ADMIN — POLYETHYLENE GLYCOL 3350 17 GRAM(S): 17 POWDER, FOR SOLUTION ORAL at 12:32

## 2018-08-02 NOTE — PROGRESS NOTE ADULT - SUBJECTIVE AND OBJECTIVE BOX
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  In the ED, Head CT was negative.    INTERVAL HPI/OVERNIGHT EVENTS: Patient seen and examined lying in bed with  on the phone #[phone].  Patient denies any headache, dizziness, SOB, CP, abdominal pain, nausea, vomiting, dysuria.  Other ROS reviewed and are negative.    Vital Signs Last 24 Hrs  T(C): 36.7 (02 Aug 2018 08:25), Max: 37.2 (01 Aug 2018 15:34)  T(F): 98.1 (02 Aug 2018 08:25), Max: 98.9 (01 Aug 2018 15:34)  HR: 66 (02 Aug 2018 09:32) (56 - 94)  BP: 136/75 (02 Aug 2018 09:32) (132/60 - 168/90)  BP(mean): --  RR: 18 (02 Aug 2018 08:25) (18 - 18)  SpO2: 97% (02 Aug 2018 08:25) (97% - 99%)  I&O's Detail    01 Aug 2018 07:01  -  02 Aug 2018 07:00  --------------------------------------------------------  IN:    Oral Fluid: 240 mL  Total IN: 240 mL    OUT:  Total OUT: 0 mL    Total NET: 240 mL        PHYSICAL EXAM:  GENERAL: NAD, well-groomed, well-developed  HEAD:  Atraumatic, Normocephalic  NECK: Supple, No JVD, Normal thyroid  NERVOUS SYSTEM:  Alert & Oriented X3, Forgetful at times, (+) facial droop, Good concentration; generalized weakness R>L  CHEST/LUNG: Clear to auscultation bilaterally; No rales, rhonchi, wheezing, or rubs  HEART: Regular rate and rhythm; No murmurs, rubs, or gallops  ABDOMEN: Soft, Nontender, Nondistended; Bowel sounds present  EXTREMITIES:  2+ Peripheral Pulses, No clubbing, cyanosis, or edema                              10.3   6.8   )-----------( 292      ( 01 Aug 2018 08:06 )             31.8     01 Aug 2018 08:06    143    |  104    |  15.0   ----------------------------<  97     4.0     |  26.0   |  0.47     Ca    9.6        01 Aug 2018 08:06  Mg     2.0       01 Aug 2018 08:06        CAPILLARY BLOOD GLUCOSE  POCT Blood Glucose.: 204 mg/dL (02 Aug 2018 09:40)  POCT Blood Glucose.: 143 mg/dL (02 Aug 2018 08:15)  POCT Blood Glucose.: 339 mg/dL (01 Aug 2018 22:23)  POCT Blood Glucose.: 253 mg/dL (01 Aug 2018 17:42)  POCT Blood Glucose.: 176 mg/dL (01 Aug 2018 11:47)      Urinalysis Basic - ( 01 Aug 2018 11:14 )    Color: Yellow / Appearance: Clear / S.010 / pH: x  Gluc: x / Ketone: Negative  / Bili: Negative / Urobili: Negative mg/dL   Blood: x / Protein: Negative mg/dL / Nitrite: Negative   Leuk Esterase: Negative / RBC: x / WBC x   Sq Epi: x / Non Sq Epi: x / Bacteria: x    Culture - Urine (18 @ 11:15)    Specimen Source: .Urine Clean Catch (Midstream)    Culture Results:   No growth        Hemoglobin A1C, Whole Blood: 7.8 % (18 @ 08:38)    MEDICATIONS  (STANDING):  amLODIPine   Tablet 10 milliGRAM(s) Oral daily  artificial  tears Solution 1 Drop(s) Both EYES two times a day  aspirin enteric coated 81 milliGRAM(s) Oral daily  cloNIDine 0.2 milliGRAM(s) Oral two times a day  dextrose 5%. 1000 milliLiter(s) (50 mL/Hr) IV Continuous <Continuous>  dextrose 50% Injectable 12.5 Gram(s) IV Push once  dextrose 50% Injectable 25 Gram(s) IV Push once  dextrose 50% Injectable 25 Gram(s) IV Push once  famotidine    Tablet 20 milliGRAM(s) Oral daily  gabapentin 100 milliGRAM(s) Oral three times a day  heparin  Injectable 5000 Unit(s) SubCutaneous every 12 hours  insulin glargine Injectable (LANTUS) 20 Unit(s) SubCutaneous every morning  insulin glargine Injectable (LANTUS) 12 Unit(s) SubCutaneous at bedtime  insulin lispro (HumaLOG) corrective regimen sliding scale   SubCutaneous three times a day before meals  losartan 50 milliGRAM(s) Oral daily  polyethylene glycol 3350 17 Gram(s) Oral daily    MEDICATIONS  (PRN):  acetaminophen   Tablet 650 milliGRAM(s) Oral every 6 hours PRN Pain, fever, headache  dextrose 40% Gel 15 Gram(s) Oral once PRN Blood Glucose LESS THAN 70 milliGRAM(s)/deciliter  glucagon  Injectable 1 milliGRAM(s) IntraMuscular once PRN Glucose LESS THAN 70 milligrams/deciliter

## 2018-08-02 NOTE — DIETITIAN INITIAL EVALUATION ADULT. - OTHER INFO
Pt reports good po intake at meals- 100% consumed at breakfast per tray observation.  Pt requires tray set up, but was able to consume meal on her own per RN.

## 2018-08-02 NOTE — PROGRESS NOTE ADULT - ASSESSMENT
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  In the ED, Head CT was negative.  Patient admitted under medicine under impression of stroke Vs TIA. 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 Elevated velocities in the left internal carotid artery, probably due to tortuosity, Vertebral arteries: Antegrade flow, TTE done showed Left ventricular ejection fraction, by visual estimation, is >75%,  Spectral Doppler shows impaired relaxation pattern of left ventricular myocardial filling (Grade I diastolic dysfunction), Thickening of the anterior and posterior mitral valve leaflets, Trace tricuspid regurgitation, Sclerotic aortic valve with normal opening. Strand like mobile Echo densities attached to the tips of aortic valve leaflets. S/p CHRISTOPHER 7/31/18 with calcified echo density attached to left coronary cusp of aortic valve - recommended repeat CHRISTOPHER in 3-6 months, TSH WNL, and Hba1c 7.8, PT and OT consulted - rec FAM, speech swallow appreciated, diet changed to Puree with thin liquids.         Plan:     TIA/Metabolic encephalopathy:   Unclear etiology - Will check UA and Urine cx.  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  Carotid duplex showed  no evidence of hemodynamically significant stenosis; elevated velocities in the left internal carotid artery, probably due to tortuosity, Vertebral arteries: Antegrade flow.   TTE done showed EF >75%, Grade I diastolic dysfunction. Strand like mobile Echo densities attached to the tips of aortic valve leaflets. S/p CHRISTOPHER 7/31/18 with calcified echo density attached to left coronary cusp of aortic valve - recommended repeat CHRISTOPHER in 3-6 months . Cardiology and ID consulted.  Follow up Blood cultures.    TSH WNL  Hba1c 7.8  PT and OT consulted - recommended FAM  Speech and swallow appreciated, diet changed to Puree with thin liquids, tolerating.     Bradycardia: Asymptomatic, Metoprolol discontinued.  Monitor on cardiac monitor.    DM: FS monitoring and coverage insulin, she takes 30units in am and 10 units a bedtime at home, currently on 20units in the morning and 12units at night, Hb a1c is 7.8    HTN: Continue Clonidine, Amlodipine, and Losartan with parameters. Metoprolol discontinued secondary to bradycardia.       GERD: Pepcid    DVT prophylaxis: Heparin SC.     Code status: patient is full code.
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  In the ED, Head CT was negative.  Patient admitted under medicine under impression of stroke Vs TIA. 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 Elevated velocities in the left internal carotid artery, probably due to tortuosity, Vertebral arteries: Antegrade flow, TTE done showed Left ventricular ejection fraction, by visual estimation, is >75%,  Spectral Doppler shows impaired relaxation pattern of left ventricular myocardial filling (Grade I diastolic dysfunction), Thickening of the anterior and posterior mitral valve leaflets, Trace tricuspid regurgitation, Sclerotic aortic valve with normal opening. Strand like mobile Echo densities attached to the tips of aortic valve leaflets. S/p CHRISTOPHER 7/31/18 - awaiting report, TSH WNL, and Hba1c 7.8, PT and OT consulted - rec FAM, speech swallow appreciated, diet changed to Puree with thin liquids.         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  Carotid duplex showed  no evidence of hemodynamically significant stenosis; elevated velocities in the left internal carotid artery, probably due to tortuosity, Vertebral arteries: Antegrade flow.   TTE done showed EF >75%, Grade I diastolic dysfunction. Strand like mobile Echo densities attached to the tips of aortic valve leaflets. S/p CHRISTOPHER 7/31/18 - awaiting report. Cardiology and ID consulted.  Follow up Blood cultures.    TSH WNL  Hba1c 7.8  PT and OT consulted - recommended FAM  Speech and swallow appreciated, diet changed to Puree with thin liquids, tolerating.     Bradycardia: Asymptomatic, Metoprolol discontinued.  Monitor on cardiac monitor.    DM: FS monitoring and coverage insulin, she takes 30units in am and 10 units a bedtime at home, currently on 20units in the morning and 12units at night, Hb a1c is 7.8    HTN: Continue Clonidine, Amlodipine, and Losartan with parameters. Metoprolol discontinued secondary to bradycardia.       GERD: Pepcid    DVT prophylaxis: Heparin SC.     Code status: patient is full code.
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  In the ED, Head CT was negative.  Patient admitted under medicine under impression of stroke Vs TIA. 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 Elevated velocities in the left internal carotid artery, probably due to tortuosity, Vertebral arteries: Antegrade flow, TTE done showed Left ventricular ejection fraction, by visual estimation, is >75%,  Spectral Doppler shows impaired relaxation pattern of left ventricular myocardial filling (Grade I diastolic dysfunction), Thickening of the anterior and posterior mitral valve leaflets, Trace tricuspid regurgitation, Sclerotic aortic valve with normal opening. Strand like mobile Echo densities attached to the tips of aortic valve leaflets. S/p CHRISTOPHER 7/31/18 with calcified echo density attached to left coronary cusp of aortic valve - recommended repeat CHRISTOPHER in 3-6 months, TSH WNL, and Hba1c 7.8, PT and OT consulted - rec FAM, speech swallow appreciated, diet changed to Puree with thin liquids.         Plan:     TIA/Metabolic encephalopathy:   Unclear etiology - UA and Urine cx negative.  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  Carotid duplex showed  no evidence of hemodynamically significant stenosis; elevated velocities in the left internal carotid artery, probably due to tortuosity, Vertebral arteries: Antegrade flow.   TTE done showed EF >75%, Grade I diastolic dysfunction. Strand like mobile Echo densities attached to the tips of aortic valve leaflets. S/p CHRISTOPHER 7/31/18 with calcified echo density attached to left coronary cusp of aortic valve - recommended repeat CHRISTOPHER in 3-6 months . Cardiology and ID consulted.  Follow up Blood cultures.    TSH WNL  Hba1c 7.8  PT and OT consulted - recommended FAM  Speech and swallow appreciated, diet changed to Puree with thin liquids, tolerating.     Bradycardia: Asymptomatic, Metoprolol discontinued.  Monitor on cardiac monitor.    DM: FS monitoring and coverage insulin, she takes 30units in am and 10 units a bedtime at home, currently on 20units in the morning and 12units at night, Hb a1c is 7.8    HTN: Continue Clonidine, Amlodipine, and Losartan with parameters. Metoprolol discontinued secondary to bradycardia.       GERD: Pepcid    DVT prophylaxis: Heparin SC.     Code status: patient is full code.
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  In the ED, Head CT was negative.  Patient admitted under medicine under impression of stroke Vs TIA. 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 Elevated velocities in the left internal carotid artery, probably due to tortuosity, Vertebral arteries: Antegrade flow, TTE done showed Left ventricular ejection fraction, by visual estimation, is >75%,  Spectral Doppler shows impaired relaxation pattern of left ventricular myocardial filling (Grade I diastolic dysfunction), Thickening of the anterior and posterior mitral valve leaflets, Trace tricuspid regurgitation, Sclerotic aortic valve with normal opening. Strand like mobile Echo densities attached to the tips of aortic valve leaflets. Recommend CHRISTOPHER, TSH WNL, and Hba1c 7.8, PT and OT consulted, speech swallow appreciated, diet changed to Puree with thin liquids.         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  Carotid duplex showed  no evidence of hemodynamically significant stenosis; elevated velocities in the left internal carotid artery, probably due to tortuosity, Vertebral arteries: Antegrade flow.   TTE done showed EF >75%, Grade I diastolic dysfunction. Strand like mobile Echo densities attached to the tips of aortic valve leaflets. Recommend CHRISTOPHER. Cardiology and ID consulted.  Blood cultures drawn.  Will need CHRISTOPHER.  TSH WNL  Hba1c 7.8  PT and OT consulted  Speech and swallow appreciated, diet changed to Puree with thin liquids, tolerating.     DM: FS monitoring and coverage insulin, she takes 30units in am and 10 units a bedtime at home, currently on 20units in the morning and 10units at night, Hb a1c is 7.8    HTN: Continue Clonidine, Amlodipine, Losartan and Metoprolol with parameters.       GERD: Pepcid    DVT prophylaxis: Heparin SC.     Code status: patient is full code.
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   In the ED patient has head ct negative, symptoms are still persistent, evaluated by tele stroke neurology from [facility] per er physician  not considered iv tpa candidate or candidate for tertiary endovascular intervention.   Patient admitted under medicine under impression of stroke Vs TIA, Neuro checks no worsening, changed neuro checks to Q4, started on aspirin 81mg daily and atorvastatin, LDL obtain and is 20mg, discontinue statins, Neurology consult appreciated, MRI, MRA pending, Carotid duplex done showed  Right carotid artery: No evidence of hemodynamically significant stenosis,  Left carotid artery: No evidence of hemodynamically significant stenosis. Elevated velocities in the left internal carotid artery, probably due to tortuosity, Vertebral arteries: Antegrade flow, TTE done showed Left ventricular ejection fraction, by visual estimation, is >75%,  Spectral Doppler shows impaired relaxation pattern of left ventricular myocardial filling (Grade I diastolic dysfunction), Thickening of the anterior and posterior mitral valve leaflets, Trace tricuspid regurgitation, Sclerotic aortic valve with normal opening. Strand like mobile Echo densities attached to the tips of aortic valve leaflets. Recommend CHRISTOPHER, TSH WNL, and Hba1c 7.8, PT and OT consulted, speech swallow appreciated, diet changed, MRA and MRI pending, will follow.   patient has Hx of DM: FS monitoring and coverage insulin, she takes 30units in am and 10 units a bedtime, on 20units in the morning and 10 at night, will change at home dose upon discharge, her Hb a1c is 7.8      Plan:     Stroke Vs TIA: Neuro checks no worsening, changed neuro checks to Q4, started on aspirin 81mg daily and atorvastatin, LDL obtain and is 20mg, will discontinue statins,  Neurology consult appreciated, MRI, MRA pending, Carotid duplex  done showed  Right carotid artery: No evidence of hemodynamically significant stenosis,  Left carotid artery: No evidence of hemodynamically significant stenosis. Elevated velocities in the left internal carotid artery, probably due to tortuosity, Vertebral arteries: Antegrade flow, TTE done showed Left ventricular ejection fraction, by visual estimation, is >75%,  Spectral Doppler shows impaired relaxation pattern of left ventricular myocardial filling (Grade I diastolic dysfunction), Thickening of the anterior and posterior mitral valve leaflets, Trace tricuspid regurgitation, Sclerotic aortic valve with normal opening. Strand like mobile Echo densities attached to the tips of aortic valve leaflets. Recommend CHRISTOPHER, TSH WNL, and Hba1c 7.8, PT and OT consulted, speech swallow appreciated, diet changed, MRI and MRA pending.     DM: FS monitoring and coverage insulin, she takes 30units in am and 10 units a bedtime, on 20units in the morning and 10 at night, Hb a1c is 7.8    HTN: Resumed home meds, will hold if patient BP is <130 systolic.      GERD: Protonix    DVT prophylaxis: Heparin SC.     Code status: patient is full code.
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, patient is poor historian, most of the info is from the chart and SON, she is residing at [facility], earlier today noted to more pronounced face asymmetry and some confusion and they brought her here, as per son, he has been told she has [place] palsy and since last novemeber she has been having right sided weakness, patient denies having pain, chest pain, sob, limited Hx as patient is poor historian. PT DENIES FEVERS OR WT LOSS   ECHO WITH ? VEG ASKED    CHRISTOPHER NEG FOR VEG CALCIFIED AREA  BLOOD CX NEG  UNLIKELY  ENDOCARDITIS DEFER ABX WILL FOLLOW UP AS NEEDED
Assessment: Given above info, patient is having stroke Vs TIA    Plan:     Stroke Vs TIA: Neuro checks no worsening, will change neurochecks to Q4, started on aspirin 81mg daily and atorvastatin, LDL obtain and is 20mg, will discontinue statins,  Neurology consult appreciated, will get MRI, MRA, Carotid duplex and TTE, TSH WNL, and Hba1c 7.8, will monitor patient closely, will get PT and OT, speech swallow appreciated, diet changed.     DM: FS monitoring and coverage insulin, she takes 30units in am and 10 units a bedtime, will give 20units in the morning and 10 at night.      HTN: Will resume home meds, will hold if patient BP is <130 systolic.      GERD: Protonix    DVT prophylaxis: Heparin SC.     Code status: patient is full code.
Stroke - ? lacunar, prior CNS hemorrhage; multiple risk factors and advanced age

## 2018-08-02 NOTE — PROGRESS NOTE ADULT - PROVIDER SPECIALTY LIST ADULT
Hospitalist
Infectious Disease
Neurology
Neurology
Hospitalist

## 2018-08-04 LAB
CULTURE RESULTS: SIGNIFICANT CHANGE UP
CULTURE RESULTS: SIGNIFICANT CHANGE UP
SPECIMEN SOURCE: SIGNIFICANT CHANGE UP
SPECIMEN SOURCE: SIGNIFICANT CHANGE UP

## 2018-08-21 PROCEDURE — 85610 PROTHROMBIN TIME: CPT

## 2018-08-21 PROCEDURE — 81003 URINALYSIS AUTO W/O SCOPE: CPT

## 2018-08-21 PROCEDURE — 70551 MRI BRAIN STEM W/O DYE: CPT

## 2018-08-21 PROCEDURE — 84484 ASSAY OF TROPONIN QUANT: CPT

## 2018-08-21 PROCEDURE — 70544 MR ANGIOGRAPHY HEAD W/O DYE: CPT

## 2018-08-21 PROCEDURE — 70450 CT HEAD/BRAIN W/O DYE: CPT

## 2018-08-21 PROCEDURE — 92610 EVALUATE SWALLOWING FUNCTION: CPT

## 2018-08-21 PROCEDURE — 97167 OT EVAL HIGH COMPLEX 60 MIN: CPT

## 2018-08-21 PROCEDURE — 80061 LIPID PANEL: CPT

## 2018-08-21 PROCEDURE — 80053 COMPREHEN METABOLIC PANEL: CPT

## 2018-08-21 PROCEDURE — 93325 DOPPLER ECHO COLOR FLOW MAPG: CPT

## 2018-08-21 PROCEDURE — 87086 URINE CULTURE/COLONY COUNT: CPT

## 2018-08-21 PROCEDURE — 80048 BASIC METABOLIC PNL TOTAL CA: CPT

## 2018-08-21 PROCEDURE — 85027 COMPLETE CBC AUTOMATED: CPT

## 2018-08-21 PROCEDURE — 36415 COLL VENOUS BLD VENIPUNCTURE: CPT

## 2018-08-21 PROCEDURE — 93320 DOPPLER ECHO COMPLETE: CPT

## 2018-08-21 PROCEDURE — 87040 BLOOD CULTURE FOR BACTERIA: CPT

## 2018-08-21 PROCEDURE — 99291 CRITICAL CARE FIRST HOUR: CPT

## 2018-08-21 PROCEDURE — 82962 GLUCOSE BLOOD TEST: CPT

## 2018-08-21 PROCEDURE — T1013: CPT

## 2018-08-21 PROCEDURE — 85730 THROMBOPLASTIN TIME PARTIAL: CPT

## 2018-08-21 PROCEDURE — 93306 TTE W/DOPPLER COMPLETE: CPT

## 2018-08-21 PROCEDURE — 84443 ASSAY THYROID STIM HORMONE: CPT

## 2018-08-21 PROCEDURE — 93312 ECHO TRANSESOPHAGEAL: CPT

## 2018-08-21 PROCEDURE — 83735 ASSAY OF MAGNESIUM: CPT

## 2018-08-21 PROCEDURE — 93880 EXTRACRANIAL BILAT STUDY: CPT

## 2018-08-21 PROCEDURE — 93005 ELECTROCARDIOGRAM TRACING: CPT

## 2018-08-21 PROCEDURE — 83036 HEMOGLOBIN GLYCOSYLATED A1C: CPT

## 2018-08-21 PROCEDURE — 97163 PT EVAL HIGH COMPLEX 45 MIN: CPT

## 2018-08-21 PROCEDURE — 92526 ORAL FUNCTION THERAPY: CPT

## 2018-09-11 ENCOUNTER — INPATIENT (INPATIENT)
Facility: HOSPITAL | Age: 83
LOS: 5 days | Discharge: NURSING, REHAB OR LONG-TERM CARE | DRG: 812 | End: 2018-09-17
Attending: HOSPITALIST | Admitting: HOSPITALIST
Payer: MEDICARE

## 2018-09-11 VITALS
WEIGHT: 139.99 LBS | DIASTOLIC BLOOD PRESSURE: 75 MMHG | SYSTOLIC BLOOD PRESSURE: 153 MMHG | TEMPERATURE: 99 F | HEART RATE: 70 BPM | OXYGEN SATURATION: 97 % | HEIGHT: 62 IN | RESPIRATION RATE: 17 BRPM

## 2018-09-11 LAB
ALBUMIN SERPL ELPH-MCNC: 3.5 G/DL — SIGNIFICANT CHANGE UP (ref 3.3–5.2)
ALP SERPL-CCNC: 86 U/L — SIGNIFICANT CHANGE UP (ref 40–120)
ALT FLD-CCNC: 9 U/L — SIGNIFICANT CHANGE UP
ANION GAP SERPL CALC-SCNC: 13 MMOL/L — SIGNIFICANT CHANGE UP (ref 5–17)
ANISOCYTOSIS BLD QL: SLIGHT — SIGNIFICANT CHANGE UP
APTT BLD: 25.9 SEC — LOW (ref 27.5–37.4)
AST SERPL-CCNC: 14 U/L — SIGNIFICANT CHANGE UP
BASOPHILS # BLD AUTO: 0 K/UL — SIGNIFICANT CHANGE UP (ref 0–0.2)
BASOPHILS NFR BLD AUTO: 0.3 % — SIGNIFICANT CHANGE UP (ref 0–2)
BILIRUB SERPL-MCNC: <0.2 MG/DL — LOW (ref 0.4–2)
BUN SERPL-MCNC: 13 MG/DL — SIGNIFICANT CHANGE UP (ref 8–20)
CALCIUM SERPL-MCNC: 9.1 MG/DL — SIGNIFICANT CHANGE UP (ref 8.6–10.2)
CHLORIDE SERPL-SCNC: 105 MMOL/L — SIGNIFICANT CHANGE UP (ref 98–107)
CO2 SERPL-SCNC: 25 MMOL/L — SIGNIFICANT CHANGE UP (ref 22–29)
CREAT SERPL-MCNC: 0.45 MG/DL — LOW (ref 0.5–1.3)
ELLIPTOCYTES BLD QL SMEAR: SLIGHT — SIGNIFICANT CHANGE UP
EOSINOPHIL # BLD AUTO: 0.1 K/UL — SIGNIFICANT CHANGE UP (ref 0–0.5)
EOSINOPHIL NFR BLD AUTO: 1.9 % — SIGNIFICANT CHANGE UP (ref 0–6)
GLUCOSE SERPL-MCNC: 216 MG/DL — HIGH (ref 70–115)
HCT VFR BLD CALC: 22.5 % — LOW (ref 37–47)
HGB BLD-MCNC: 6.7 G/DL — CRITICAL LOW (ref 12–16)
HYPOCHROMIA BLD QL: SLIGHT — SIGNIFICANT CHANGE UP
INR BLD: 0.99 RATIO — SIGNIFICANT CHANGE UP (ref 0.88–1.16)
LYMPHOCYTES # BLD AUTO: 2.2 K/UL — SIGNIFICANT CHANGE UP (ref 1–4.8)
LYMPHOCYTES # BLD AUTO: 32.4 % — SIGNIFICANT CHANGE UP (ref 20–55)
MACROCYTES BLD QL: SLIGHT — SIGNIFICANT CHANGE UP
MCHC RBC-ENTMCNC: 21.8 PG — LOW (ref 27–31)
MCHC RBC-ENTMCNC: 29.8 G/DL — LOW (ref 32–36)
MCV RBC AUTO: 73.1 FL — LOW (ref 81–99)
MICROCYTES BLD QL: SLIGHT — SIGNIFICANT CHANGE UP
MONOCYTES # BLD AUTO: 0.5 K/UL — SIGNIFICANT CHANGE UP (ref 0–0.8)
MONOCYTES NFR BLD AUTO: 7 % — SIGNIFICANT CHANGE UP (ref 3–10)
NEUTROPHILS # BLD AUTO: 3.9 K/UL — SIGNIFICANT CHANGE UP (ref 1.8–8)
NEUTROPHILS NFR BLD AUTO: 58.1 % — SIGNIFICANT CHANGE UP (ref 37–73)
OVALOCYTES BLD QL SMEAR: SLIGHT — SIGNIFICANT CHANGE UP
PLAT MORPH BLD: NORMAL — SIGNIFICANT CHANGE UP
PLATELET # BLD AUTO: 372 K/UL — SIGNIFICANT CHANGE UP (ref 150–400)
POIKILOCYTOSIS BLD QL AUTO: SLIGHT — SIGNIFICANT CHANGE UP
POLYCHROMASIA BLD QL SMEAR: SLIGHT — SIGNIFICANT CHANGE UP
POTASSIUM SERPL-MCNC: 4.2 MMOL/L — SIGNIFICANT CHANGE UP (ref 3.5–5.3)
POTASSIUM SERPL-SCNC: 4.2 MMOL/L — SIGNIFICANT CHANGE UP (ref 3.5–5.3)
PROT SERPL-MCNC: 6.7 G/DL — SIGNIFICANT CHANGE UP (ref 6.6–8.7)
PROTHROM AB SERPL-ACNC: 10.9 SEC — SIGNIFICANT CHANGE UP (ref 9.8–12.7)
RBC # BLD: 3.08 M/UL — LOW (ref 4.4–5.2)
RBC # FLD: 19.9 % — HIGH (ref 11–15.6)
RBC BLD AUTO: ABNORMAL
SCHISTOCYTES BLD QL AUTO: SLIGHT — SIGNIFICANT CHANGE UP
SODIUM SERPL-SCNC: 143 MMOL/L — SIGNIFICANT CHANGE UP (ref 135–145)
TARGETS BLD QL SMEAR: SLIGHT — SIGNIFICANT CHANGE UP
WBC # BLD: 6.8 K/UL — SIGNIFICANT CHANGE UP (ref 4.8–10.8)
WBC # FLD AUTO: 6.8 K/UL — SIGNIFICANT CHANGE UP (ref 4.8–10.8)

## 2018-09-11 PROCEDURE — 99291 CRITICAL CARE FIRST HOUR: CPT

## 2018-09-11 NOTE — ED PROVIDER NOTE - OBJECTIVE STATEMENT
82 y/o F with PMHx of DM, GERD, HLD, CVA and HTN presents to ED from [facility] for low H&H result. 84 y/o F with PMHx of DM, GERD, HLD, CVA and HTN presents to ED from [facility] for low H&H result. Patient is unable to provide detailed history due to residual deficits from past stroke.

## 2018-09-11 NOTE — ED ADULT NURSE NOTE - PMH
CVA (cerebral vascular accident)    Diabetes mellitus    GERD (gastroesophageal reflux disease)    High cholesterol    Hypertension

## 2018-09-11 NOTE — ED ADULT NURSE NOTE - INTERVENTIONS DEFINITIONS
[place] to call system/Physically safe environment: no spills, clutter or unnecessary equipment/Monitor for mental status changes and reorient to person, place, and time/Stretcher in lowest position, wheels locked, appropriate side rails in place/Call bell, personal items and telephone within reach

## 2018-09-11 NOTE — ED PROVIDER NOTE - PMH
Diabetes mellitus    GERD (gastroesophageal reflux disease)    High cholesterol    Hypertension CVA (cerebral vascular accident)    Diabetes mellitus    GERD (gastroesophageal reflux disease)    High cholesterol    Hypertension

## 2018-09-12 DIAGNOSIS — D64.9 ANEMIA, UNSPECIFIED: ICD-10-CM

## 2018-09-12 DIAGNOSIS — K21.9 GASTRO-ESOPHAGEAL REFLUX DISEASE WITHOUT ESOPHAGITIS: ICD-10-CM

## 2018-09-12 DIAGNOSIS — I10 ESSENTIAL (PRIMARY) HYPERTENSION: ICD-10-CM

## 2018-09-12 DIAGNOSIS — E11.9 TYPE 2 DIABETES MELLITUS WITHOUT COMPLICATIONS: ICD-10-CM

## 2018-09-12 DIAGNOSIS — Z71.89 OTHER SPECIFIED COUNSELING: ICD-10-CM

## 2018-09-12 DIAGNOSIS — Z29.9 ENCOUNTER FOR PROPHYLACTIC MEASURES, UNSPECIFIED: ICD-10-CM

## 2018-09-12 DIAGNOSIS — I63.9 CEREBRAL INFARCTION, UNSPECIFIED: ICD-10-CM

## 2018-09-12 LAB
ANION GAP SERPL CALC-SCNC: 12 MMOL/L — SIGNIFICANT CHANGE UP (ref 5–17)
BLD GP AB SCN SERPL QL: SIGNIFICANT CHANGE UP
BUN SERPL-MCNC: 11 MG/DL — SIGNIFICANT CHANGE UP (ref 8–20)
CALCIUM SERPL-MCNC: 9.5 MG/DL — SIGNIFICANT CHANGE UP (ref 8.6–10.2)
CHLORIDE SERPL-SCNC: 103 MMOL/L — SIGNIFICANT CHANGE UP (ref 98–107)
CO2 SERPL-SCNC: 25 MMOL/L — SIGNIFICANT CHANGE UP (ref 22–29)
CREAT SERPL-MCNC: 0.43 MG/DL — LOW (ref 0.5–1.3)
GLUCOSE BLDC GLUCOMTR-MCNC: 162 MG/DL — HIGH (ref 70–99)
GLUCOSE BLDC GLUCOMTR-MCNC: 230 MG/DL — HIGH (ref 70–99)
GLUCOSE BLDC GLUCOMTR-MCNC: 286 MG/DL — HIGH (ref 70–99)
GLUCOSE SERPL-MCNC: 181 MG/DL — HIGH (ref 70–115)
HCT VFR BLD CALC: 30.3 % — LOW (ref 37–47)
HGB BLD-MCNC: 9.9 G/DL — LOW (ref 12–16)
MCHC RBC-ENTMCNC: 24.3 PG — LOW (ref 27–31)
MCHC RBC-ENTMCNC: 32.7 G/DL — SIGNIFICANT CHANGE UP (ref 32–36)
MCV RBC AUTO: 74.4 FL — LOW (ref 81–99)
PLATELET # BLD AUTO: 361 K/UL — SIGNIFICANT CHANGE UP (ref 150–400)
POTASSIUM SERPL-MCNC: 4.4 MMOL/L — SIGNIFICANT CHANGE UP (ref 3.5–5.3)
POTASSIUM SERPL-SCNC: 4.4 MMOL/L — SIGNIFICANT CHANGE UP (ref 3.5–5.3)
RBC # BLD: 4.07 M/UL — LOW (ref 4.4–5.2)
RBC # FLD: 19.2 % — HIGH (ref 11–15.6)
SODIUM SERPL-SCNC: 140 MMOL/L — SIGNIFICANT CHANGE UP (ref 135–145)
TYPE + AB SCN PNL BLD: SIGNIFICANT CHANGE UP
WBC # BLD: 8.4 K/UL — SIGNIFICANT CHANGE UP (ref 4.8–10.8)
WBC # FLD AUTO: 8.4 K/UL — SIGNIFICANT CHANGE UP (ref 4.8–10.8)

## 2018-09-12 PROCEDURE — 99222 1ST HOSP IP/OBS MODERATE 55: CPT

## 2018-09-12 PROCEDURE — 12345: CPT | Mod: NC

## 2018-09-12 PROCEDURE — 99223 1ST HOSP IP/OBS HIGH 75: CPT

## 2018-09-12 RX ORDER — INFLUENZA VIRUS VACCINE 15; 15; 15; 15 UG/.5ML; UG/.5ML; UG/.5ML; UG/.5ML
0.5 SUSPENSION INTRAMUSCULAR ONCE
Qty: 0 | Refills: 0 | Status: DISCONTINUED | OUTPATIENT
Start: 2018-09-12 | End: 2018-09-17

## 2018-09-12 RX ORDER — LOSARTAN POTASSIUM 100 MG/1
1 TABLET, FILM COATED ORAL
Qty: 0 | Refills: 0 | COMMUNITY

## 2018-09-12 RX ORDER — FAMOTIDINE 10 MG/ML
1 INJECTION INTRAVENOUS
Qty: 0 | Refills: 0 | COMMUNITY

## 2018-09-12 RX ORDER — GABAPENTIN 400 MG/1
100 CAPSULE ORAL THREE TIMES A DAY
Qty: 0 | Refills: 0 | Status: DISCONTINUED | OUTPATIENT
Start: 2018-09-12 | End: 2018-09-17

## 2018-09-12 RX ORDER — GABAPENTIN 400 MG/1
1 CAPSULE ORAL
Qty: 0 | Refills: 0 | COMMUNITY

## 2018-09-12 RX ORDER — ERYTHROPOIETIN 10000 [IU]/ML
0 INJECTION, SOLUTION INTRAVENOUS; SUBCUTANEOUS
Qty: 0 | Refills: 0 | COMMUNITY

## 2018-09-12 RX ORDER — DEXTROSE 50 % IN WATER 50 %
25 SYRINGE (ML) INTRAVENOUS ONCE
Qty: 0 | Refills: 0 | Status: DISCONTINUED | OUTPATIENT
Start: 2018-09-12 | End: 2018-09-17

## 2018-09-12 RX ORDER — DEXTROSE 50 % IN WATER 50 %
12.5 SYRINGE (ML) INTRAVENOUS ONCE
Qty: 0 | Refills: 0 | Status: DISCONTINUED | OUTPATIENT
Start: 2018-09-12 | End: 2018-09-17

## 2018-09-12 RX ORDER — PANTOPRAZOLE SODIUM 20 MG/1
40 TABLET, DELAYED RELEASE ORAL EVERY 12 HOURS
Qty: 0 | Refills: 0 | Status: DISCONTINUED | OUTPATIENT
Start: 2018-09-12 | End: 2018-09-14

## 2018-09-12 RX ORDER — DEXTROSE 50 % IN WATER 50 %
15 SYRINGE (ML) INTRAVENOUS ONCE
Qty: 0 | Refills: 0 | Status: DISCONTINUED | OUTPATIENT
Start: 2018-09-12 | End: 2018-09-17

## 2018-09-12 RX ORDER — GLUCAGON INJECTION, SOLUTION 0.5 MG/.1ML
1 INJECTION, SOLUTION SUBCUTANEOUS ONCE
Qty: 0 | Refills: 0 | Status: DISCONTINUED | OUTPATIENT
Start: 2018-09-12 | End: 2018-09-17

## 2018-09-12 RX ORDER — FAMOTIDINE 10 MG/ML
20 INJECTION INTRAVENOUS DAILY
Qty: 0 | Refills: 0 | Status: DISCONTINUED | OUTPATIENT
Start: 2018-09-12 | End: 2018-09-13

## 2018-09-12 RX ORDER — SODIUM CHLORIDE 9 MG/ML
1000 INJECTION, SOLUTION INTRAVENOUS
Qty: 0 | Refills: 0 | Status: DISCONTINUED | OUTPATIENT
Start: 2018-09-12 | End: 2018-09-17

## 2018-09-12 RX ORDER — INSULIN LISPRO 100/ML
VIAL (ML) SUBCUTANEOUS
Qty: 0 | Refills: 0 | Status: DISCONTINUED | OUTPATIENT
Start: 2018-09-12 | End: 2018-09-17

## 2018-09-12 RX ORDER — ACETAMINOPHEN 500 MG
2 TABLET ORAL
Qty: 0 | Refills: 0 | COMMUNITY

## 2018-09-12 RX ADMIN — FAMOTIDINE 20 MILLIGRAM(S): 10 INJECTION INTRAVENOUS at 11:35

## 2018-09-12 RX ADMIN — Medication 0.2 MILLIGRAM(S): at 06:23

## 2018-09-12 RX ADMIN — Medication 0.2 MILLIGRAM(S): at 18:05

## 2018-09-12 RX ADMIN — PANTOPRAZOLE SODIUM 40 MILLIGRAM(S): 20 TABLET, DELAYED RELEASE ORAL at 18:05

## 2018-09-12 RX ADMIN — GABAPENTIN 100 MILLIGRAM(S): 400 CAPSULE ORAL at 06:23

## 2018-09-12 RX ADMIN — Medication 2: at 08:24

## 2018-09-12 RX ADMIN — GABAPENTIN 100 MILLIGRAM(S): 400 CAPSULE ORAL at 22:36

## 2018-09-12 RX ADMIN — GABAPENTIN 100 MILLIGRAM(S): 400 CAPSULE ORAL at 11:35

## 2018-09-12 RX ADMIN — PANTOPRAZOLE SODIUM 40 MILLIGRAM(S): 20 TABLET, DELAYED RELEASE ORAL at 06:23

## 2018-09-12 RX ADMIN — Medication 3: at 18:05

## 2018-09-12 NOTE — H&P ADULT - NSHPPHYSICALEXAM_GEN_ALL_CORE
Vital Signs Last 24 Hrs  T(C): 37.2 (11 Sep 2018 21:07), Max: 37.2 (11 Sep 2018 21:07)  T(F): 99 (11 Sep 2018 21:07), Max: 99 (11 Sep 2018 21:07)  HR: 70 (11 Sep 2018 21:07) (70 - 70)  BP: 153/75 (11 Sep 2018 21:07) (153/75 - 153/75)  BP(mean): --  RR: 17 (11 Sep 2018 21:07) (17 - 17)  SpO2: 97% (11 Sep 2018 21:07) (97% - 97%) Vital Signs Last 24 Hrs  T(C): 37.2 (11 Sep 2018 21:07), Max: 37.2 (11 Sep 2018 21:07)  T(F): 99 (11 Sep 2018 21:07), Max: 99 (11 Sep 2018 21:07)  HR: 70 (11 Sep 2018 21:07) (70 - 70)  BP: 153/75 (11 Sep 2018 21:07) (153/75 - 153/75)  BP(mean): --  RR: 17 (11 Sep 2018 21:07) (17 - 17)  SpO2: 97% (11 Sep 2018 21:07) (97% - 97%)    GENERAL:  Well-appearing, not in acute distress  EYES:  Clear conjunctiva, pupils reactive to light  ENT: Moist mucous membranes  LUNG:  Non-labored breathing pattern, lungs clear to ausculation in anterior fields  CV: Regular rate and rhythm, no murmurs appreciated, no lower extremity edema  ABD: Soft, non-tender, non-distended  NEURO: Awake, alert, facial asymmetry, says few words in Kyrgyz, able to follow commands  PSYCH: Calm, cooperative  SKIN: No rash or lesions

## 2018-09-12 NOTE — CONSULT NOTE ADULT - SUBJECTIVE AND OBJECTIVE BOX
HPI:  82 y/o F, primarily English speaking, hx CVA, 11/17,  w/ residual deficits,Right hemiparesis.  Primarily bedbound.  PMH: HTN, DM2, HLD, Distant PUD by EGD 4 yrs ago. Recently admitted 6 weeks ago for unclear reason.  Neuro work up showed no new stroke.  Hgb at that time was 11.7 with low MCV of 75. Patient currently was sent from [facility] for low Hgb.  Pt was found to have Hgb of 6.6 on lab work done on 9/11 and was sent to ER for further eval.  Previous labs done on 9/4 showed Hg of 7.5 and she was started on epogen.  Pt has no known hx of active bleeding.  Son reports that her 'anemia is chronic' and she has required pRBC in the past.  He also reports that she had an endoscopy several years ago that showed an ulcer.  Son reports that pt baseline confusion after CVA but that she can participate in limited conversation.  No overt recent bleeding.  No BM's since in ER.  Pt just completed 2 units or PRBC's.  Tolerated well.  Pt does not speak english but is disoriented to place and time when spoken to in English.   Awaiting call back from daughter. Denies abdominal pain, fever or overt bleeding.  Med review:  Pt has been on chronic aspirin along with Famotidine.  No PPI on medex.          PAST MEDICAL & SURGICAL HISTORY:  CVA (cerebral vascular accident)  GERD (gastroesophageal reflux disease)  Diabetes mellitus  High cholesterol  Hypertension  No significant past surgical history      ROS:  No Heartburn, regurgitation, dysphagia, odynophagia.  No dyspepsia  No abdominal pain.    No Nausea, vomiting.  No Bleeding.  No hematemesis.   No diarrhea.    No hematochezia.  No weight loss, anorexia.  No edema.      MEDICATIONS  (STANDING):  cloNIDine 0.2 milliGRAM(s) Oral two times a day  dextrose 5%. 1000 milliLiter(s) (50 mL/Hr) IV Continuous <Continuous>  dextrose 50% Injectable 12.5 Gram(s) IV Push once  dextrose 50% Injectable 25 Gram(s) IV Push once  dextrose 50% Injectable 25 Gram(s) IV Push once  famotidine    Tablet 20 milliGRAM(s) Oral daily  gabapentin 100 milliGRAM(s) Oral three times a day  insulin lispro (HumaLOG) corrective regimen sliding scale   SubCutaneous three times a day before meals  pantoprazole  Injectable 40 milliGRAM(s) IV Push every 12 hours    MEDICATIONS  (PRN):  dextrose 40% Gel 15 Gram(s) Oral once PRN Blood Glucose LESS THAN 70 milliGRAM(s)/deciliter  glucagon  Injectable 1 milliGRAM(s) IntraMuscular once PRN Glucose LESS THAN 70 milligrams/deciliter      Allergies    No Known Allergies    Intolerances        SOCIAL HISTORY:    FAMILY HISTORY:  No pertinent family history in first degree relatives: No known hx of HTN or DM      Vital Signs Last 24 Hrs  T(C): 37.1 (12 Sep 2018 08:19), Max: 37.2 (11 Sep 2018 21:07)  T(F): 98.8 (12 Sep 2018 08:19), Max: 99 (11 Sep 2018 21:07)  HR: 60 (12 Sep 2018 08:19) (60 - 81)  BP: 141/64 (12 Sep 2018 08:19) (138/66 - 176/76)  BP(mean): --  RR: 18 (12 Sep 2018 08:19) (16 - 18)  SpO2: 99% (12 Sep 2018 08:19) (97% - 100%)    PHYSICAL EXAM:    GENERAL: NAD, well-groomed, well-developed  HEAD:  Atraumatic, Normocephalic  EYES: EOMI, PERRLA, conjunctiva and sclera clear  ENMT: No tonsillar erythema, exudates, or enlargement; Moist mucous membranes, Good dentition, No lesions  NECK: Supple, No JVD, Normal thyroid  CHEST/LUNG: Clear to percussion bilaterally; No rales, rhonchi, wheezing, or rubs  HEART: Regular rate and rhythm; No murmurs, rubs, or gallops  ABDOMEN: Soft, Nontender, Nondistended; Bowel sounds present.  No Scars.  No HSM.  No MTR.  Unable to perform STEPHANIE:  Hallway in ED.   EXTREMITIES:  2+ Peripheral Pulses, No clubbing, cyanosis, or edema  LYMPH: No lymphadenopathy noted  SKIN: No rashes or lesions      LABS:                        6.7    6.8   )-----------( 372      ( 11 Sep 2018 23:09 )             22.5     09-11    143  |  105  |  13.0  ----------------------------<  216<H>  4.2   |  25.0  |  0.45<L>    Ca    9.1      11 Sep 2018 23:09    TPro  6.7  /  Alb  3.5  /  TBili  <0.2<L>  /  DBili  x   /  AST  14  /  ALT  9   /  AlkPhos  86  09-11    PT/INR - ( 11 Sep 2018 23:09 )   PT: 10.9 sec;   INR: 0.99 ratio         PTT - ( 11 Sep 2018 23:09 )  PTT:25.9 sec       LIVER FUNCTIONS - ( 11 Sep 2018 23:09 )  Alb: 3.5 g/dL / Pro: 6.7 g/dL / ALK PHOS: 86 U/L / ALT: 9 U/L / AST: 14 U/L / GGT: x           Fe 14;  % Sat: 4;  Ferritin 9,  all low.   EKG:  NSR normal.   RADIOLOGY & ADDITIONAL STUDIES:

## 2018-09-12 NOTE — ED ADULT NURSE REASSESSMENT NOTE - NS ED NURSE REASSESS COMMENT FT1
Pt resting comfortably on stretcher.  No complaints of pain.  Respirations even and unlabored.  Awaiting bed placement.  PIV wnl; flushing without difficulty.  In NAD, will continue to monitor.
Pt resting comfortably on stretcher.  No complaints of pain.  Respirations even and unlabored.  Tolerating 2nd unit prbc well.  Awaiting bed placement.  PIV wnl; flushing without difficulty.  In NAD, will continue to monitor.
Pt. tolerating transfusion of PRBC well; no adverse reaction noted; VSS.  Warm blanket provided to pt. In NAD, Will continue to monitor.
pt in no apparent distress, resting comfortably in bed, denies nay pain at the moment. IV patent and flushing without difficulty, no signs of infiltration. plan of care explained to pt, pt verbalized understanding.

## 2018-09-12 NOTE — H&P ADULT - PROBLEM SELECTOR PLAN 1
Acute on chronic microcytic anemia with no known evidence of active bleeding. Given hx of prior ulcer, suspect GI related bleeding.  -Plan for 2pRBC in AM  -Post transfusion CBC at 9AM then trend daily  -Active type and screen  -IV protonix  -GI consult in AM (consent for any procedures should be obtained from daughter (contact information above in H&P)  -NPO until GI assessment Acute on chronic microcytic anemia with no known evidence of active bleeding. Given hx of prior ulcer, suspect GI related bleeding.  -Plan for 2pRBC in AM  -Post transfusion CBC at 9AM then trend daily  -Active type and screen  -IV protonix  -GI consult in AM (consent for any procedures should be obtained from daughter (contact information above in H&P as [place] be [place])  -NPO until GI assessment  -Holding epogen as unclear indication without CKD hx Acute on chronic microcytic anemia with no known evidence of active bleeding. Given hx of prior ulcer, suspect GI related bleeding.  -Plan for 2pRBC in ED  -Post transfusion CBC at 9AM then trend daily  -Active type and screen  -IV protonix  -GI consult called for routine eval in AM (consent for any procedures should be obtained from daughter (contact information above in H&P as son will be out of country)  -NPO until GI assessment  -Holding epogen as unclear indication without CKD hx

## 2018-09-12 NOTE — H&P ADULT - ASSESSMENT
82 y/o F, primarily Yi speaking, hx CVA w/ residual deficits, primarily bedbound, HTN, who was sent from [facility] for low Hgb of 6.6

## 2018-09-12 NOTE — H&P ADULT - NSHPLABSRESULTS_GEN_ALL_CORE
6.7    6.8   )-----------( 372      ( 11 Sep 2018 23:09 )             22.5       09-11    143  |  105  |  13.0  ----------------------------<  216<H>  4.2   |  25.0  |  0.45<L>    Ca    9.1      11 Sep 2018 23:09    TPro  6.7  /  Alb  3.5  /  TBili  <0.2<L>  /  DBili  x   /  AST  14  /  ALT  9   /  AlkPhos  86  09-11

## 2018-09-12 NOTE — H&P ADULT - HISTORY OF PRESENT ILLNESS
84 y/o F, primarily [place] speaking, hx CVA w/ residual deficits, primarily bedbound, HTN, who was sent from [facility] for low Hgb.  Son at bedside assisted in hx and rehab documents also reviewed for collateral  Pt was found to have Hgb of 6.6 on lab work done on 9/11 and was sent to ER for further eval.  Previous labs done on 9/4 showed Hg of 7.5 and she was started on epogen.  Pt has no known hx of active bleeding.  Son reports that her 'anemia is chronic' and she has required pRBC in the past.  He also reports that she had an endoscopy several years ago that showed an ulcer.  Son reports that pt baseline confusion after CVA but that she can participate in limited conversation.

## 2018-09-12 NOTE — H&P ADULT - PROBLEM SELECTOR PLAN 2
Hx of hemorrhagic stroke in 11/2017, currently primarily bedbound.  -Normally on pureed diet, but NPO until GI eval Hx of hemorrhagic stroke in 11/2017, currently primarily bedbound.  -Bed alarm, fall risk protocol  -Aspiration precautions  -Normally on pureed diet, but NPO until GI eval

## 2018-09-12 NOTE — CONSULT NOTE ADULT - ASSESSMENT
Iron deficiency anemia with gradual drop in Hgb over past 6 weeks.  NH patient due to dense right hemiparesis, residua of prior distant hemorrhagic stroke in 11/2017.  Moderate debilitation.  Possible PUD due to  continued ASA use.  Pepcid offers no prophylaxis against ulcerogenic effect of aspirin.    Suggest restart Pureed diet, DASH c CHO restriction.    Potential candidate for EGD/ Colonoscopy if family agrees. Would dc Famotidine and use PPI along with chronic aspirin.  Transfuse to hgb> 8 given prior CVA.

## 2018-09-12 NOTE — PATIENT PROFILE ADULT. - URINARY CATHETER
PREOPERATIVE DIAGNOSIS:    Basal cell carcinoma of scalp     POSTOPERATIVE DIAGNOSIS:    Basal cell carcinoma of scalp     PROCEDURE:    Excision of 1.6 cm x 1.4 cm basal cell carcinoma of the scalp.    SURGEON:  Flakito Laws M.D.  ASSISTANT:  None.  ANESTHESIA:  MAC.   BLOOD LOSS: minimal  COUNTS:  Needle and sponge count correct.    SPECIMENS:    Order Name Source Comment Collection Info Order Time   TISSUE PATHOLOGY EXAM Scalp  Collected By: Flakito Laws MD 5/14/2018 11:16 AM       INDICATIONS FOR OPERATION: Lalo Gayle is a 69 y.o. man who has a basal cell cancer of the scalp.       OPERATION:  The patient was brought to the operating room in stable condition.  Perioperative antibiotics were given. Sequential compression stockings were in place on the bilateral lower extremities. The patient was positioned supine on the operating room table.  MAC anesthesia was achievedwithout difficulty. A surgical pause was performed.    I used a 15 scalpel to excise the lesion with a margin of a few millimeters. I went through the [place], into the loose areolar tissue.  I marked the specimen with a suture placed on the anterior edge.  I raised flaps extending about 6 cm in all directions. I was able to close the defect primarily with 2-0 nylon and 3-0 nylon simple stitches.    He tolerated the procedure very well, and is transported to the recovery room in stable condition.    
no

## 2018-09-12 NOTE — H&P ADULT - PROBLEM SELECTOR PLAN 4
Hypertensive on admission  -Clonidine resumed to prevent reflex hypertension  -Held amlodipine and losartan on admission given concern for GIB, can re-instate as clinically indicated Hypertensive on admission  -Clonidine resumed to prevent reflex hypertension  -Also on amlodipine and losartan but held to not resume multiple BP agents at once given concern for GIB, would trend BP first then resume as clinically indicated Hypertensive on admission  -Clonidine resumed to prevent reflex hypertension  -Also on amlodipine and losartan but held as to not resume multiple BP agents at once given concern for GIB, would trend BP first then resume as clinically indicated

## 2018-09-12 NOTE — CHART NOTE - NSCHARTNOTEFT_GEN_A_CORE
83 year old female admitted with anemia - unclear etiology  H&P reviewed, patient seen and examined at bedside earlier today with   Confused, unclear baseline. Will follow

## 2018-09-13 ENCOUNTER — TRANSCRIPTION ENCOUNTER (OUTPATIENT)
Age: 83
End: 2018-09-13

## 2018-09-13 DIAGNOSIS — I10 ESSENTIAL (PRIMARY) HYPERTENSION: ICD-10-CM

## 2018-09-13 DIAGNOSIS — E11.65 TYPE 2 DIABETES MELLITUS WITH HYPERGLYCEMIA: ICD-10-CM

## 2018-09-13 DIAGNOSIS — K21.9 GASTRO-ESOPHAGEAL REFLUX DISEASE WITHOUT ESOPHAGITIS: ICD-10-CM

## 2018-09-13 DIAGNOSIS — D50.9 IRON DEFICIENCY ANEMIA, UNSPECIFIED: ICD-10-CM

## 2018-09-13 LAB
ANION GAP SERPL CALC-SCNC: 14 MMOL/L — SIGNIFICANT CHANGE UP (ref 5–17)
BUN SERPL-MCNC: 18 MG/DL — SIGNIFICANT CHANGE UP (ref 8–20)
CALCIUM SERPL-MCNC: 9.8 MG/DL — SIGNIFICANT CHANGE UP (ref 8.6–10.2)
CHLORIDE SERPL-SCNC: 103 MMOL/L — SIGNIFICANT CHANGE UP (ref 98–107)
CO2 SERPL-SCNC: 25 MMOL/L — SIGNIFICANT CHANGE UP (ref 22–29)
CREAT SERPL-MCNC: 0.57 MG/DL — SIGNIFICANT CHANGE UP (ref 0.5–1.3)
GLUCOSE BLDC GLUCOMTR-MCNC: 181 MG/DL — HIGH (ref 70–99)
GLUCOSE BLDC GLUCOMTR-MCNC: 233 MG/DL — HIGH (ref 70–99)
GLUCOSE BLDC GLUCOMTR-MCNC: 272 MG/DL — HIGH (ref 70–99)
GLUCOSE BLDC GLUCOMTR-MCNC: 298 MG/DL — HIGH (ref 70–99)
GLUCOSE SERPL-MCNC: 257 MG/DL — HIGH (ref 70–115)
HCT VFR BLD CALC: 30.9 % — LOW (ref 37–47)
HCT VFR BLD CALC: 33.5 % — LOW (ref 37–47)
HGB BLD-MCNC: 10.2 G/DL — LOW (ref 12–16)
HGB BLD-MCNC: 10.5 G/DL — LOW (ref 12–16)
MCHC RBC-ENTMCNC: 23.8 PG — LOW (ref 27–31)
MCHC RBC-ENTMCNC: 24.5 PG — LOW (ref 27–31)
MCHC RBC-ENTMCNC: 31.3 G/DL — LOW (ref 32–36)
MCHC RBC-ENTMCNC: 33 G/DL — SIGNIFICANT CHANGE UP (ref 32–36)
MCV RBC AUTO: 74.1 FL — LOW (ref 81–99)
MCV RBC AUTO: 76 FL — LOW (ref 81–99)
OB PNL STL: POSITIVE
PLATELET # BLD AUTO: 314 K/UL — SIGNIFICANT CHANGE UP (ref 150–400)
PLATELET # BLD AUTO: 383 K/UL — SIGNIFICANT CHANGE UP (ref 150–400)
POTASSIUM SERPL-MCNC: 3.7 MMOL/L — SIGNIFICANT CHANGE UP (ref 3.5–5.3)
POTASSIUM SERPL-SCNC: 3.7 MMOL/L — SIGNIFICANT CHANGE UP (ref 3.5–5.3)
RBC # BLD: 4.17 M/UL — LOW (ref 4.4–5.2)
RBC # BLD: 4.41 M/UL — SIGNIFICANT CHANGE UP (ref 4.4–5.2)
RBC # FLD: 19.2 % — HIGH (ref 11–15.6)
RBC # FLD: 19.6 % — HIGH (ref 11–15.6)
SODIUM SERPL-SCNC: 142 MMOL/L — SIGNIFICANT CHANGE UP (ref 135–145)
WBC # BLD: 7.9 K/UL — SIGNIFICANT CHANGE UP (ref 4.8–10.8)
WBC # BLD: 8.8 K/UL — SIGNIFICANT CHANGE UP (ref 4.8–10.8)
WBC # FLD AUTO: 7.9 K/UL — SIGNIFICANT CHANGE UP (ref 4.8–10.8)
WBC # FLD AUTO: 8.8 K/UL — SIGNIFICANT CHANGE UP (ref 4.8–10.8)

## 2018-09-13 PROCEDURE — 99233 SBSQ HOSP IP/OBS HIGH 50: CPT

## 2018-09-13 PROCEDURE — 99222 1ST HOSP IP/OBS MODERATE 55: CPT

## 2018-09-13 PROCEDURE — 93010 ELECTROCARDIOGRAM REPORT: CPT

## 2018-09-13 RX ORDER — INSULIN LISPRO 100/ML
3 VIAL (ML) SUBCUTANEOUS ONCE
Qty: 0 | Refills: 0 | Status: COMPLETED | OUTPATIENT
Start: 2018-09-13 | End: 2018-09-13

## 2018-09-13 RX ADMIN — Medication 3 UNIT(S): at 22:17

## 2018-09-13 RX ADMIN — GABAPENTIN 100 MILLIGRAM(S): 400 CAPSULE ORAL at 05:10

## 2018-09-13 RX ADMIN — Medication 1: at 17:34

## 2018-09-13 RX ADMIN — Medication 2: at 08:52

## 2018-09-13 RX ADMIN — Medication 0.2 MILLIGRAM(S): at 05:10

## 2018-09-13 RX ADMIN — Medication 3: at 11:30

## 2018-09-13 RX ADMIN — GABAPENTIN 100 MILLIGRAM(S): 400 CAPSULE ORAL at 14:35

## 2018-09-13 RX ADMIN — PANTOPRAZOLE SODIUM 40 MILLIGRAM(S): 20 TABLET, DELAYED RELEASE ORAL at 05:10

## 2018-09-13 RX ADMIN — PANTOPRAZOLE SODIUM 40 MILLIGRAM(S): 20 TABLET, DELAYED RELEASE ORAL at 17:33

## 2018-09-13 RX ADMIN — GABAPENTIN 100 MILLIGRAM(S): 400 CAPSULE ORAL at 21:23

## 2018-09-13 RX ADMIN — Medication 0.2 MILLIGRAM(S): at 17:33

## 2018-09-13 NOTE — CONSULT NOTE ADULT - ASSESSMENT
Assesment and Plan:  This is a 83yFemale with history of Anemia  No pertinent family history in first degree relatives  Handoff  MEWS Score  CVA (cerebral vascular accident)  GERD (gastroesophageal reflux disease)  Diabetes mellitus  High cholesterol  Hypertension  CVA (cerebral vascular accident)  Anemia  Advanced care planning/counseling discussion  Prophylactic measure  GERD (gastroesophageal reflux disease)  Hypertension  Diabetes mellitus  CVA (cerebral vascular accident)  Acute anemia  No significant past surgical history  ABNORMAL LAB  40  Weakness   presents with Patient is a 83y old  Female who presents with a chief complaint of Sent from nursing home for anemia (13 Sep 2018 09:13) A/P:  84 y/o F with PMHx of HTN, HLD, GERD, 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 + hemoccult. Scheduled for EGD.     1. Sandy-operative Cardiac Risk Stratification.   -Unable to evaluate functional capacity.   - RCRI 6.6%, intermediate risk, but low risk procedure.   - No active chest pain, evidence of ischemia, decompensated CHF, significant valvular abnormality, or unstable arrhythmia and therefore no absolute cardiac contraindication to the planned surgical procedure.   - No further workup required prior to procedure, can follow up as an outpatient for further cardiac risk stratification     2. 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   - Appreciate Neurosurgery consult.  -No surgical intervention at this time.  - Continue to monitor.     3. Hypertension  - Moderately controlled.   - Can restart home losartan 50mg and Norvasc 10mg as tolerated.   - Avoid AV briana blockers as SB, asymptomatic.

## 2018-09-13 NOTE — CONSULT NOTE ADULT - SUBJECTIVE AND OBJECTIVE BOX
CARDIOLOGY CONSULTATION NOTE   Consult requested by:  Dr. 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 Patient is a very poor historian, most history was obtained from the chart. Patient was found to Hgb 6.6 on bloodwork 09/11 and sent to ED. Per chart, pt's son states that her "anemia is chronic" and she required PRBC in the past, and he notes she had an endoscopy a few years ago that showed an ulcer. Patient at baseline with confusion. Patient is primarily bedbound, can't evaluate functional capacity.       REVIEW OF SYMPTOMS:   Constitutional: Denied: fever, chills, weight loss or gain  Eyes: Denied: reddened ayes, eye discharge, eye pain  ENMT: Denied: ear pain, nasal discharge, mouth pain, throat pain or swelling  Cardiovascular: Denied: chest pain,  Chest pressure, palpitation, arrhythmia, irregular or fast heart beats, edema  Respiratory: Denied: cough, phlegm production, wheezes, dyspnea, orthopnea, PND,   GI: Denied: Abdominal pain, nausea, vomiting, diarrhea, constipation, melena, rectal bleed  : Denied: hematuria, frequency  Musculoskeletal: Denied: Muscle aches, weakness, pain  Hematology/lymp: Denied: Bleeding disorder, anemia, blood clotting  Neuro: Denied: Headache, light headedness, dizziness, numbness, aphasia, dysarthria, seizure,  syncope, near syncope  Psych: Denied: depression, anxiety  Integumentary/skin: Denied: rash, bruises, ecchymosis, itching  Allergy/Immunology: denied environmental or drug allergies    ALL OTHER REVIEW OF SYSTEMS ARE NEGATIVE.    MEDICATIONS  (STANDING):  cloNIDine 0.2 milliGRAM(s) Oral two times a day  dextrose 5%. 1000 milliLiter(s) (50 mL/Hr) IV Continuous <Continuous>  dextrose 50% Injectable 12.5 Gram(s) IV Push once  dextrose 50% Injectable 25 Gram(s) IV Push once  dextrose 50% Injectable 25 Gram(s) IV Push once  gabapentin 100 milliGRAM(s) Oral three times a day  influenza   Vaccine 0.5 milliLiter(s) IntraMuscular once  insulin lispro (HumaLOG) corrective regimen sliding scale   SubCutaneous three times a day before meals  pantoprazole  Injectable 40 milliGRAM(s) IV Push every 12 hours    MEDICATIONS  (PRN):  dextrose 40% Gel 15 Gram(s) Oral once PRN Blood Glucose LESS THAN 70 milliGRAM(s)/deciliter  glucagon  Injectable 1 milliGRAM(s) IntraMuscular once PRN Glucose LESS THAN 70 milligrams/deciliter        PAST MEDICAL & SURGICAL HISTORY:  CVA (cerebral vascular accident)  GERD (gastroesophageal reflux disease)  Diabetes mellitus  High cholesterol  Hypertension  No significant past surgical history      FAMILY HISTORY:  No pertinent family history in first degree relatives: No known hx of HTN or DM      SOCIAL HISTORY:    CIGARETTES:  No    ALCOHOL: No    DRUGS: No    Vital Signs Last 24 Hrs  T(C): 36.8 (13 Sep 2018 10:15), Max: 37.2 (12 Sep 2018 19:12)  T(F): 98.2 (13 Sep 2018 10:15), Max: 98.9 (12 Sep 2018 19:12)  HR: 55 (13 Sep 2018 10:15) (53 - 71)  BP: 123/73 (13 Sep 2018 10:15) (123/73 - 166/76)  RR: 18 (13 Sep 2018 10:15) (16 - 19)  SpO2: 96% (13 Sep 2018 10:15) (96% - 99%)    PHYSICAL EXAM:      Constitutional: No fever, chills, NAD, Comfortable    Eyes: Not reddened, no discharge    ENMT: No discharge, No pain    Neck: No JVD, No Bruit    Back: No CVA tenderness    Respiratory: Clear to auscultation bilaterally    Cardiovascular: RRR, Normal S1-2, No S3-, No friction rub murmur    Gastrointestinal: Soft, NT/ND. BS+, No organomegaly    Extremities: No edema    Vascular: Distal pulses intact    Neurological: Alert, awake, oriented, able to move extremities, speach is clear    Skin: No ecchymosis, no rash    Musculoskeletal: Non tender, no weaknss    Psychiatric: Aproppriate mood, no anxiety        INTERPRETATION OF TELEMETRY: Not on Telemetry    ECG:     I&O's Detail      LABS:                        10.2   8.8   )-----------( 383      ( 13 Sep 2018 07:13 )             30.9     09-13    142  |  103  |  18.0  ----------------------------<  257<H>  3.7   |  25.0  |  0.57    Ca    9.8      13 Sep 2018 02:28    TPro  6.7  /  Alb  3.5  /  TBili  <0.2<L>  /  DBili  x   /  AST  14  /  ALT  9   /  AlkPhos  86  09-11        PT/INR - ( 11 Sep 2018 23:09 )   PT: 10.9 sec;   INR: 0.99 ratio         PTT - ( 11 Sep 2018 23:09 )  PTT:25.9 sec    I&O's Summary      RADIOLOGY & ADDITIONAL STUDIES:  X-ray:    PREVIOUS DIAGNOSTIC TESTING:      ECHO  FINDINGS:  < from: CHRISTOPHER Echo Doppler (07.31.18 @ 11:45) >  PHYSICIAN INTERPRETATION:  Left Ventricle: The left ventricular internal cavity size is moderately   increased.  Global LV systolic function was normal. Left ventricular ejection   fraction, by visual estimation, is 60 to 65%.  Right Ventricle: Normal right ventricular size and function.  Left Atrium: Left atrial enlargement. Color flow doppler and intravenous   injection of agitated saline demonstrates the presence of an intact intra   atrial septum.  Right Atrium: Right atrial enlargement.  Pericardium: There is no evidence of pericardial effusion.  Mitral Valve: Structurally normal mitral valve, with normal leaflet   excursion. Trace mitral valve regurgitation is seen.  Tricuspid Valve: Structurally normal tricuspid valve, with normal leaflet   excursion. Trivial tricuspid regurgitation is visualized.  Aortic Valve: The aortic valve is trileaflet. Mild aortic valve   regurgitation is seen.  Pulmonic Valve: Structurally normal pulmonic valve, with normal leaflet   excursion.  Aorta: The aortic root, ascending aorta, aortic arch and descending aorta   are all structurally normal, with no evidence of dilitation or   obstruction.  Pulmonary Artery: The pulmonary artery is not well seen.  Shunts: Agitated saline contrast was given intravenously to evaluate for   intracardiac shunting.       Summary:   1. Technically good study.   2. Normalglobal left ventricular systolic function.   3. Left ventricular ejection fraction, by visual estimation, is 60 to   65%.   4. Moderately increased left ventricular internal cavity size.   5. Color flow doppler and intravenous injection of agitated saline   demonstrates the presence of an intact intra atrial septum.   6. Normal right ventricular size and function.   7. Trace tricuspid regurgitation.   8. Mild aortic regurgitation.   9. There is no evidence of pericardial effusion.  10. There is acalcified echo density attached to the left coronary cusp   of the aortic valve, most likely calcification of the leaflet. Recommend   repeating CHRISTOPHER in 3-6 months for progression.    [id number] Anup Nj MD, Electronically signed on 7/31/2018 at 4:52:43 PM    < end of copied text >      1) CARDIOLOGY CONSULTATION NOTE   Consult requested by:  Dr. 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 Patient is a very poor historian, most history was obtained from the chart. Patient was found to Hgb 6.6 on bloodwork 09/11 and sent to ED. Per chart, pt's son states that her "anemia is chronic" and she required PRBC in the past, and he notes she had an endoscopy a few years ago that showed an ulcer. Patient at baseline with confusion. Patient is primarily bedbound, can't evaluate functional capacity. Patient states she has no complaints today. Patient denies CP, SOB, orthopnea, PND, palpitations, dizziness, syncope, or near syncope. Patient states she feels fine at this time. Patient isn't able to really provide a detailed history.       REVIEW OF SYMPTOMS:   Constitutional: Denied: fever, chills, weight loss or gain  Eyes: Denied: reddened ayes, eye discharge, eye pain  ENMT: Denied: ear pain, nasal discharge, mouth pain, throat pain or swelling  Cardiovascular: Denied: chest pain,  Chest pressure, palpitation, arrhythmia, irregular or fast heart beats, edema  Respiratory: Denied: cough, phlegm production, wheezes, dyspnea, orthopnea, PND,   GI: Denied: Abdominal pain, nausea, vomiting, diarrhea, constipation, melena, rectal bleed  Neuro: Denied: Headache, light headedness, dizziness, numbness, aphasia, dysarthria, seizure,  syncope, near syncope      ALL OTHER REVIEW OF SYSTEMS ARE NEGATIVE.    MEDICATIONS  (STANDING):  cloNIDine 0.2 milliGRAM(s) Oral two times a day  dextrose 5%. 1000 milliLiter(s) (50 mL/Hr) IV Continuous <Continuous>  dextrose 50% Injectable 12.5 Gram(s) IV Push once  dextrose 50% Injectable 25 Gram(s) IV Push once  dextrose 50% Injectable 25 Gram(s) IV Push once  gabapentin 100 milliGRAM(s) Oral three times a day  influenza   Vaccine 0.5 milliLiter(s) IntraMuscular once  insulin lispro (HumaLOG) corrective regimen sliding scale   SubCutaneous three times a day before meals  pantoprazole  Injectable 40 milliGRAM(s) IV Push every 12 hours    MEDICATIONS  (PRN):  dextrose 40% Gel 15 Gram(s) Oral once PRN Blood Glucose LESS THAN 70 milliGRAM(s)/deciliter  glucagon  Injectable 1 milliGRAM(s) IntraMuscular once PRN Glucose LESS THAN 70 milligrams/deciliter        PAST MEDICAL & SURGICAL HISTORY:  CVA (cerebral vascular accident)  GERD (gastroesophageal reflux disease)  Diabetes mellitus  High cholesterol  Hypertension  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,   MRI/MRA with 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 which projects anteriorly and inferiorly,   No significant past surgical history      FAMILY HISTORY:  No pertinent family history in first degree relatives: No known hx of HTN or DM      SOCIAL HISTORY:    CIGARETTES:  No    ALCOHOL: No    DRUGS: No    Vital Signs Last 24 Hrs  T(C): 36.8 (13 Sep 2018 10:15), Max: 37.2 (12 Sep 2018 19:12)  T(F): 98.2 (13 Sep 2018 10:15), Max: 98.9 (12 Sep 2018 19:12)  HR: 55 (13 Sep 2018 10:15) (53 - 71)  BP: 123/73 (13 Sep 2018 10:15) (123/73 - 166/76)  RR: 18 (13 Sep 2018 10:15) (16 - 19)  SpO2: 96% (13 Sep 2018 10:15) (96% - 99%)    PHYSICAL EXAM:    Appearance: Normal, well nourished	  HEENT:   Normal oral mucosa, PERRL, EOMI, sclera non-icteric	  Lymphatic: No cervical lymphadenopathy  Cardiovascular: Normal S1 S2, No JVD, no significant murmurs, no JVD, no peripheral edema  Respiratory: Lungs clear to auscultation	  Psychiatry: A & O x 2, to person and hospital. Mood & affect appropriate  Gastrointestinal:  Soft, Non-tender, + BS, no bruits	  Skin: No rashes, No ecchymoses, No cyanosis  Neurologic: Right sided facial droop, decreased strength RUE and RLE   Extremities:No clubbing, cyanosis or edema          INTERPRETATION OF TELEMETRY: Not on Telemetry    ECG: SB, LVH, nonspecific ST/T wave changes I, aVL, poor R wave progression.     I&O's Detail      LABS:                        10.2   8.8   )-----------( 383      ( 13 Sep 2018 07:13 )             30.9     09-13    142  |  103  |  18.0  ----------------------------<  257<H>  3.7   |  25.0  |  0.57    Ca    9.8      13 Sep 2018 02:28    TPro  6.7  /  Alb  3.5  /  TBili  <0.2<L>  /  DBili  x   /  AST  14  /  ALT  9   /  AlkPhos  86  09-11        PT/INR - ( 11 Sep 2018 23:09 )   PT: 10.9 sec;   INR: 0.99 ratio         PTT - ( 11 Sep 2018 23:09 )  PTT:25.9 sec    I&O's Summary      RADIOLOGY & ADDITIONAL STUDIES:  X-ray:    PREVIOUS DIAGNOSTIC TESTING:      ECHO  FINDINGS:  < from: CHRISTOPHER Echo Doppler (07.31.18 @ 11:45) >  PHYSICIAN INTERPRETATION:  Left Ventricle: The left ventricular internal cavity size is moderately   increased.  Global LV systolic function was normal. Left ventricular ejection   fraction, by visual estimation, is 60 to 65%.  Right Ventricle: Normal right ventricular size and function.  Left Atrium: Left atrial enlargement. Color flow doppler and intravenous   injection of agitated saline demonstrates the presence of an intact intra   atrial septum.  Right Atrium: Right atrial enlargement.  Pericardium: There is no evidence of pericardial effusion.  Mitral Valve: Structurally normal mitral valve, with normal leaflet   excursion. Trace mitral valve regurgitation is seen.  Tricuspid Valve: Structurally normal tricuspid valve, with normal leaflet   excursion. Trivial tricuspid regurgitation is visualized.  Aortic Valve: The aortic valve is trileaflet. Mild aortic valve   regurgitation is seen.  Pulmonic Valve: Structurally normal pulmonic valve, with normal leaflet   excursion.  Aorta: The aortic root, ascending aorta, aortic arch and descending aorta   are all structurally normal, with no evidence of dilitation or   obstruction.  Pulmonary Artery: The pulmonary artery is not well seen.  Shunts: Agitated saline contrast was given intravenously to evaluate for   intracardiac shunting.       Summary:   1. Technically good study.   2. Normalglobal left ventricular systolic function.   3. Left ventricular ejection fraction, by visual estimation, is 60 to   65%.   4. Moderately increased left ventricular internal cavity size.   5. Color flow doppler and intravenous injection of agitated saline   demonstrates the presence of an intact intra atrial septum.   6. Normal right ventricular size and function.   7. Trace tricuspid regurgitation.   8. Mild aortic regurgitation.   9. There is no evidence of pericardial effusion.  10. There is acalcified echo density attached to the left coronary cusp   of the aortic valve, most likely calcification of the leaflet. Recommend   repeating CHRISTOPHER in 3-6 months for progression.    [id number] Anup Nj MD, Electronically signed on 7/31/2018 at 4:52:43 PM    < end of copied text >      1)

## 2018-09-13 NOTE — PROGRESS NOTE ADULT - SUBJECTIVE AND OBJECTIVE BOX
HOSPITALIST PROGRESS NOTE    ZION LEACH  [id number]  83yFemale    Patient is a 83y old  Female who presents with a chief complaint of Sent from nursing home for anemia (13 Sep 2018 11:00)      SUBJECTIVE:   Chart reviewed since last visit.  Patient seen and examined at bedside for anemia.  Confused, denies any dizziness, dyspnea, palpitations, chest pain or LOC.        OBJECTIVE:  Vital Signs Last 24 Hrs  T(C): 36.8 (13 Sep 2018 10:15), Max: 37.2 (12 Sep 2018 19:12)  T(F): 98.2 (13 Sep 2018 10:15), Max: 98.9 (12 Sep 2018 19:12)  HR: 55 (13 Sep 2018 10:15) (53 - 71)  BP: 123/73 (13 Sep 2018 10:15) (123/73 - 166/76)   RR: 18 (13 Sep 2018 10:15) (16 - 19)  SpO2: 96% (13 Sep 2018 10:15) (96% - 99%)    PHYSICAL EXAMINATION  General: NAD[+]   nontoxic[]   ill-appearing[]  Obese[]  HEENT: Anisocoria let facial NLF flattening  CVS: RRR[+]  Irregular[]  S1+S2[]   Murmur[]  RESP: Fair air entry bilaterally[]   Clear sounds[]   poor effort []  wheeze[]   Crackles[]  GI: Soft[]  Nondistended[]   Nontender[]   Bowel Sounds[]  Mass[]   HSM[]   Ascites[]  : suprapubic tenderness[-]   CVA Tenderness[]   Kessler[-]  MS: FROM[]   Edema[-]  CNS: Examination limited - follows commands, moves all extremities  INTEG: Skin is Warm[+]  dry[+] Lesion[] Decubitus[]  PSYCH: Fair Mood, Affect    MONITOR:  CAPILLARY BLOOD GLUCOSE      POCT Blood Glucose.: 272 mg/dL (13 Sep 2018 11:29)  POCT Blood Glucose.: 233 mg/dL (13 Sep 2018 07:34)  POCT Blood Glucose.: 286 mg/dL (12 Sep 2018 18:03)        I&O's Summary                          10.2   8.8   )-----------( 383      ( 13 Sep 2018 07:13 )             30.9     PT/INR - ( 11 Sep 2018 23:09 )   PT: 10.9 sec;   INR: 0.99 ratio         PTT - ( 11 Sep 2018 23:09 )  PTT:25.9 sec  09-13    142  |  103  |  18.0  ----------------------------<  257<H>  3.7   |  25.0  |  0.57    Ca    9.8      13 Sep 2018 02:28    TPro  6.7  /  Alb  3.5  /  TBili  <0.2<L>  /  DBili  x   /  AST  14  /  ALT  9   /  AlkPhos  86  09-11       TTE:  < from: TTE Echo Complete w/Doppler (07.28.18 @ 10:58) >  EXAM:  ECHO TRANSTHORACIC COMP W DOPP      PROCEDURE DATE:  Jul 28 2018   .      INTERPRETATION:  REPORT:    TRANSTHORACIC ECHOCARDIOGRAM REPORT         Patient Name:   ZION LEACH Patient Location: [facility] Rec #:  [id number]        Accession #:      [id number]  Account #:                      Height:           61.8 in 157.0 cm  YOB: 1934       Weight:           114.6 lb 52.00 kg  Patient Age:    83 years        BSA:              1.51 m²  Patient Gender: F  BP:               139/66 mmHg       Date of Exam: 7/28/2018 10:58:01 AM  Sonographer:  Jackson Harper Jr    Procedure:     2D Echo/Doppler/Color Doppler Complete.  Indications:   Cerebral infarction, unspecified - I63.9  Diagnosis:     Cerebral infarction, unspecified - I63.9  Study Details: Technically good study.         2D AND M-MODE MEASUREMENTS (normal ranges within parentheses):  Left                Normal    Aorta/Left           Normal  Ventricle:                    Atrium:  IVSd (2D): 1.14  (0.7-1.1) Aortic Root  2.70 cm (2.4-3.7)                cm              (2D):  LVPWd (2D):   0.98  (0.7-1.1) Left Atrium  3.08 cm (1.9-4.0)                cm              (2D):  LVIDd (2D):   3.78  (3.4-5.7) LA Volume    25.0                cm             Index        ml/m²  LVIDs (2D):   2.58            Right Ventricle:                cm              TAPSE:           1.84 cm  LV FS (2D):   31.7  (>25%)                %  Relative Wall 0.52  (<0.42)  Thickness    LV DIASTOLIC FUNCTION:  MVPeak E: 0.63 m/s E/e' Ratio: 13.70  MV Peak A: 0.97 m/s Decel Time: 215 msec  E/A Ratio: 0.65    SPECTRAL DOPPLER ANALYSIS (where applicable):  Mitral Valve:  MV P1/2 Time: 62.35 msec  MV Area, PHT: 3.53 cm²    LVOT Vmax:  LVOT VTI:  LVOT Diameter: 1.79 cm    Tricuspid Valve and PA/RV Systolic Pressure: TR Max Velocity: 2.00 m/s RA   Pressure: 3 mmHg RVSP/PASP: 19.0 mmHg       PHYSICIAN INTERPRETATION:  Left Ventricle: Normal left ventricular size and wall thicknesses, with   normal systolic function.  Global LV systolic function was hyperdynamic. Left ventricular ejection   fraction, by visual estimation, is >75%. Spectral Doppler shows impaired   relaxation pattern of left ventricular myocardial filling (Grade I   diastolic dysfunction). Normal LV filling pressures.  Right Ventricle: Normal right ventricular size and function. TV S' 0.1   m/s.  Left Atrium: The left atrium is normal in size.  Right Atrium: The right atrium is normal in size.  Pericardium: There is no evidence of pericardial effusion.  Mitral Valve: Thickening of the anterior and posterior mitral valve   leaflets. No evidence of mitral valve regurgitation is seen.  Tricuspid Valve: Trivial tricuspid regurgitation is visualized.  Aortic Valve: Sclerotic aortic valve with normal opening. Trivial aortic   valve regurgitation is seen.  Pulmonic Valve: The pulmonic valve was not well visualized. No indication   of pulmonic valve regurgitation.  Aorta: The aortic root is normal in size and structure.  Pulmonary Artery:The pulmonary artery is not well seen.  Venous: The inferior vena cava was normal sized, with respiratory size   variation greater than 50%.       Summary:   1. Technically good study.   2. Hyperdynamic global left ventricular systolic function.   3.Left ventricular ejection fraction, by visual estimation, is >75%.   4. Spectral Doppler shows impaired relaxation pattern of left   ventricular myocardial filling (Grade I diastolic dysfunction).   5. There is mild concentric left ventricular hypertrophy.   6. Thickening of the anterior and posterior mitral valve leaflets.   7. Trace tricuspid regurgitation.   8. Sclerotic aortic valve with normal opening. Strand like mobile Echo   densities attached to the tips of aortic valve leaflets. Recommend CHRISTOPHER   for further evaluation.    [id number] Willi Camacho MD, Electronically signed on 7/28/2018 at 4:30:51 PM              *** Final ***                  WILLI CAMACHO   This document has been electronically signed. Jul 28 2018 10:58AM    < end of copied text >    RADIOLOGY        MEDICATIONS  (STANDING):  cloNIDine 0.2 milliGRAM(s) Oral two times a day  dextrose 5%. 1000 milliLiter(s) (50 mL/Hr) IV Continuous <Continuous>  dextrose 50% Injectable 12.5 Gram(s) IV Push once  dextrose 50% Injectable 25 Gram(s) IV Push once  dextrose 50% Injectable 25 Gram(s) IV Push once  gabapentin 100 milliGRAM(s) Oral three times a day  influenza   Vaccine 0.5 milliLiter(s) IntraMuscular once  insulin lispro (HumaLOG) corrective regimen sliding scale   SubCutaneous three times a day before meals  pantoprazole  Injectable 40 milliGRAM(s) IV Push every 12 hours      MEDICATIONS  (PRN):  dextrose 40% Gel 15 Gram(s) Oral once PRN Blood Glucose LESS THAN 70 milliGRAM(s)/deciliter  glucagon  Injectable 1 milliGRAM(s) IntraMuscular once PRN Glucose LESS THAN 70 milligrams/deciliter

## 2018-09-13 NOTE — PROGRESS NOTE ADULT - SUBJECTIVE AND OBJECTIVE BOX
INTERVAL HPI/OVERNIGHT EVENTS:Patient FU for anemia. NO further bleeding noted. On PPI bid. On famotidine as well. Hct stable. Rectal exam revealed dark brown stools today.     MEDICATIONS  (STANDING):  cloNIDine 0.2 milliGRAM(s) Oral two times a day  dextrose 5%. 1000 milliLiter(s) (50 mL/Hr) IV Continuous <Continuous>  dextrose 50% Injectable 12.5 Gram(s) IV Push once  dextrose 50% Injectable 25 Gram(s) IV Push once  dextrose 50% Injectable 25 Gram(s) IV Push once  famotidine    Tablet 20 milliGRAM(s) Oral daily  gabapentin 100 milliGRAM(s) Oral three times a day  influenza   Vaccine 0.5 milliLiter(s) IntraMuscular once  insulin lispro (HumaLOG) corrective regimen sliding scale   SubCutaneous three times a day before meals  pantoprazole  Injectable 40 milliGRAM(s) IV Push every 12 hours    MEDICATIONS  (PRN):  dextrose 40% Gel 15 Gram(s) Oral once PRN Blood Glucose LESS THAN 70 milliGRAM(s)/deciliter  glucagon  Injectable 1 milliGRAM(s) IntraMuscular once PRN Glucose LESS THAN 70 milligrams/deciliter      Allergies    No Known Allergies    Intolerances        Vital Signs Last 24 Hrs  T(C): 36.7 (13 Sep 2018 07:22), Max: 37.2 (12 Sep 2018 19:12)  T(F): 98 (13 Sep 2018 07:22), Max: 98.9 (12 Sep 2018 19:12)  HR: 53 (13 Sep 2018 07:22) (53 - 71)  BP: 140/67 (13 Sep 2018 07:22) (140/67 - 166/76)  BP(mean): --  RR: 18 (13 Sep 2018 07:22) (16 - 19)  SpO2: 96% (13 Sep 2018 04:00) (96% - 99%)    LABS:                        10.2   8.8   )-----------( 383      ( 13 Sep 2018 07:13 )             30.9     09-13    142  |  103  |  18.0  ----------------------------<  257<H>  3.7   |  25.0  |  0.57    Ca    9.8      13 Sep 2018 02:28    TPro  6.7  /  Alb  3.5  /  TBili  <0.2<L>  /  DBili  x   /  AST  14  /  ALT  9   /  AlkPhos  86  09-11    PT/INR - ( 11 Sep 2018 23:09 )   PT: 10.9 sec;   INR: 0.99 ratio         PTT - ( 11 Sep 2018 23:09 )  PTT:25.9 sec      RADIOLOGY & ADDITIONAL TESTS:

## 2018-09-14 ENCOUNTER — RESULT REVIEW (OUTPATIENT)
Age: 83
End: 2018-09-14

## 2018-09-14 LAB
GLUCOSE BLDC GLUCOMTR-MCNC: 163 MG/DL — HIGH (ref 70–99)
GLUCOSE BLDC GLUCOMTR-MCNC: 178 MG/DL — HIGH (ref 70–99)
GLUCOSE BLDC GLUCOMTR-MCNC: 178 MG/DL — HIGH (ref 70–99)
GLUCOSE BLDC GLUCOMTR-MCNC: 228 MG/DL — HIGH (ref 70–99)

## 2018-09-14 PROCEDURE — 99233 SBSQ HOSP IP/OBS HIGH 50: CPT

## 2018-09-14 PROCEDURE — 43239 EGD BIOPSY SINGLE/MULTIPLE: CPT

## 2018-09-14 PROCEDURE — 88342 IMHCHEM/IMCYTCHM 1ST ANTB: CPT | Mod: 26

## 2018-09-14 PROCEDURE — 88305 TISSUE EXAM BY PATHOLOGIST: CPT | Mod: 26

## 2018-09-14 RX ORDER — AMLODIPINE BESYLATE 2.5 MG/1
10 TABLET ORAL DAILY
Qty: 0 | Refills: 0 | Status: DISCONTINUED | OUTPATIENT
Start: 2018-09-14 | End: 2018-09-17

## 2018-09-14 RX ORDER — PANTOPRAZOLE SODIUM 20 MG/1
40 TABLET, DELAYED RELEASE ORAL
Qty: 0 | Refills: 0 | Status: DISCONTINUED | OUTPATIENT
Start: 2018-09-14 | End: 2018-09-17

## 2018-09-14 RX ORDER — FERROUS SULFATE 325(65) MG
300 TABLET ORAL
Qty: 0 | Refills: 0 | Status: DISCONTINUED | OUTPATIENT
Start: 2018-09-14 | End: 2018-09-17

## 2018-09-14 RX ORDER — LOSARTAN POTASSIUM 100 MG/1
50 TABLET, FILM COATED ORAL DAILY
Qty: 0 | Refills: 0 | Status: DISCONTINUED | OUTPATIENT
Start: 2018-09-14 | End: 2018-09-17

## 2018-09-14 RX ADMIN — Medication 1: at 12:18

## 2018-09-14 RX ADMIN — GABAPENTIN 100 MILLIGRAM(S): 400 CAPSULE ORAL at 21:47

## 2018-09-14 RX ADMIN — Medication 0.2 MILLIGRAM(S): at 17:35

## 2018-09-14 RX ADMIN — PANTOPRAZOLE SODIUM 40 MILLIGRAM(S): 20 TABLET, DELAYED RELEASE ORAL at 05:03

## 2018-09-14 RX ADMIN — GABAPENTIN 100 MILLIGRAM(S): 400 CAPSULE ORAL at 13:51

## 2018-09-14 RX ADMIN — Medication 300 MILLIGRAM(S): at 17:35

## 2018-09-14 RX ADMIN — GABAPENTIN 100 MILLIGRAM(S): 400 CAPSULE ORAL at 05:03

## 2018-09-14 RX ADMIN — Medication 0.2 MILLIGRAM(S): at 05:03

## 2018-09-14 RX ADMIN — Medication 1: at 17:35

## 2018-09-14 NOTE — BRIEF OPERATIVE NOTE - OPERATION/FINDINGS
a few scattered distal antral erosions and some friability at the GE junction without erosion or ulcer. Antral and body biopsy done

## 2018-09-14 NOTE — PROGRESS NOTE ADULT - SUBJECTIVE AND OBJECTIVE BOX
HOSPITALIST PROGRESS NOTE    ZION LEACH  [id number]  83yFemale    Patient is a 83y old  Female who presents with a chief complaint of Sent from nursing home for anemia (14 Sep 2018 01:55)      SUBJECTIVE:   Chart reviewed since last visit.  Patient seen and examined at bedside for anemia  Underwent EGD earlier today  Currently denies any dyspnea, palpitations, chest pain, dizziness, nausea, vomiting or abdominal pain      OBJECTIVE:  Vital Signs Last 24 Hrs  T(C): 36.6 (14 Sep 2018 09:05), Max: 36.8 (13 Sep 2018 16:12)  T(F): 97.9 (14 Sep 2018 09:05), Max: 98.2 (13 Sep 2018 16:12)  HR: 50 (14 Sep 2018 09:05) (50 - 60)  BP: 153/73 (14 Sep 2018 09:05) (139/65 - 185/76)   RR: 18 (14 Sep 2018 09:05) (18 - 18)  SpO2: 100% (14 Sep 2018 09:05) (96% - 100%)    PHYSICAL EXAMINATION  General: NAD[+]   nontoxic[]   ill-appearing[]  Obese[]  HEENT: Anisocoria let facial NLF flattening  CVS: RRR[+]  Irregular[]  S1+S2[]   Murmur[]  RESP: Fair air entry bilaterally[]   Clear sounds[]   poor effort []  wheeze[]   Crackles[]  GI: Soft[]  Nondistended[]   Nontender[]   Bowel Sounds[]  Mass[]   HSM[]   Ascites[]  : suprapubic tenderness[-]   CVA Tenderness[]   Kessler[-]  MS: FROM[]   Edema[-]  CNS: Examination limited - follows commands, moves all extremities  INTEG: Skin is Warm[+]  dry[+] Lesion[] Decubitus[]  PSYCH: Fair Mood, Affect      MONITOR:  CAPILLARY BLOOD GLUCOSE      POCT Blood Glucose.: 163 mg/dL (14 Sep 2018 12:17)  POCT Blood Glucose.: 178 mg/dL (14 Sep 2018 08:15)  POCT Blood Glucose.: 298 mg/dL (13 Sep 2018 21:39)  POCT Blood Glucose.: 181 mg/dL (13 Sep 2018 17:34)        I&O's Summary    13 Sep 2018 07:01  -  14 Sep 2018 07:00  --------------------------------------------------------  IN: 0 mL / OUT: 1 mL / NET: -1 mL                            10.2   8.8   )-----------( 383      ( 13 Sep 2018 07:13 )             30.9       09-13    142  |  103  |  18.0  ----------------------------<  257<H>  3.7   |  25.0  |  0.57    Ca    9.8      13 Sep 2018 02:28               MEDICATIONS  (STANDING):  amLODIPine   Tablet 10 milliGRAM(s) Oral daily  cloNIDine 0.2 milliGRAM(s) Oral two times a day  dextrose 5%. 1000 milliLiter(s) (50 mL/Hr) IV Continuous <Continuous>  dextrose 50% Injectable 12.5 Gram(s) IV Push once  dextrose 50% Injectable 25 Gram(s) IV Push once  dextrose 50% Injectable 25 Gram(s) IV Push once  ferrous    sulfate Liquid 300 milliGRAM(s) Oral two times a day with meals  gabapentin 100 milliGRAM(s) Oral three times a day  influenza   Vaccine 0.5 milliLiter(s) IntraMuscular once  insulin lispro (HumaLOG) corrective regimen sliding scale   SubCutaneous three times a day before meals  losartan 50 milliGRAM(s) Oral daily  pantoprazole    Tablet 40 milliGRAM(s) Oral before breakfast      MEDICATIONS  (PRN):  dextrose 40% Gel 15 Gram(s) Oral once PRN Blood Glucose LESS THAN 70 milliGRAM(s)/deciliter  glucagon  Injectable 1 milliGRAM(s) IntraMuscular once PRN Glucose LESS THAN 70 milligrams/deciliter

## 2018-09-14 NOTE — BRIEF OPERATIVE NOTE - PRE-OP DX
History of peptic ulcer disease  09/14/2018    Sathish Shay  Occult blood in stools  09/14/2018    Sathish Shay  Other iron deficiency anemia  09/14/2018    Sathish Shay

## 2018-09-14 NOTE — PROGRESS NOTE ADULT - SUBJECTIVE AND OBJECTIVE BOX
The patient is a 83y old female who presents with a complaint of sent from the [facility] for anemia.  A test for occult blood in her stool was (+) positive on 9/13/2018.    She is scheduled to have an EGD.   (13 Sep 2018 14:40)    PAST MEDICAL HISTORY:  L.V.E.F. = 60 to 65%  TTE - 7/31/2018  Cerebral vascular accident  GERD - gastroesophageal reflux disease  Diabetes  High cholesterol  Hypertension      PAST SURGICAL HISTORY:  No past surgical history      MEDICATIONS  (STANDING):  cloNIDine 0.2 milliGRAM(s) Oral two times a day  dextrose 5%. 1000 milliLiter(s) (50 mL/Hr) IV Continuous <Continuous>  dextrose 50% Injectable 12.5 Gram(s) IV Push once  dextrose 50% Injectable 25 Gram(s) IV Push once  dextrose 50% Injectable 25 Gram(s) IV Push once  gabapentin 100 milliGRAM(s) Oral three times a day  influenza   Vaccine 0.5 milliLiter(s) IntraMuscular once  insulin lispro (HumaLOG) corrective regimen sliding scale   SubCutaneous three times a day before meals  pantoprazole  Injectable 40 milliGRAM(s) IV Push every 12 hours    MEDICATIONS  (PRN):  dextrose 40% Gel 15 Gram(s) Oral once PRN Blood Glucose LESS THAN 70 milliGRAM(s)/deciliter  glucagon  Injectable 1 milliGRAM(s) IntraMuscular once PRN Glucose LESS THAN 70 milligrams/deciliter      Allergies:     No Known Drug Allergies      SOCIAL HISTORY:                            10.2   8.8   )-----------( 383      ( 13 Sep 2018 07:13 )             30.9       PT/INR - ( 11 Sep 2018 23:09 )   PT: 10.9 sec;   INR: 0.99 ratio         PTT - ( 11 Sep 2018 23:09 )  PTT:25.9 sec    09-13    142  |  103  |  18.0  ----------------------------<  257<H>  3.7   |  25.0  |  0.57    Ca    9.8      13 Sep 2018 02:28    EKG:  -  7/27/2018  Sinus bradycardia  Minimal voltage criteria for LVH, may be normal variant  Cannot rule out Anterior infarct , age undetermined  Abnormal ECG    CT BRAIN STROKE PROTOCOL  -  7/27/2018  CLINICAL HISTORY = Facial droop, confusion  FINDINGS: Old small right basal ganglia lacunar infarct.  There is periventricular and subcortical white matter hypodensity without   mass effect, nonspecific, likely representing moderate chronic   microvascular ischemic changes. There is no compelling evidence for an   acute transcortical infarction. There is no evidence of mass, mass   effect, midline shift or extra-axial fluid collection.     TT ECHO:    Summary:   1. Technically good study.   2. Hyperdynamic global left ventricular systolic function.   3.Left ventricular ejection fraction, by visual estimation, is >75%.   4. Spectral Doppler shows impaired relaxation pattern of left         ventricular myocardial filling (Grade I diastolic dysfunction).   5. There is mild concentric left ventricular hypertrophy.   6. Thickening of the anterior and posterior mitral valve leaflets.   7. Trace tricuspid regurgitation.   8. Sclerotic aortic valve with normal opening. Strand like mobile Echo        densities attached to the tips of aortic valve leaflets. Recommend CHRISTOPHER         for further evaluation.    ASA # = 4  Mallampati # = not assessed

## 2018-09-14 NOTE — BRIEF OPERATIVE NOTE - COMMENTS
For the present just recommend pantropazole or other PPI daily and iron supplements. Will advance to mechanical diabetic diet.

## 2018-09-15 ENCOUNTER — TRANSCRIPTION ENCOUNTER (OUTPATIENT)
Age: 83
End: 2018-09-15

## 2018-09-15 LAB
GLUCOSE BLDC GLUCOMTR-MCNC: 191 MG/DL — HIGH (ref 70–99)
GLUCOSE BLDC GLUCOMTR-MCNC: 210 MG/DL — HIGH (ref 70–99)
GLUCOSE BLDC GLUCOMTR-MCNC: 231 MG/DL — HIGH (ref 70–99)
GLUCOSE BLDC GLUCOMTR-MCNC: 283 MG/DL — HIGH (ref 70–99)
HCT VFR BLD CALC: 30.3 % — LOW (ref 37–47)
HGB BLD-MCNC: 9.8 G/DL — LOW (ref 12–16)
MCHC RBC-ENTMCNC: 23.9 PG — LOW (ref 27–31)
MCHC RBC-ENTMCNC: 32.3 G/DL — SIGNIFICANT CHANGE UP (ref 32–36)
MCV RBC AUTO: 73.9 FL — LOW (ref 81–99)
PLATELET # BLD AUTO: 329 K/UL — SIGNIFICANT CHANGE UP (ref 150–400)
RBC # BLD: 4.1 M/UL — LOW (ref 4.4–5.2)
RBC # FLD: 20.1 % — HIGH (ref 11–15.6)
WBC # BLD: 7.3 K/UL — SIGNIFICANT CHANGE UP (ref 4.8–10.8)
WBC # FLD AUTO: 7.3 K/UL — SIGNIFICANT CHANGE UP (ref 4.8–10.8)

## 2018-09-15 PROCEDURE — 99233 SBSQ HOSP IP/OBS HIGH 50: CPT

## 2018-09-15 RX ORDER — FERROUS SULFATE 325(65) MG
5 TABLET ORAL
Qty: 0 | Refills: 0 | COMMUNITY
Start: 2018-09-15

## 2018-09-15 RX ORDER — PANTOPRAZOLE SODIUM 20 MG/1
1 TABLET, DELAYED RELEASE ORAL
Qty: 0 | Refills: 0 | COMMUNITY
Start: 2018-09-15

## 2018-09-15 RX ADMIN — LOSARTAN POTASSIUM 50 MILLIGRAM(S): 100 TABLET, FILM COATED ORAL at 06:50

## 2018-09-15 RX ADMIN — Medication 2: at 09:22

## 2018-09-15 RX ADMIN — PANTOPRAZOLE SODIUM 40 MILLIGRAM(S): 20 TABLET, DELAYED RELEASE ORAL at 06:49

## 2018-09-15 RX ADMIN — AMLODIPINE BESYLATE 10 MILLIGRAM(S): 2.5 TABLET ORAL at 06:50

## 2018-09-15 RX ADMIN — Medication 1: at 18:33

## 2018-09-15 RX ADMIN — Medication 300 MILLIGRAM(S): at 18:34

## 2018-09-15 RX ADMIN — Medication 0.2 MILLIGRAM(S): at 18:37

## 2018-09-15 RX ADMIN — GABAPENTIN 100 MILLIGRAM(S): 400 CAPSULE ORAL at 06:49

## 2018-09-15 RX ADMIN — Medication 300 MILLIGRAM(S): at 09:23

## 2018-09-15 RX ADMIN — Medication 0.2 MILLIGRAM(S): at 06:50

## 2018-09-15 RX ADMIN — Medication 3: at 13:50

## 2018-09-15 RX ADMIN — GABAPENTIN 100 MILLIGRAM(S): 400 CAPSULE ORAL at 21:25

## 2018-09-15 RX ADMIN — GABAPENTIN 100 MILLIGRAM(S): 400 CAPSULE ORAL at 13:51

## 2018-09-15 NOTE — DISCHARGE NOTE ADULT - NS AS ACTIVITY OBS
Bathing allowed/No Heavy lifting/straining/Do not make important decisions/Do not drive or operate machinery/Showering allowed/Walking-Indoors allowed/Walking-Outdoors allowed

## 2018-09-15 NOTE — DISCHARGE NOTE ADULT - PLAN OF CARE
resolution; Hgb 12 Continue Iron supplements, Epogen.  Monitor Hgb  Follow up with PMD Continue medications as prescribed.  Follow up with GI Diet and medications as prescribed.   Follow up with PMD

## 2018-09-15 NOTE — DISCHARGE NOTE ADULT - HOSPITAL COURSE
83 year old female with PMH HTN, DMT2, GERD and CVA with bed bound status sent in from Nursing Home for microcytic anemia (Hgb 6.7). Received PRBC x 2, with improvement. Currently hemoglobin 10 - remained stable after PRBC transfusion. Anemia work up consistent with DWAIN. No bleeding source identified.  Underwent EGD 09/14/18 shows a few scattered distal antral erosions and some friability at the GE junction without erosion or ulcer. Continue on H2B, PPI. Tolerating diet.    DMT2 -  uncontrolled hyperglycemia. Recent A1c 7.8. Continued on Insulins sliding scale.    HTN - continued on Clonidine. Resume Amlodipine, Losartan.    Hx CVA, mostly bedbound - unclear baseline, likely cognitive deficits.      Patient was seen and examined at bedside today.  Denies any abdominal pain, nausea, vomiting, dizziness, dyspnea, chest pain or palpitations.    Vital Signs Last 24 Hrs  T(C): 37 (15 Sep 2018 07:55), Max: 37 (14 Sep 2018 16:25)  T(F): 98.6 (15 Sep 2018 07:55), Max: 98.6 (14 Sep 2018 16:25)  HR: 50 (15 Sep 2018 07:55) (50 - 527)  BP: 155/72 (15 Sep 2018 07:55) (138/75 - 155/72)   RR: 18 (15 Sep 2018 07:55) (18 - 18)  SpO2: 98% (15 Sep 2018 07:55) (98% - 100%)    PHYSICAL EXAMINATION  General: NAD[+]   nontoxic[]   ill-appearing[]  Obese[]  HEENT: Anisocoria let facial NLF flattening  CVS: RRR[+]  Irregular[]  S1+S2[]   Murmur[]  RESP: Fair air entry bilaterally[]   Clear sounds[]   poor effort []  wheeze[]   Crackles[]  GI: Soft[]  Nondistended[]   Nontender[]   Bowel Sounds[]  Mass[]   HSM[]   Ascites[]  : suprapubic tenderness[-]   CVA Tenderness[]   Kessler[-]  MS: FROM[]   Edema[-]  CNS: Examination limited - follows commands, moves all extremities  INTEG: Skin is Warm[+]  dry[+] Lesion[] Decubitus[]  PSYCH: Fair Mood, Affect.    Given that patient anemia stable and no active source of bleeding identified, may be discharged back to Nursing Home. Medically stable for discharge.    Time for discharge 35 minutes

## 2018-09-15 NOTE — DISCHARGE NOTE ADULT - MEDICATION SUMMARY - MEDICATIONS TO STOP TAKING
I will STOP taking the medications listed below when I get home from the hospital:    insulin glargine  -- 12 unit(s) subcutaneous once a day at 8AM

## 2018-09-15 NOTE — DISCHARGE NOTE ADULT - PATIENT PORTAL LINK FT
You can access the [facility] Portal, offered by [facility], by registering with the following website: [email or web address]

## 2018-09-15 NOTE — DISCHARGE NOTE ADULT - SECONDARY DIAGNOSIS.
Erosive gastritis Essential hypertension Type 2 diabetes mellitus with hyperglycemia, with long-term current use of insulin

## 2018-09-15 NOTE — DISCHARGE NOTE ADULT - CARE PROVIDER_API CALL
Austin Sorenson), Geriatric Medicine; Internal Medicine  [address] [place] [address]  Phone: [phone]  Fax: [phone]    Sathish Godinez), Gastroenterology; Internal Medicine  [address] [place] [address]  Phone: [phone]  Fax: [phone]

## 2018-09-15 NOTE — DISCHARGE NOTE ADULT - MEDICATION SUMMARY - MEDICATIONS TO TAKE
I will START or STAY ON the medications listed below when I get home from the hospital:    aspirin 81 mg oral delayed release tablet  -- 1 tab(s) by mouth once a day  -- Indication: For Prevent heart attack stroke    losartan 100 mg oral tablet  -- 1 tab(s) by mouth once a day  -- Indication: For blood pressure    cloNIDine 0.2 mg oral tablet  -- 1 tab(s) by mouth 2 times a day  -- Indication: For blood rpessure    gabapentin 100 mg oral capsule  -- 1 cap(s) by mouth 3 times a day  -- Indication: For Pain    insulin glargine  -- 12 unit(s) subcutaneous once a day at 9PM  -- Indication: For Diabetes mellitus    amLODIPine 10 mg oral tablet  -- 1 tab(s) by mouth once a day  -- Indication: For blood pressure    epoetin america 10,000 units/mL preservative-free injectable solution  -- injectable 3 times a week  -- Indication: For Anemia    famotidine 20 mg oral tablet  -- 1 tab(s) by mouth once a day  -- Indication: For Gastritis    ferrous sulfate 300 mg/5 mL (60 mg elemental iron) oral liquid  -- 5 milliliter(s) by mouth 2 times a day (with meals)  -- Indication: For Anemia    polyethylene glycol 3350 oral powder for reconstitution  -- 17 gram(s) by mouth once a day  -- Indication: For Constipationm    Artificial Tears ophthalmic solution  -- 1 drop(s) to each affected eye 2 times a day  -- Indication: For Eye drops    pantoprazole 40 mg oral delayed release tablet  -- 1 tab(s) by mouth once a day (before a meal)  -- Indication: For Gastritis

## 2018-09-15 NOTE — DISCHARGE NOTE ADULT - CARE PLAN
Principal Discharge DX:	Iron deficiency anemia, unspecified iron deficiency anemia type  Goal:	resolution; Hgb 12  Assessment and plan of treatment:	Continue Iron supplements, Epogen.  Monitor Hgb  Follow up with PMD  Secondary Diagnosis:	Erosive gastritis  Assessment and plan of treatment:	Continue medications as prescribed.  Follow up with GI  Secondary Diagnosis:	Essential hypertension  Assessment and plan of treatment:	Diet and medications as prescribed.   Follow up with PMD  Secondary Diagnosis:	Type 2 diabetes mellitus with hyperglycemia, with long-term current use of insulin  Assessment and plan of treatment:	Diet and medications as prescribed.   Follow up with PMD

## 2018-09-16 LAB
GLUCOSE BLDC GLUCOMTR-MCNC: 168 MG/DL — HIGH (ref 70–99)
GLUCOSE BLDC GLUCOMTR-MCNC: 221 MG/DL — HIGH (ref 70–99)
GLUCOSE BLDC GLUCOMTR-MCNC: 221 MG/DL — HIGH (ref 70–99)
GLUCOSE BLDC GLUCOMTR-MCNC: 233 MG/DL — HIGH (ref 70–99)

## 2018-09-16 PROCEDURE — 99232 SBSQ HOSP IP/OBS MODERATE 35: CPT

## 2018-09-16 RX ADMIN — GABAPENTIN 100 MILLIGRAM(S): 400 CAPSULE ORAL at 13:03

## 2018-09-16 RX ADMIN — LOSARTAN POTASSIUM 50 MILLIGRAM(S): 100 TABLET, FILM COATED ORAL at 05:17

## 2018-09-16 RX ADMIN — GABAPENTIN 100 MILLIGRAM(S): 400 CAPSULE ORAL at 22:55

## 2018-09-16 RX ADMIN — Medication 2: at 12:01

## 2018-09-16 RX ADMIN — Medication 0.2 MILLIGRAM(S): at 05:17

## 2018-09-16 RX ADMIN — Medication 300 MILLIGRAM(S): at 17:00

## 2018-09-16 RX ADMIN — AMLODIPINE BESYLATE 10 MILLIGRAM(S): 2.5 TABLET ORAL at 05:17

## 2018-09-16 RX ADMIN — Medication 300 MILLIGRAM(S): at 08:04

## 2018-09-16 RX ADMIN — Medication 2: at 08:04

## 2018-09-16 RX ADMIN — GABAPENTIN 100 MILLIGRAM(S): 400 CAPSULE ORAL at 05:17

## 2018-09-16 RX ADMIN — PANTOPRAZOLE SODIUM 40 MILLIGRAM(S): 20 TABLET, DELAYED RELEASE ORAL at 05:17

## 2018-09-16 RX ADMIN — Medication 1: at 17:00

## 2018-09-16 RX ADMIN — Medication 0.2 MILLIGRAM(S): at 17:00

## 2018-09-16 NOTE — PROGRESS NOTE ADULT - SUBJECTIVE AND OBJECTIVE BOX
HOSPITALIST PROGRESS NOTE    ZION LEACH  [id number]  83yFemale    Patient is a 83y old  Female who presents with a chief complaint of Sent from nursing home for anemia (15 Sep 2018 10:20)      SUBJECTIVE:   Chart reviewed since last visit.  Patient seen and examined at bedside for anemia.  Denies any dizziness, nausea, vomiting, chest pain, dyspnea.      OBJECTIVE:  Vital Signs Last 24 Hrs  T(C): 36.9 (16 Sep 2018 07:55), Max: 37.3 (15 Sep 2018 16:10)  T(F): 98.4 (16 Sep 2018 07:55), Max: 99.1 (15 Sep 2018 16:10)  HR: 48 (16 Sep 2018 07:55) (48 - 60)  BP: 135/67 (16 Sep 2018 07:55) (128/63 - 160/70)   RR: 18 (16 Sep 2018 07:55) (18 - 18)  SpO2: 95% (16 Sep 2018 07:55) (95% - 97%)    HYSICAL EXAMINATION  General: NAD[+]   nontoxic[]   ill-appearing[]  Obese[]  HEENT: Anisocori, facial asymmetry  CVS: RRR[+]  Irregular[]  S1+S2[]   Murmur[]  RESP: Fair air entry bilaterally[]   Clear sounds[]   poor effort []  wheeze[]   Crackles[]  GI: Soft[]  Nondistended[]   Nontender[]   Bowel Sounds[]  Mass[]   HSM[]   Ascites[]  : suprapubic tenderness[-]   CVA Tenderness[]   Kessler[-]  MS: FROM[]   Edema[-]  CNS: Examination limited - follows commands, moves all extremities  INTEG: Skin is Warm[+]  dry[+] Lesion[] Decubitus[]  PSYCH: Fair Mood, Affect  MONITOR:  CAPILLARY BLOOD GLUCOSE      POCT Blood Glucose.: 233 mg/dL (16 Sep 2018 12:00)  POCT Blood Glucose.: 221 mg/dL (16 Sep 2018 07:50)  POCT Blood Glucose.: 231 mg/dL (15 Sep 2018 21:24)  POCT Blood Glucose.: 191 mg/dL (15 Sep 2018 17:52)  POCT Blood Glucose.: 283 mg/dL (15 Sep 2018 13:06)           MEDICATIONS  (STANDING):  amLODIPine   Tablet 10 milliGRAM(s) Oral daily  cloNIDine 0.2 milliGRAM(s) Oral two times a day  dextrose 5%. 1000 milliLiter(s) (50 mL/Hr) IV Continuous <Continuous>  dextrose 50% Injectable 12.5 Gram(s) IV Push once  dextrose 50% Injectable 25 Gram(s) IV Push once  dextrose 50% Injectable 25 Gram(s) IV Push once  ferrous    sulfate Liquid 300 milliGRAM(s) Oral two times a day with meals  gabapentin 100 milliGRAM(s) Oral three times a day  influenza   Vaccine 0.5 milliLiter(s) IntraMuscular once  insulin lispro (HumaLOG) corrective regimen sliding scale   SubCutaneous three times a day before meals  losartan 50 milliGRAM(s) Oral daily  pantoprazole    Tablet 40 milliGRAM(s) Oral before breakfast      MEDICATIONS  (PRN):  dextrose 40% Gel 15 Gram(s) Oral once PRN Blood Glucose LESS THAN 70 milliGRAM(s)/deciliter  glucagon  Injectable 1 milliGRAM(s) IntraMuscular once PRN Glucose LESS THAN 70 milligrams/deciliter

## 2018-09-17 VITALS — HEART RATE: 63 BPM | DIASTOLIC BLOOD PRESSURE: 67 MMHG | SYSTOLIC BLOOD PRESSURE: 147 MMHG | TEMPERATURE: 98 F

## 2018-09-17 LAB
ANION GAP SERPL CALC-SCNC: 16 MMOL/L — SIGNIFICANT CHANGE UP (ref 5–17)
BUN SERPL-MCNC: 13 MG/DL — SIGNIFICANT CHANGE UP (ref 8–20)
CALCIUM SERPL-MCNC: 9 MG/DL — SIGNIFICANT CHANGE UP (ref 8.6–10.2)
CHLORIDE SERPL-SCNC: 104 MMOL/L — SIGNIFICANT CHANGE UP (ref 98–107)
CO2 SERPL-SCNC: 23 MMOL/L — SIGNIFICANT CHANGE UP (ref 22–29)
CREAT SERPL-MCNC: 0.44 MG/DL — LOW (ref 0.5–1.3)
GLUCOSE BLDC GLUCOMTR-MCNC: 226 MG/DL — HIGH (ref 70–99)
GLUCOSE BLDC GLUCOMTR-MCNC: 318 MG/DL — HIGH (ref 70–99)
GLUCOSE SERPL-MCNC: 213 MG/DL — HIGH (ref 70–115)
HCT VFR BLD CALC: 30.9 % — LOW (ref 37–47)
HGB BLD-MCNC: 10.3 G/DL — LOW (ref 12–16)
MCHC RBC-ENTMCNC: 24.4 PG — LOW (ref 27–31)
MCHC RBC-ENTMCNC: 33.3 G/DL — SIGNIFICANT CHANGE UP (ref 32–36)
MCV RBC AUTO: 73.2 FL — LOW (ref 81–99)
PLATELET # BLD AUTO: 337 K/UL — SIGNIFICANT CHANGE UP (ref 150–400)
POTASSIUM SERPL-MCNC: 4.1 MMOL/L — SIGNIFICANT CHANGE UP (ref 3.5–5.3)
POTASSIUM SERPL-SCNC: 4.1 MMOL/L — SIGNIFICANT CHANGE UP (ref 3.5–5.3)
RBC # BLD: 4.22 M/UL — LOW (ref 4.4–5.2)
RBC # FLD: 20.4 % — HIGH (ref 11–15.6)
SODIUM SERPL-SCNC: 143 MMOL/L — SIGNIFICANT CHANGE UP (ref 135–145)
WBC # BLD: 7.9 K/UL — SIGNIFICANT CHANGE UP (ref 4.8–10.8)
WBC # FLD AUTO: 7.9 K/UL — SIGNIFICANT CHANGE UP (ref 4.8–10.8)

## 2018-09-17 PROCEDURE — 99239 HOSP IP/OBS DSCHRG MGMT >30: CPT

## 2018-09-17 RX ADMIN — Medication 2: at 10:07

## 2018-09-17 RX ADMIN — GABAPENTIN 100 MILLIGRAM(S): 400 CAPSULE ORAL at 05:03

## 2018-09-17 RX ADMIN — Medication 0.2 MILLIGRAM(S): at 05:03

## 2018-09-17 RX ADMIN — GABAPENTIN 100 MILLIGRAM(S): 400 CAPSULE ORAL at 12:39

## 2018-09-17 RX ADMIN — AMLODIPINE BESYLATE 10 MILLIGRAM(S): 2.5 TABLET ORAL at 05:03

## 2018-09-17 RX ADMIN — PANTOPRAZOLE SODIUM 40 MILLIGRAM(S): 20 TABLET, DELAYED RELEASE ORAL at 05:03

## 2018-09-17 RX ADMIN — Medication 300 MILLIGRAM(S): at 12:39

## 2018-09-17 RX ADMIN — LOSARTAN POTASSIUM 50 MILLIGRAM(S): 100 TABLET, FILM COATED ORAL at 05:03

## 2018-09-17 RX ADMIN — Medication 4: at 12:37

## 2018-09-17 NOTE — PROGRESS NOTE ADULT - PROBLEM SELECTOR PLAN 2
Continue Clonidine - change if bradycardia worsens  Resume ARB, CCB
Continue Clonidine, ARB, CCB
Continue Clonidine.  Resume ARB, CCB once BP rises
Continue Clonidine, ARB, CCB

## 2018-09-17 NOTE — PROGRESS NOTE ADULT - REASON FOR ADMISSION
Sent from nursing home for anemia

## 2018-09-17 NOTE — PROGRESS NOTE ADULT - ATTENDING COMMENTS
Patient see, examined at bedside for anemia.  Denies any dizziness, chest pain, dyspnea, nausea, vomiting or abdominal pain.  VSS, examination unchanged  Mild hyperglycemia, hgb stable.  Medically stable for discharge back to Nursing Home.
Plan for EGD in am - cardiology called for clearance.  Called son Chandra Membreno listed in contact - left voicemail.
Discharge back to Nursing Home in am

## 2018-09-17 NOTE — PROGRESS NOTE ADULT - SUBJECTIVE AND OBJECTIVE BOX
CC: Sent from nursing home for anemia     HPI:  82 y/o F, primarily Faroese speaking, hx CVA w/ residual deficits, primarily bedbound, HTN, who was sent from [facility] for low Hgb.      INTERVAL HPI/OVERNIGHT EVENTS: Patient seen and examined     Vital Signs Last 24 Hrs  T(C): 37.1 (17 Sep 2018 08:05), Max: 37.2 (16 Sep 2018 23:56)  T(F): 98.7 (17 Sep 2018 08:05), Max: 98.9 (16 Sep 2018 23:56)  HR: 61 (17 Sep 2018 08:05) (54 - 88)  BP: 151/76 (17 Sep 2018 08:05) (151/68 - 167/54)  BP(mean): --  RR: 18 (17 Sep 2018 08:05) (18 - 18)  SpO2: 98% (17 Sep 2018 08:05) (98% - 98%)  I&O's Detail                                10.3   7.9   )-----------( 337      ( 17 Sep 2018 07:20 )             30.9     17 Sep 2018 07:20    143    |  104    |  13.0   ----------------------------<  213    4.1     |  23.0   |  0.44     Ca    9.0        17 Sep 2018 07:20        CAPILLARY BLOOD GLUCOSE      POCT Blood Glucose.: 318 mg/dL (17 Sep 2018 12:21)  POCT Blood Glucose.: 226 mg/dL (17 Sep 2018 09:02)  POCT Blood Glucose.: 221 mg/dL (16 Sep 2018 20:53)  POCT Blood Glucose.: 168 mg/dL (16 Sep 2018 16:52)          MEDICATIONS  (STANDING):  amLODIPine   Tablet 10 milliGRAM(s) Oral daily  cloNIDine 0.2 milliGRAM(s) Oral two times a day  dextrose 5%. 1000 milliLiter(s) (50 mL/Hr) IV Continuous <Continuous>  dextrose 50% Injectable 12.5 Gram(s) IV Push once  dextrose 50% Injectable 25 Gram(s) IV Push once  dextrose 50% Injectable 25 Gram(s) IV Push once  ferrous    sulfate Liquid 300 milliGRAM(s) Oral two times a day with meals  gabapentin 100 milliGRAM(s) Oral three times a day  influenza   Vaccine 0.5 milliLiter(s) IntraMuscular once  insulin lispro (HumaLOG) corrective regimen sliding scale   SubCutaneous three times a day before meals  losartan 50 milliGRAM(s) Oral daily  pantoprazole    Tablet 40 milliGRAM(s) Oral before breakfast    MEDICATIONS  (PRN):  dextrose 40% Gel 15 Gram(s) Oral once PRN Blood Glucose LESS THAN 70 milliGRAM(s)/deciliter  glucagon  Injectable 1 milliGRAM(s) IntraMuscular once PRN Glucose LESS THAN 70 milligrams/deciliter      RADIOLOGY & ADDITIONAL TESTS: CC: Sent from nursing home for anemia     HPI:  84 y/o F, primarily Swedish speaking, hx CVA w/ residual deficits, primarily bedbound, HTN, who was sent from [facility] for low Hgb.      INTERVAL HPI/OVERNIGHT EVENTS: Patient seen and examined lying in bed with .  Patient denies any headache, dizziness, SOB, CP, abdominal pain, nausea, vomiting, dysuria.  Other ROS reviewed and are negative.    Vital Signs Last 24 Hrs  T(C): 37.1 (17 Sep 2018 08:05), Max: 37.2 (16 Sep 2018 23:56)  T(F): 98.7 (17 Sep 2018 08:05), Max: 98.9 (16 Sep 2018 23:56)  HR: 61 (17 Sep 2018 08:05) (54 - 88)  BP: 151/76 (17 Sep 2018 08:05) (151/68 - 167/54)  BP(mean): --  RR: 18 (17 Sep 2018 08:05) (18 - 18)  SpO2: 98% (17 Sep 2018 08:05) (98% - 98%)  I&O's Detail    PHYSICAL EXAM:  GENERAL: NAD  HEAD:  Atraumatic, Normocephalic  NECK: Supple, No JVD, Normal thyroid  NERVOUS SYSTEM:  Alert & Oriented X3, forgetful at times, Good concentration; generalized weakness  CHEST/LUNG: Clear to auscultation bilaterally; No rales, rhonchi, wheezing, or rubs  HEART: Regular rate and rhythm; No murmurs, rubs, or gallops  ABDOMEN: Soft, Nontender, Nondistended; Bowel sounds present  EXTREMITIES:  2+ Peripheral Pulses, No clubbing, cyanosis, or edema                              10.3   7.9   )-----------( 337      ( 17 Sep 2018 07:20 )             30.9     17 Sep 2018 07:20    143    |  104    |  13.0   ----------------------------<  213    4.1     |  23.0   |  0.44     Ca    9.0        17 Sep 2018 07:20        CAPILLARY BLOOD GLUCOSE  POCT Blood Glucose.: 318 mg/dL (17 Sep 2018 12:21)  POCT Blood Glucose.: 226 mg/dL (17 Sep 2018 09:02)  POCT Blood Glucose.: 221 mg/dL (16 Sep 2018 20:53)  POCT Blood Glucose.: 168 mg/dL (16 Sep 2018 16:52)          MEDICATIONS  (STANDING):  amLODIPine   Tablet 10 milliGRAM(s) Oral daily  cloNIDine 0.2 milliGRAM(s) Oral two times a day  dextrose 5%. 1000 milliLiter(s) (50 mL/Hr) IV Continuous <Continuous>  dextrose 50% Injectable 12.5 Gram(s) IV Push once  dextrose 50% Injectable 25 Gram(s) IV Push once  dextrose 50% Injectable 25 Gram(s) IV Push once  ferrous    sulfate Liquid 300 milliGRAM(s) Oral two times a day with meals  gabapentin 100 milliGRAM(s) Oral three times a day  influenza   Vaccine 0.5 milliLiter(s) IntraMuscular once  insulin lispro (HumaLOG) corrective regimen sliding scale   SubCutaneous three times a day before meals  losartan 50 milliGRAM(s) Oral daily  pantoprazole    Tablet 40 milliGRAM(s) Oral before breakfast    MEDICATIONS  (PRN):  dextrose 40% Gel 15 Gram(s) Oral once PRN Blood Glucose LESS THAN 70 milliGRAM(s)/deciliter  glucagon  Injectable 1 milliGRAM(s) IntraMuscular once PRN Glucose LESS THAN 70 milligrams/deciliter

## 2018-09-17 NOTE — PROGRESS NOTE ADULT - PROBLEM SELECTOR PLAN 1
Continue Iron  Continue to monitor Hgb
continue to monitor Hgb  Transfuse for Hgb <7  Advance diet
continue to monitor Hgb  Transfuse for Hgb <7  Clear liquids  EGD - no absolute medical contraindications for EGD, sedation.
Continue Iron  Continue to monitor Hgb

## 2018-09-17 NOTE — PROGRESS NOTE ADULT - PROBLEM SELECTOR PROBLEM 3
Type 2 diabetes mellitus with hyperglycemia, with long-term current use of insulin

## 2018-09-17 NOTE — PROGRESS NOTE ADULT - PROBLEM SELECTOR PLAN 3
Continue carb consistent diet  Continue Insulins -
Continue carb consistent diet  Continue Insulins - if continues to have hyperglycemia will needs basal, mealtime Insulins
Continue carb consistent diet  Continue Insulins - if continues to have hyperglycemia will needs basal, mealtime Insulins
Continue carb consistent diet  Continue Insulins -

## 2018-09-17 NOTE — PROGRESS NOTE ADULT - ASSESSMENT
83 year old female with PMH HTN, DMT2, GERD and CVA with bed bound status sent in from Nursing Home for microcytic anemia (Hgb 6.7). Received PRBC x 2, with improvement. Currently hemoglobin 10.  Anemia work up consistent with DWAIN. No bleeding source identified.    DMT2 -  uncontrolled hyperglycemia on current regimen. Recent A1c 7.8.    HTN - continued on Clonidine (was on Losartan Amlodipine and Clonidine at NH) - stable.    Hx CVA, mostly bedbound - unclear baseline.
83 year old female with PMH HTN, DMT2, GERD and CVA with bed bound status sent in from Nursing Home for microcytic anemia (Hgb 6.7). Received PRBC x 2, with improvement. Currently hemoglobin 10.  Anemia work up consistent with DWAIN. No bleeding source identified.  EGD 09/14/18 shows a few scattered distal antral erosions and some friability at the GE junction without erosion or ulcer.    DMT2 -  uncontrolled hyperglycemia on current regimen. Recent A1c 7.8.    HTN - continued on Clonidine (was on Losartan, Amlodipine and Clonidine at NH) - stable.     Hx CVA, mostly bedbound - unclear baseline, likely cognitive decficits
83 year old female with PMH HTN, DMT2, GERD and CVA with bed bound status sent in from Nursing Home for microcytic anemia (Hgb 6.7). Received PRBC x 2, with improvement. Currently hemoglobin 10.  Anemia work up consistent with DWAIN. No bleeding source identified.  EGD 09/14/18 shows a few scattered distal antral erosions and some friability at the GE junction without erosion or ulcer.    DMT2 -  uncontrolled hyperglycemia on current regimen. Recent A1c 7.8.    HTN - continued on Clonidine (was on Losartan, Amlodipine and Clonidine at NH) - stable.     Hx CVA, mostly bedbound - unclear baseline, likely cognitive decficits.    Medically stable for discharge back to Nursing Home
83 year old female with PMH HTN, DMT2, GERD and CVA with bed bound status sent in from Nursing Home for microcytic anemia (Hgb 6.7). Received PRBC x 2, with improvement. Currently hemoglobin 10.  Anemia work up consistent with DWAIN. No bleeding source identified.  EGD 09/14/18 shows a few scattered distal antral erosions and some friability at the GE junction without erosion or ulcer.    DMT2 -  uncontrolled hyperglycemia on current regimen. Recent A1c 7.8.    HTN - continued on Losartan, Amlodipine and Clonidine - stable.     Hx CVA, mostly bedbound - unclear baseline, likely cognitive decficits.    Medically stable for discharge back to Nursing Home
Patient with CVA, acute anemia. No active bleeding. EGD tomorrow if cleared by cardiology.   PPI bid  NPO  Stop famotidine

## 2018-09-20 LAB — SURGICAL PATHOLOGY FINAL REPORT - CH: SIGNIFICANT CHANGE UP

## 2018-10-25 PROCEDURE — 36415 COLL VENOUS BLD VENIPUNCTURE: CPT

## 2018-10-25 PROCEDURE — T1013: CPT

## 2018-10-25 PROCEDURE — 86850 RBC ANTIBODY SCREEN: CPT

## 2018-10-25 PROCEDURE — P9016: CPT

## 2018-10-25 PROCEDURE — 86900 BLOOD TYPING SEROLOGIC ABO: CPT

## 2018-10-25 PROCEDURE — 36430 TRANSFUSION BLD/BLD COMPNT: CPT

## 2018-10-25 PROCEDURE — 83550 IRON BINDING TEST: CPT

## 2018-10-25 PROCEDURE — 93005 ELECTROCARDIOGRAM TRACING: CPT

## 2018-10-25 PROCEDURE — 86923 COMPATIBILITY TEST ELECTRIC: CPT

## 2018-10-25 PROCEDURE — 88342 IMHCHEM/IMCYTCHM 1ST ANTB: CPT

## 2018-10-25 PROCEDURE — 85027 COMPLETE CBC AUTOMATED: CPT

## 2018-10-25 PROCEDURE — 82962 GLUCOSE BLOOD TEST: CPT

## 2018-10-25 PROCEDURE — 82272 OCCULT BLD FECES 1-3 TESTS: CPT

## 2018-10-25 PROCEDURE — 85610 PROTHROMBIN TIME: CPT

## 2018-10-25 PROCEDURE — 86901 BLOOD TYPING SEROLOGIC RH(D): CPT

## 2018-10-25 PROCEDURE — 85730 THROMBOPLASTIN TIME PARTIAL: CPT

## 2018-10-25 PROCEDURE — 88305 TISSUE EXAM BY PATHOLOGIST: CPT

## 2018-10-25 PROCEDURE — 84466 ASSAY OF TRANSFERRIN: CPT

## 2018-10-25 PROCEDURE — 82728 ASSAY OF FERRITIN: CPT

## 2018-10-25 PROCEDURE — 80048 BASIC METABOLIC PNL TOTAL CA: CPT

## 2018-10-25 PROCEDURE — 99285 EMERGENCY DEPT VISIT HI MDM: CPT | Mod: 25

## 2018-10-25 PROCEDURE — 80053 COMPREHEN METABOLIC PANEL: CPT

## 2019-05-17 NOTE — OCCUPATIONAL THERAPY INITIAL EVALUATION ADULT - LIGHT TOUCH SENSATION, RLE, REHAB EVAL
Community Care Team documentation for patient in [facility] The information below provided by:[facility] PT Update: discharged Nursing Update:wound healing still slowly, much improvement in slough with use of silvasorb to wound bed where slough present. surgeon reported impressed with woud healing. no changes deo ALVARADO-W-F wound vac with grey foam and silvasorb Discharge Date:5/16/19 Assign to [address] [place] [address]
difficult to assess due to decreased cognition

## 2019-11-22 NOTE — ED ADULT NURSE NOTE - NS ED NURSE LEVEL OF CONSCIOUSNESS AFFECT
Calm
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

## 2020-01-01 NOTE — PROGRESS NOTE ADULT - ASSESSMENT
83 yo female, PMHx GERD, DM, HTN, HLD, found unresponsive admitted with hypertensive emergency and L basal ganglia hemorrhage secondary to hypertension, with uncontrolled hyperglycemia.    PLAN:  - Will obtain STAT CT head should patient mental status change/clinically decompensate  - f/u MRI/A  - Hold anticoagulation/antiplatelet therapy  - Titrate home antihypertensives for SBP goal < 140   - HOB >30 degrees  - Q1H neuro checks  - PT/OT/Speech/PM&R  - Statin therapy   - Passed swallow eval, on mechanical soft diet  - Started on Lantus for hyperglycemia, monitor FS AC and HS, cover with ISS  - PPI daily for GERD (4) walks frequently

## 2020-06-08 NOTE — CONSULT NOTE ADULT - ASSESSMENT
08-Jun-2020 00:00 A/P:  Patient is a 82 y/o Female with a PMHx of HTN, HLD, 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, GERD who is currently a resident of [facility].On July 27th she appeared to have more pronounced facial asymmetry and confusion. Patient was transferred to ED to be evaluated, and initial CT was negative. Pt was admitted for stroke/TIA evaluation. 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 Patient also had TTE which showed hyperdynamic LVEF 75%, grade I diastolic dysfunction, trace TR, and strand like mobile echo densities attached to tips of the aortic valve leaflets, sclerotic aortic valve with normal opening. Patient had a positive blood cx with GNR, but turned out to be entered in error. Consulted at this time to evaluate for CHRISTOPHER.     1.?Endocarditis  - Strand like mobile densities attached to tips of aortic valve leaflets.  - Obtain CHRISTOPHER  - However patient with dysphagia on puree diet with thin liquids.  - No true positive blood cx's.   - No leukocytosis.  - Appreciate ID consult    2. 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   - Appreciate Neurosurgery consult.  -No surgical intervention at this time.  - Continue to monitor.     3.

## 2022-02-15 NOTE — ED ADULT TRIAGE NOTE - WEIGHT IN LBS
2/15/2022  IYvan DO, saw and evaluated the patient  I have discussed the patient with the resident/non-physician practitioner and agree with the resident's/non-physician practitioner's findings, Plan of Care, and MDM as documented in the resident's/non-physician practitioner's note, except where noted  All available labs and Radiology studies were reviewed  I was present for key portions of any procedure(s) performed by the resident/non-physician practitioner and I was immediately available to provide assistance  At this point I agree with the current assessment done in the Emergency Department  I have conducted an independent evaluation of this patient a history and physical is as follows:    Patient presents with concerns for painful lumps to the right side of her neck that started about a week ago  No other symptoms  No preceding symptoms or irritants that she knows of  No prior similar symptoms  On exam the patient is in no acute distress  She is lying back on the stretcher comfortably  Patient is in no respiratory distress  Trachea is midline  Patient does have 2 small palpable nodules along the right distal sternocleidomastoid muscle just along the line of the anterior cervical lymph chain  Bedside ultrasound shows that there does not appear to be any external or internal carotid involvement  No other vascular involvement  Area does not appear consistent with an abscess but more consistent with a lymph node  Plan is to continue with supportive care with anti-inflammatories and warm compresses  Will have her follow up with family doctor in the next 7-10 days if symptoms are persisting for further evaluation and possible need for blood work  Advised return ER precautions if she does develop any respiratory symptoms, worsening symptoms, etcetera    ED Course         Critical Care Time  Procedures 130

## 2022-06-22 NOTE — DISCHARGE NOTE ADULT - NS MD DC PLAN IMMU FLU REF OTH
Left message to call back for: Nicole  Information to relay to patient: Pt has visit with Dr. Quijano on 6/24/22, however, provider is out on emergency leave. Please reschedule visit.     Out of season

## 2022-08-08 NOTE — PHYSICAL THERAPY INITIAL EVALUATION ADULT - GENERAL OBSERVATIONS, REHAB EVAL
For information on Fall & Injury Prevention, visit: [email or web address] OR  [email or web address] OR  https://www.cdc.gov/steadi/patient.html Pt received in bed supine, agreeable to PT. C.o R knee pain with movement.

## 2022-09-04 NOTE — ED STATDOCS - DATE/TIME 1
General: Awake, alert and oriented. No acute distress. Well developed, hydrated and nourished. Appears stated age.  Skin: Skin in warm, dry and intact without rashes or lesions. Appropriate color for ethnicity  HENMT: head normocephalic and atraumatic; bilateral external ears without swelling. no nasal discharge. moist oral mucosa. supple neck, trachea midline  EYES: Conjunctiva clear. nonicteric sclera. EOM intact, Eyelids are normal in appearance without swelling or lesions.  Cardiac: well perfused, s1, s2, rrr  Respiratory: breathing comfortably on room air. no audible wheezing or stridor, lungs ctab, no chest wall tenderness to palpation  Abdominal: nondistended, soft, nontender  MSK: Neck and back are without deformity, visible external skin changes, or signs of trauma. Curvature of the cervical, thoracic, and lumbar spine are within normal limits. no external signs of trauma. no apparent deficits in ROM of any extremity. minimal trace leg edema  Neurological: The patient is awake, alert and oriented to person, place, and time with normal speech. CN 2-12 grossly intact. no apparent deficits. Memory is normal and thought process is intact.  Psychiatric: Appropriate mood and affect. Good judgement and insight. 20-Jul-2017 13:33

## 2022-10-26 NOTE — PROGRESS NOTE ADULT - SUBJECTIVE AND OBJECTIVE BOX
ZION LEACH    [id number]    83y      Female    Patient is a 83y old  Female who presents with a chief complaint of Facial droop (27 Jul 2018 16:22)      INTERVAL HPI/OVERNIGHT EVENTS:    She has some improvement of the power  of the right arm, denies any worsening, she denies fever, chills, chest pain, nausea or vomiting.     REVIEW OF SYSTEMS:    CONSTITUTIONAL: No fever, has fatigue  RESPIRATORY: No cough, No shortness of breath  CARDIOVASCULAR: No chest pain, palpitations  GASTROINTESTINAL: No abdominal, No nausea, vomiting  NEUROLOGICAL: right sided weakness   MISCELLANEOUS: No joint swelling or pain       Vital Signs Last 24 Hrs  T(C): 36.4 (29 Jul 2018 07:40), Max: 37.3 (28 Jul 2018 23:37)  T(F): 97.5 (29 Jul 2018 07:40), Max: 99.1 (28 Jul 2018 23:37)  HR: 53 (29 Jul 2018 07:40) (52 - 61)  BP: 142/72 (29 Jul 2018 07:40) (132/62 - 185/90)  RR: 18 (29 Jul 2018 07:40) (16 - 18)  SpO2: 99% (29 Jul 2018 07:40) (98% - 100%)    PHYSICAL EXAM:    GENERAL: Elderly female looking comfortable  HEENT: PERRL, +EOMI, left fascial droop  NECK: soft, Supple, No JVD,   CHEST/LUNG: Clear to auscultate bilaterally; No wheezing  HEART: S1S2+, Regular rate and rhythm; No murmurs  ABDOMEN: Soft, Nontender, Nondistended; Bowel sounds present  EXTREMITIES:  1+ Peripheral Pulses, No edema  SKIN: No rashes or lesions  NEURO: AAOX2, she has 4/5 power on the right side and 5/5 on the left side, sensation is less on the left side, left fascial droop, DTRs are +1.   PSYCH: normal mood      LABS:                        11.7   5.2   )-----------( 267      ( 28 Jul 2018 08:38 )             35.7     07-28    141  |  102  |  12.0  ----------------------------<  200<H>  3.9   |  26.0  |  0.41<L>    Ca    9.5      28 Jul 2018 08:38    TPro  7.4  /  Alb  3.6  /  TBili  0.2<L>  /  DBili  x   /  AST  18  /  ALT  16  /  AlkPhos  92  07-28    PT/INR - ( 28 Jul 2018 08:38 )   PT: 13.0 sec;   INR: 1.18 ratio         PTT - ( 27 Jul 2018 12:35 )  PTT:24.6 sec        I&O's Summary      MEDICATIONS  (STANDING):  amLODIPine   Tablet 10 milliGRAM(s) Oral daily  artificial  tears Solution 1 Drop(s) Both EYES two times a day  aspirin enteric coated 81 milliGRAM(s) Oral daily  cloNIDine 0.2 milliGRAM(s) Oral two times a day  dextrose 5%. 1000 milliLiter(s) (50 mL/Hr) IV Continuous <Continuous>  dextrose 50% Injectable 12.5 Gram(s) IV Push once  dextrose 50% Injectable 25 Gram(s) IV Push once  dextrose 50% Injectable 25 Gram(s) IV Push once  famotidine    Tablet 20 milliGRAM(s) Oral daily  gabapentin 100 milliGRAM(s) Oral three times a day  heparin  Injectable 5000 Unit(s) SubCutaneous every 12 hours  insulin glargine Injectable (LANTUS) 10 Unit(s) SubCutaneous at bedtime  insulin glargine Injectable (LANTUS) 20 Unit(s) SubCutaneous every morning  insulin lispro (HumaLOG) corrective regimen sliding scale   SubCutaneous three times a day before meals  losartan 50 milliGRAM(s) Oral daily  metoprolol tartrate 25 milliGRAM(s) Oral two times a day    MEDICATIONS  (PRN):  acetaminophen   Tablet 650 milliGRAM(s) Oral every 6 hours PRN Pain, fever, headache  dextrose 40% Gel 15 Gram(s) Oral once PRN Blood Glucose LESS THAN 70 milliGRAM(s)/deciliter  glucagon  Injectable 1 milliGRAM(s) IntraMuscular once PRN Glucose LESS THAN 70 milligrams/deciliter High Dose Vitamin A Pregnancy And Lactation Text: High dose vitamin A therapy is contraindicated during pregnancy and breast feeding.

## 2023-04-20 NOTE — PHYSICAL THERAPY INITIAL EVALUATION ADULT - SITTING BALANCE: DYNAMIC
----- Message from Kavya Lee PA-C sent at 4/20/2023  2:48 PM CDT -----  No elevation of the affected arm at the shoulder. She can use her elbows, wrist, and hands for very light activity  No weight bearing      ----- Message -----  From: Cindy Mancini MA  Sent: 4/20/2023  11:54 AM CDT  To: Kavya Lee PA-C    Please advise on range of motion for patient.      ----- Message -----  From: Carmel Aden  Sent: 4/20/2023  10:43 AM CDT  To: Rosa Hoff Staff    Type:  Needs Medical Advice    Who Called:  Christine with [facility]  Symptoms (please be specific):    How long has patient had these symptoms:    Pharmacy name and phone #:    Would the patient rather a call back or a response via [facility]? call  Best Call Back Number: [phone]  Additional Information:  do you want range emotion for the right upper extremity            fair minus

## 2023-09-05 ENCOUNTER — EMERGENCY (EMERGENCY)
Facility: HOSPITAL | Age: 88
LOS: 1 days | Discharge: HOME | End: 2023-09-05
Attending: EMERGENCY MEDICINE
Payer: MEDICARE

## 2023-09-05 VITALS
TEMPERATURE: 98 F | HEART RATE: 55 BPM | OXYGEN SATURATION: 98 % | RESPIRATION RATE: 18 BRPM | SYSTOLIC BLOOD PRESSURE: 153 MMHG | DIASTOLIC BLOOD PRESSURE: 69 MMHG

## 2023-09-05 VITALS
RESPIRATION RATE: 18 BRPM | OXYGEN SATURATION: 98 % | DIASTOLIC BLOOD PRESSURE: 90 MMHG | HEART RATE: 88 BPM | SYSTOLIC BLOOD PRESSURE: 180 MMHG

## 2023-09-05 PROBLEM — I63.9 CEREBRAL INFARCTION, UNSPECIFIED: Chronic | Status: ACTIVE | Noted: 2018-09-12

## 2023-09-05 LAB
ALBUMIN SERPL ELPH-MCNC: 3.4 G/DL — SIGNIFICANT CHANGE UP (ref 3.3–5.2)
ALP SERPL-CCNC: 66 U/L — SIGNIFICANT CHANGE UP (ref 40–120)
ALT FLD-CCNC: 8 U/L — SIGNIFICANT CHANGE UP
ANION GAP SERPL CALC-SCNC: 10 MMOL/L — SIGNIFICANT CHANGE UP (ref 5–17)
ANISOCYTOSIS BLD QL: SIGNIFICANT CHANGE UP
ANISOCYTOSIS BLD QL: SLIGHT — SIGNIFICANT CHANGE UP
AST SERPL-CCNC: 43 U/L — HIGH
BASOPHILS # BLD AUTO: 0 K/UL — SIGNIFICANT CHANGE UP (ref 0–0.2)
BASOPHILS # BLD AUTO: 0 K/UL — SIGNIFICANT CHANGE UP (ref 0–0.2)
BASOPHILS NFR BLD AUTO: 0 % — SIGNIFICANT CHANGE UP (ref 0–2)
BASOPHILS NFR BLD AUTO: 0 % — SIGNIFICANT CHANGE UP (ref 0–2)
BILIRUB SERPL-MCNC: <0.2 MG/DL — LOW (ref 0.4–2)
BLD GP AB SCN SERPL QL: SIGNIFICANT CHANGE UP
BUN SERPL-MCNC: 11.7 MG/DL — SIGNIFICANT CHANGE UP (ref 8–20)
BURR CELLS BLD QL SMEAR: PRESENT — SIGNIFICANT CHANGE UP
CALCIUM SERPL-MCNC: 8.4 MG/DL — SIGNIFICANT CHANGE UP (ref 8.4–10.5)
CHLORIDE SERPL-SCNC: 105 MMOL/L — SIGNIFICANT CHANGE UP (ref 96–108)
CO2 SERPL-SCNC: 23 MMOL/L — SIGNIFICANT CHANGE UP (ref 22–29)
CREAT SERPL-MCNC: 0.42 MG/DL — LOW (ref 0.5–1.3)
EGFR: 94 ML/MIN/1.73M2 — SIGNIFICANT CHANGE UP
EOSINOPHIL # BLD AUTO: 0.23 K/UL — SIGNIFICANT CHANGE UP (ref 0–0.5)
EOSINOPHIL # BLD AUTO: 0.36 K/UL — SIGNIFICANT CHANGE UP (ref 0–0.5)
EOSINOPHIL NFR BLD AUTO: 1.8 % — SIGNIFICANT CHANGE UP (ref 0–6)
EOSINOPHIL NFR BLD AUTO: 4.4 % — SIGNIFICANT CHANGE UP (ref 0–6)
GIANT PLATELETS BLD QL SMEAR: PRESENT — SIGNIFICANT CHANGE UP
GIANT PLATELETS BLD QL SMEAR: PRESENT — SIGNIFICANT CHANGE UP
GLUCOSE SERPL-MCNC: 60 MG/DL — LOW (ref 70–99)
HCT VFR BLD CALC: 21.9 % — LOW (ref 34.5–45)
HCT VFR BLD CALC: 33.4 % — LOW (ref 34.5–45)
HGB BLD-MCNC: 10.8 G/DL — LOW (ref 11.5–15.5)
HGB BLD-MCNC: 6.9 G/DL — CRITICAL LOW (ref 11.5–15.5)
HYPOCHROMIA BLD QL: SIGNIFICANT CHANGE UP
HYPOCHROMIA BLD QL: SLIGHT — SIGNIFICANT CHANGE UP
INR BLD: 1.14 RATIO — SIGNIFICANT CHANGE UP (ref 0.85–1.18)
LYMPHOCYTES # BLD AUTO: 1.44 K/UL — SIGNIFICANT CHANGE UP (ref 1–3.3)
LYMPHOCYTES # BLD AUTO: 11.4 % — LOW (ref 13–44)
LYMPHOCYTES # BLD AUTO: 3.7 K/UL — HIGH (ref 1–3.3)
LYMPHOCYTES # BLD AUTO: 45.1 % — HIGH (ref 13–44)
MACROCYTES BLD QL: SLIGHT — SIGNIFICANT CHANGE UP
MACROCYTES BLD QL: SLIGHT — SIGNIFICANT CHANGE UP
MANUAL SMEAR VERIFICATION: SIGNIFICANT CHANGE UP
MANUAL SMEAR VERIFICATION: SIGNIFICANT CHANGE UP
MCHC RBC-ENTMCNC: 22.4 PG — LOW (ref 27–34)
MCHC RBC-ENTMCNC: 24.3 PG — LOW (ref 27–34)
MCHC RBC-ENTMCNC: 31.5 GM/DL — LOW (ref 32–36)
MCHC RBC-ENTMCNC: 32.3 GM/DL — SIGNIFICANT CHANGE UP (ref 32–36)
MCV RBC AUTO: 71.1 FL — LOW (ref 80–100)
MCV RBC AUTO: 75.1 FL — LOW (ref 80–100)
MICROCYTES BLD QL: SLIGHT — SIGNIFICANT CHANGE UP
MONOCYTES # BLD AUTO: 0.22 K/UL — SIGNIFICANT CHANGE UP (ref 0–0.9)
MONOCYTES # BLD AUTO: 0.77 K/UL — SIGNIFICANT CHANGE UP (ref 0–0.9)
MONOCYTES NFR BLD AUTO: 2.7 % — SIGNIFICANT CHANGE UP (ref 2–14)
MONOCYTES NFR BLD AUTO: 6.1 % — SIGNIFICANT CHANGE UP (ref 2–14)
NEUTROPHILS # BLD AUTO: 3.92 K/UL — SIGNIFICANT CHANGE UP (ref 1.8–7.4)
NEUTROPHILS # BLD AUTO: 9.87 K/UL — HIGH (ref 1.8–7.4)
NEUTROPHILS NFR BLD AUTO: 47.8 % — SIGNIFICANT CHANGE UP (ref 43–77)
NEUTROPHILS NFR BLD AUTO: 78.1 % — HIGH (ref 43–77)
OVALOCYTES BLD QL SMEAR: SLIGHT — SIGNIFICANT CHANGE UP
OVALOCYTES BLD QL SMEAR: SLIGHT — SIGNIFICANT CHANGE UP
PLAT MORPH BLD: NORMAL — SIGNIFICANT CHANGE UP
PLAT MORPH BLD: NORMAL — SIGNIFICANT CHANGE UP
PLATELET # BLD AUTO: 480 K/UL — HIGH (ref 150–400)
PLATELET # BLD AUTO: 529 K/UL — HIGH (ref 150–400)
POIKILOCYTOSIS BLD QL AUTO: SLIGHT — SIGNIFICANT CHANGE UP
POLYCHROMASIA BLD QL SMEAR: SIGNIFICANT CHANGE UP
POLYCHROMASIA BLD QL SMEAR: SIGNIFICANT CHANGE UP
POTASSIUM SERPL-MCNC: 5.2 MMOL/L — SIGNIFICANT CHANGE UP (ref 3.5–5.3)
POTASSIUM SERPL-SCNC: 5.2 MMOL/L — SIGNIFICANT CHANGE UP (ref 3.5–5.3)
PROT SERPL-MCNC: 6.3 G/DL — LOW (ref 6.6–8.7)
PROTHROM AB SERPL-ACNC: 12.6 SEC — SIGNIFICANT CHANGE UP (ref 9.5–13)
RBC # BLD: 3.08 M/UL — LOW (ref 3.8–5.2)
RBC # BLD: 4.45 M/UL — SIGNIFICANT CHANGE UP (ref 3.8–5.2)
RBC # FLD: 21.4 % — HIGH (ref 10.3–14.5)
RBC # FLD: 21.7 % — HIGH (ref 10.3–14.5)
RBC BLD AUTO: ABNORMAL
RBC BLD AUTO: ABNORMAL
ROULEAUX BLD QL SMEAR: PRESENT
SCHISTOCYTES BLD QL AUTO: SLIGHT — SIGNIFICANT CHANGE UP
SCHISTOCYTES BLD QL AUTO: SLIGHT — SIGNIFICANT CHANGE UP
SODIUM SERPL-SCNC: 138 MMOL/L — SIGNIFICANT CHANGE UP (ref 135–145)
TARGETS BLD QL SMEAR: SLIGHT — SIGNIFICANT CHANGE UP
TARGETS BLD QL SMEAR: SLIGHT — SIGNIFICANT CHANGE UP
VARIANT LYMPHS # BLD: 2.6 % — SIGNIFICANT CHANGE UP (ref 0–6)
WBC # BLD: 12.64 K/UL — HIGH (ref 3.8–10.5)
WBC # BLD: 8.2 K/UL — SIGNIFICANT CHANGE UP (ref 3.8–10.5)
WBC # FLD AUTO: 12.64 K/UL — HIGH (ref 3.8–10.5)
WBC # FLD AUTO: 8.2 K/UL — SIGNIFICANT CHANGE UP (ref 3.8–10.5)

## 2023-09-05 PROCEDURE — 99053 MED SERV 10PM-8AM 24 HR FAC: CPT

## 2023-09-05 PROCEDURE — 36430 TRANSFUSION BLD/BLD COMPNT: CPT

## 2023-09-05 PROCEDURE — 82728 ASSAY OF FERRITIN: CPT

## 2023-09-05 PROCEDURE — 83540 ASSAY OF IRON: CPT

## 2023-09-05 PROCEDURE — 85610 PROTHROMBIN TIME: CPT

## 2023-09-05 PROCEDURE — 86850 RBC ANTIBODY SCREEN: CPT

## 2023-09-05 PROCEDURE — P9040: CPT

## 2023-09-05 PROCEDURE — 99285 EMERGENCY DEPT VISIT HI MDM: CPT

## 2023-09-05 PROCEDURE — 99285 EMERGENCY DEPT VISIT HI MDM: CPT | Mod: 25

## 2023-09-05 PROCEDURE — 82962 GLUCOSE BLOOD TEST: CPT

## 2023-09-05 PROCEDURE — 36415 COLL VENOUS BLD VENIPUNCTURE: CPT

## 2023-09-05 PROCEDURE — 96376 TX/PRO/DX INJ SAME DRUG ADON: CPT

## 2023-09-05 PROCEDURE — 86900 BLOOD TYPING SEROLOGIC ABO: CPT

## 2023-09-05 PROCEDURE — 80053 COMPREHEN METABOLIC PANEL: CPT

## 2023-09-05 PROCEDURE — 86901 BLOOD TYPING SEROLOGIC RH(D): CPT

## 2023-09-05 PROCEDURE — 96374 THER/PROPH/DIAG INJ IV PUSH: CPT

## 2023-09-05 PROCEDURE — 85025 COMPLETE CBC W/AUTO DIFF WBC: CPT

## 2023-09-05 PROCEDURE — 86923 COMPATIBILITY TEST ELECTRIC: CPT

## 2023-09-05 PROCEDURE — 83550 IRON BINDING TEST: CPT

## 2023-09-05 PROCEDURE — 84466 ASSAY OF TRANSFERRIN: CPT

## 2023-09-05 RX ORDER — MORPHINE SULFATE 50 MG/1
2 CAPSULE, EXTENDED RELEASE ORAL ONCE
Refills: 0 | Status: DISCONTINUED | OUTPATIENT
Start: 2023-09-05 | End: 2023-09-05

## 2023-09-05 RX ORDER — MORPHINE SULFATE 50 MG/1
4 CAPSULE, EXTENDED RELEASE ORAL ONCE
Refills: 0 | Status: DISCONTINUED | OUTPATIENT
Start: 2023-09-05 | End: 2023-09-05

## 2023-09-05 RX ADMIN — MORPHINE SULFATE 2 MILLIGRAM(S): 50 CAPSULE, EXTENDED RELEASE ORAL at 13:40

## 2023-09-05 RX ADMIN — MORPHINE SULFATE 4 MILLIGRAM(S): 50 CAPSULE, EXTENDED RELEASE ORAL at 08:19

## 2023-09-05 RX ADMIN — MORPHINE SULFATE 4 MILLIGRAM(S): 50 CAPSULE, EXTENDED RELEASE ORAL at 06:51

## 2023-09-05 NOTE — ED ADULT TRIAGE NOTE - MODE OF ARRIVAL
EMS 34 y/o M w/ PMH of asthma, HLD, GERD, hypothyroid, mood disorder, autism BPH presenting w/ hematuria. Seen w/ aide. Reports noticed blood with urination for the past day. Associated w/ hematuria. Denies ever happening before. No increased frequency or urgency. No pain meds at home. Denies fevers, chills, headache, dizziness, blurred vision, chest pain, cough, shortness of breath, abdominal pain, n/v/d/c, MSK pain, rash. 34 y/o M w/ PMH of asthma, HLD, GERD, hypothyroid, mood disorder, autism BPH presenting w/ hematuria. Seen w/ aide. Reports noticed blood with urination for the past day. Associated w/ dysuria. Denies ever happening before. No increased frequency or urgency. No pain meds at home. Denies fevers, chills, headache, dizziness, blurred vision, chest pain, cough, shortness of breath, abdominal pain, n/v/d/c, MSK pain, rash. Ambulance

## 2023-09-05 NOTE — ED ADULT NURSE NOTE - NSFALLHARMRISKINTERV_ED_ALL_ED

## 2023-09-05 NOTE — ED ADULT NURSE NOTE - NS ED NOTE ABUSE RESPONSE YN
0713-Bedside and Verbal shift change report received from  Gerber ,[facility] (off going nurse). Report included the following information SBAR, Kardex, MAR and Recent Results. Assumed care,fall prevention program maintained,call bell and bedside table within reach. Will continue care. Yes

## 2023-09-05 NOTE — DISCHARGE NOTE NURSING/CASE MANAGEMENT/SOCIAL WORK - NSDCCRNAME_GEN_ALL_CORE_FT
PATIENT WILL BE TRANSFERRED BACK TO HER LONG TERM SKILLED NURSING FACILTIY AT   [facility] LOCATED AT [address] [place] [address] VIA   AMBULANCE TODAY AT 1:00 PM

## 2023-09-05 NOTE — ED PROVIDER NOTE - NSICDXFAMILYHX_GEN_ALL_CORE_FT
FAMILY HISTORY:  No pertinent family history in first degree relatives, No known hx of HTN or DM

## 2023-09-05 NOTE — ED PROVIDER NOTE - NSFOLLOWUPINSTRUCTIONS_ED_ALL_ED_FT
Anemia is a condition in which there is not enough red blood cells or hemoglobin in the blood. Hemoglobin is a substance in red blood cells that carries oxygen.    When you do not have enough red blood cells or hemoglobin (are anemic), your body cannot get enough oxygen and your organs may not work properly. As a result, you may feel very tired or have other problems.    What are the causes?  Common causes of anemia include:    Excessive bleeding. Anemia can be caused by excessive bleeding inside or outside the body, including bleeding from the intestines or from heavy menstrual periods in females.  Poor nutrition.  Long-lasting (chronic) kidney, thyroid, and liver disease.  Bone marrow disorders, spleen problems, and blood disorders.  Cancer and treatments for cancer.  HIV (human immunodeficiency virus) and AIDS (acquired immunodeficiency syndrome).  Infections, medicines, and autoimmune disorders that destroy red blood cells.    What are the signs or symptoms?  Symptoms of this condition include:    Minor weakness.  Dizziness.  Headache, or difficulties concentrating and sleeping.  Heartbeats that feel irregular or faster than normal (palpitations).  Shortness of breath, especially with exercise.  Pale skin, lips, and nails, or cold hands and feet.  Indigestion and nausea.    Symptoms may occur suddenly or develop slowly. If your anemia is mild, you may not have symptoms.    How is this diagnosed?  This condition is diagnosed based on blood tests, your medical history, and a physical exam. In some cases, a test may be needed in which cells are removed from the soft tissue inside of a bone and looked at under a microscope (bone marrow biopsy). Your health care provider may also check your stool (feces) for blood and may do additional testing to look for the cause of your bleeding.    Other tests may include:    Imaging tests, such as a CT scan or MRI.  A procedure to see inside your esophagus and stomach (endoscopy).  A procedure to see inside your colon and rectum (colonoscopy).    How is this treated?  Treatment for this condition depends on the cause. If you continue to lose a lot of blood, you may need to be treated at a hospital. Treatment may include:    Taking supplements of iron, vitamin B12, or folic acid.  Taking a hormone medicine (erythropoietin) that can help to stimulate red blood cell growth.  Having a blood transfusion. This may be needed if you lose a lot of blood.  Making changes to your diet.  Having surgery to remove your spleen.    Follow these instructions at home:  Take over-the-counter and prescription medicines only as told by your health care provider.  Take supplements only as told by your health care provider.  Follow any diet instructions that you were given by your health care provider.  Keep all follow-up visits as told by your health care provider. This is important.    Contact a health care provider if:  You develop new bleeding anywhere in the body.    Get help right away if:  You are very weak.  You are short of breath.  You have pain in your abdomen or chest.  You are dizzy or feel faint.  You have trouble concentrating.  You have bloody stools, black stools, or tarry stools.  You vomit repeatedly or you vomit up blood.    These symptoms may represent a serious problem that is an emergency. Do not wait to see if the symptoms will go away. Get medical help right away. Call your local emergency services (911 in the [place] Do not drive yourself to the hospital.    Summary  Anemia is a condition in which you do not have enough red blood cells or enough of a substance in your red blood cells that carries oxygen (hemoglobin).  Symptoms may occur suddenly or develop slowly.  If your anemia is mild, you may not have symptoms.  This condition is diagnosed with blood tests, a medical history, and a physical exam. Other tests may be needed.  Treatment for this condition depends on the cause of the anemia.    ADDITIONAL NOTES AND INSTRUCTIONS    Please follow up with your Primary MD in 24-48 hr.  Seek immediate medical care for any new/worsening signs or symptoms.

## 2023-09-05 NOTE — DISCHARGE NOTE NURSING/CASE MANAGEMENT/SOCIAL WORK - PATIENT PORTAL LINK FT
You can access the FollowMyHealth Patient Portal offered by [facility] by registering at the following website: [email or web address]. By joining [employer]’s FollowMyHealth portal, you will also be able to view your health information using other applications (apps) compatible with our system.

## 2023-09-05 NOTE — ED PROVIDER NOTE - CLINICAL SUMMARY MEDICAL DECISION MAKING FREE TEXT BOX
ASSESSMENT:   ZION LEACH is a 89yo F who presented with low hemoglobin on outside labs. No evidence of bleeding on history. Vitals stable. Exam benign.     PLAN: Check hemoglobin and transfuse as appropriate.

## 2023-09-05 NOTE — ED PROVIDER NOTE - PATIENT PORTAL LINK FT
You can access the FollowMyHealth Patient Portal offered by [facility] by registering at the following website: [email or web address]. By joining [employer]’s FollowMyHealth portal, you will also be able to view your health information using other applications (apps) compatible with our system.

## 2023-09-05 NOTE — ED ADULT NURSE NOTE - PRO INTERPRETER NEED 2
General: In NAD, non-toxic appearing; well nourished/developed.  Head: Normocephalic/atraumatic.   Neck: Supple, FROM. No spinal tenderness.   Cardio: No lifts, heaves, visible pulsations. No thrills. Rate and rhythm regular, S1 & S2 clear. No audible murmur, gallop, or rub.  PV: Pulses: b/l 2+ DP, PT. Capillary refill <2 seconds.  Resp: Normal AP to lateral diameter, symmetrical excursion b/l. Breath sounds vesicular, symmetrical and without rales, rhonchi or wheezing b/l.  Skin/MSK/Neuro: A&Ox3. Ecchymosis and swelling noted to right lateral malleolus, no deformity. TTP over right lateral malleolus. Sensation intact. Unable to bare weight.
[place]

## 2023-09-05 NOTE — ED ADULT NURSE REASSESSMENT NOTE - NS ED NURSE REASSESS COMMENT FT1
pt c/o severe pain, screaming out incoherently. pt seen and assessed at bedside with . md notified, stat morphine ordered and given, with fast pain relief.

## 2023-09-05 NOTE — CHART NOTE - NSCHARTNOTEFT_GEN_A_CORE
SOCIAL WORK NOTE:  PER ED MD- PATIENT IS MEDICALLY CLEARED FOR TRANSFER BACK TO [facility]. \[facility].\"" SNF TODAY. CALLED EARLINE AT [facility]. \[facility]ospital [facility] Outlook.\"" ADMISSIONS AND CONFIRMED PATIENT IS FROM THERE LONG TERM. EARLINE REVIEWED PAPERWORK AND CONFIRMED PATIENT CAN RETURN. AMBULANCE ARRANGED FOR 1PM TRANSPORT. PLACED CALL TO HER SON- HAI GARCIA # [phone] AND MADE HIM AWARE REGARDING TRANSFER AND RETURN TODAY. APPRISED HIM OF AMBULANCE BILLING LETTER. IN AGREEMENT FOR PATIENT TO RETURN. NO MOLST FORM ON CHART TO SEND BACK WITH PATIENT. SNF PACKET AND MARINA PLACED ON DC RACK.

## 2023-09-05 NOTE — ED ADULT NURSE NOTE - OBJECTIVE STATEMENT
Pt received at 0300. Pt is A&O x 2, VSS, breathing even and unlabored. Pt sent from NH due to abnormal labs-Hg 6.3. Awaiting POC.

## 2023-09-05 NOTE — ED ADULT TRIAGE NOTE - CHIEF COMPLAINT QUOTE
BIBEMS from [place] for abnormal hemoglobin result of 6.3 as found on her lab work that resulted yesterday at 1400. Patient offers no complaints, Finnish speaking. Denies CP/SOB.

## 2023-09-05 NOTE — ED PROVIDER NOTE - PROGRESS NOTE DETAILS
Mary: Attempted to call emergency contact. No answer. AJM: pt feeling well. No bleeding noted. appropriately improved h/h s/p transfusion. stable for dc back to facility Spoke with Anna at [facility] ([phone]) for signout. she is comfortable accepting pt back to facility

## 2023-09-05 NOTE — DISCHARGE NOTE NURSING/CASE MANAGEMENT/SOCIAL WORK - NSDCPEFALRISK_GEN_ALL_CORE
For information on Fall & Injury Prevention, visit: [email or web address] OR  [email or web address] OR  https://www.cdc.gov/steadi/patient.html

## 2023-09-05 NOTE — ED ADULT NURSE NOTE - CHIEF COMPLAINT QUOTE
BIBEMS from [place] for abnormal hemoglobin result of 6.3 as found on her lab work that resulted yesterday at 1400. Patient offers no complaints, Citizen of [place] speaking. Denies CP/SOB.

## 2023-09-05 NOTE — ED PROVIDER NOTE - OBJECTIVE STATEMENT
ZION LEACH is a 89yo Female with PMH HTN, HLD, DM, GERD, CVA who was BIBEMS from [facility] after she was noted to be anemic on labs drawn yesterday. Stated HGB 6.5. On arrival pt awake, alert and oriented to person. Offers no complaints. Denies bleeding from rectum, vagina, blood in urine. PT denies any symptoms of cp/sob, fevers, chills, nausea, vomiting, abdominal pain, urinary symptoms, weakness, confusion. Of note PT is not considered a reliable historian.

## 2023-09-05 NOTE — ED PROVIDER NOTE - PHYSICAL EXAMINATION
Gen: Well appearing in NAD  Head: NC/AT  Neck: trachea midline  Resp:  No distress  Abd: soft, nd, nt  Ext: no deformities  Neuro:  A&O appears non focal  Skin:  Warm and dry as visualized  Psych:  Normal affect and mood

## 2023-09-05 NOTE — ED PROVIDER NOTE - NS ED ROS FT
Review of Systems  SKIN: warm, dry w/ no rash or bleeding  RESPIRATORY: no cough or SOB  CARDIAC: no chest pain & no palpitations  GI: no abd pain, nausea, vomiting, diarrhea  MUSCULOSKELETAL:  no joint pain, swelling or redness  NEURO: Alert, oriented x3. No weakness, numbness.

## 2023-09-05 NOTE — ED PROVIDER NOTE - NSICDXPASTMEDICALHX_GEN_ALL_CORE_FT
PAST MEDICAL HISTORY:  CVA (cerebral vascular accident)     Diabetes mellitus     GERD (gastroesophageal reflux disease)     High cholesterol     Hypertension

## 2023-09-05 NOTE — DISCHARGE NOTE NURSING/CASE MANAGEMENT/SOCIAL WORK - NSDCCRNUMBER_GEN_ALL_CORE_FT
[phone]
Normal vision: sees adequately in most situations; can see medication labels, newsprint

## 2023-09-05 NOTE — ED ADULT NURSE REASSESSMENT NOTE - NS ED NURSE REASSESS COMMENT FT1
Received report and assumed pt care at 0730.  Per report, Via  who just left the bedside, baseline mental status A+O X2 c/o bilateral knee pain.  At time of assessment, pt is resting comfortably with even, unlabored respirations.  Awake and cooperative. Oxygen saturation 100% on room air. Heel booties in place.  PRBCs infusing well through patent IV catheter in left forearm.  External urinary drainage catheter in place, [place] area clean and dry with no breakdown observed.  Awaiting inpatient bed.  Side rails up.

## 2023-12-09 NOTE — ED ADULT TRIAGE NOTE - GLASGOW COMA SCALE: BEST MOTOR RESPONSE, MLM
(M6) obeys commands
PAST MEDICAL HISTORY:  Constipation, unspecified constipation type     Dysmenorrhea, unspecified     Excessive and frequent menstruation with regular cycle     Problems related to multiparity     Thyroid nodule Currently Monitoring    Vitamin D deficiency, unspecified

## 2024-05-07 NOTE — DISCHARGE NOTE ADULT - NS MD DC FALL RISK RISK
no For information on Fall & Injury Prevention, visit [email or web address]

## 2025-01-01 ENCOUNTER — EMERGENCY (EMERGENCY)
Facility: HOSPITAL | Age: 89
LOS: 1 days | End: 2025-01-01
Attending: STUDENT IN AN ORGANIZED HEALTH CARE EDUCATION/TRAINING PROGRAM
Payer: MEDICARE

## 2025-01-01 VITALS
WEIGHT: 119.93 LBS | TEMPERATURE: 106 F | SYSTOLIC BLOOD PRESSURE: 125 MMHG | RESPIRATION RATE: 30 BRPM | DIASTOLIC BLOOD PRESSURE: 87 MMHG | HEART RATE: 114 BPM

## 2025-01-01 PROCEDURE — 71045 X-RAY EXAM CHEST 1 VIEW: CPT

## 2025-01-01 PROCEDURE — 92950 HEART/LUNG RESUSCITATION CPR: CPT

## 2025-01-01 PROCEDURE — 99285 EMERGENCY DEPT VISIT HI MDM: CPT | Mod: 25,GC

## 2025-01-01 PROCEDURE — 82962 GLUCOSE BLOOD TEST: CPT

## 2025-01-01 PROCEDURE — 71045 X-RAY EXAM CHEST 1 VIEW: CPT | Mod: 26

## 2025-01-01 PROCEDURE — 36680 INSERT NEEDLE BONE CAVITY: CPT | Mod: GC

## 2025-01-01 PROCEDURE — 99291 CRITICAL CARE FIRST HOUR: CPT | Mod: 25

## 2025-01-01 RX ORDER — SODIUM CHLORIDE 9 MG/ML
1700 INJECTION, SOLUTION INTRAMUSCULAR; INTRAVENOUS; SUBCUTANEOUS ONCE
Refills: 0 | Status: ACTIVE | OUTPATIENT
Start: 2025-01-01 | End: 2025-01-01

## 2025-01-01 RX ORDER — ACETAMINOPHEN 80 MG/.8ML
1000 SOLUTION/ DROPS ORAL ONCE
Refills: 0 | Status: ACTIVE | OUTPATIENT
Start: 2025-01-01 | End: 2025-01-01

## 2025-01-01 RX ORDER — VANCOMYCIN HYDROCHLORIDE 5 G/100ML
1000 INJECTION, POWDER, LYOPHILIZED, FOR SOLUTION INTRAVENOUS ONCE
Refills: 0 | Status: ACTIVE | OUTPATIENT
Start: 2025-01-01 | End: 2025-01-01

## 2025-01-01 RX ORDER — PIPERACILLIN AND TAZOBACTAM 3; .375 G/15ML; G/15ML
3.38 INJECTION, POWDER, LYOPHILIZED, FOR SOLUTION INTRAVENOUS ONCE
Refills: 0 | Status: ACTIVE | OUTPATIENT
Start: 2025-01-01 | End: 2025-01-01

## 2025-01-23 NOTE — ED PROVIDER NOTE - PROGRESS NOTE DETAILS
Enrique: Still unable to reach family despite multiple attempts. Spoke with staff at [facility], who will also try to reach out.

## 2025-01-23 NOTE — ED ADULT NURSE NOTE - CHIEF COMPLAINT QUOTE
BIBA from [place] for respiratory distress, bilateral crackles, unresponsive.  Pt placed on NRB 15L, hypotensive, tachy, tachypneic.  DNR/DNI.  FS "High" in ED.  Code Sepsis.

## 2025-01-23 NOTE — ED PROVIDER NOTE - CARE PLAN
1 Principal Discharge DX:	Cardiac arrest   Principal Discharge DX:	Cardiac arrest  Secondary Diagnosis:	Sepsis, unspecified organism  Secondary Diagnosis:	Hyperglycemia

## 2025-01-23 NOTE — ED PROVIDER NOTE - CLINICAL SUMMARY MEDICAL DECISION MAKING FREE TEXT BOX
On arrival difficult to obtain pulse ox read, tachycardic 120s, febrile 106. hypotensive MAPs 46. Concern for HHS in setting of septic shock with unknown etiology. Patient DNR, DNI. Difficult to obtain IV access, IO placed given risk of decompensation. IVF started. Not tolerating BiPAP given AMS. 15L non rebreather continued however patient decompensated further into bradycardia 40s. Given 1mg atropine, 2mg calcium chloride. Despite resuscitative efforts patient became apneic with rhythm on the monitor asystole. No heart activity on bedside US. Time of death 06.52.

## 2025-01-23 NOTE — ED PROVIDER NOTE - PHYSICAL EXAMINATION
Gen: AOx0  Head: NCAT  HEENT: dry oral mucosa  Lung: CTAB, no respiratory distress  CV: tachycardic, poor perfusion  Abd: soft, NTND  MSK: No edema, no visible deformities  Neuro: unable to assess due to mental status  Skin: No rash

## 2025-01-23 NOTE — ED PROVIDER NOTE - CRITICAL CARE ATTENDING CONTRIBUTION TO CARE
89y F w/ hx HTN, DMT2, GERD and CVA (bedbound at baseline); presents from [facility] for AMS. Per EMS, pt was found obtunded by staff and in respiratory distress. Pt unable to provide any history due to AMS. Collateral history obtained from nursing home staff -- pt normally awake and alert; was noted to be hyperglycemic to > 600 yesterday. Also reportedly had a fever 2 days ago. Pt is DNR/DNI as per signed MOLST.    On my exam, pt obtunded and not following commands. Heart tachycardic, +diffuse crackles on lung exam. Abdomen soft and nontender. +Stage II sacral ulcer. Noted to have rectal temp 106.3. Fingerstick reading "high." Sepsis workup initiated. Before medications could be given, pt became bradycardic with agonal breathing. IO placed and calcium/bicarb/atropine ordered. Pt remained pulseless despite interventions. Pronounced dead at 0652. Nursing home staff notified. Attempted to reach family; messages left on voicemail. 89y F w/ hx HTN, DMT2, GERD and CVA (bedbound at baseline); presents from [facility] for AMS. Per EMS, pt was found obtunded by staff and in respiratory distress. Pt unable to provide any history due to AMS. Placed on 15 L NRB by EMS. Collateral history obtained from nursing home staff -- pt normally awake and alert; was noted to be hyperglycemic to > 600 yesterday. Also reportedly had a fever 2 days ago. Pt is DNR/DNI as per signed MOLST.    On my exam, pt obtunded and not following commands. +Dry oral mucosa. Heart tachycardic, +diffuse crackles on lung exam. Abdomen soft and nontender. +Stage II sacral ulcer. Noted to have rectal temp 106.3. Fingerstick reading "high." Unable to get accurate O2 saturation. Sepsis workup initiated. Before medications could be given, pt became bradycardic with agonal breathing. IO placed and calcium/bicarb/atropine ordered. Pt remained pulseless despite interventions. Pronounced dead at 0652. Nursing home staff notified. Attempted to reach family; messages left on voicemail.

## 2025-01-23 NOTE — ED PROVIDER NOTE - OBJECTIVE STATEMENT
89 year old female w/PMHx CHF, DM BIBEMS from [facility] with SOB. As per EMS found hypoxic to 70% on RA placed on a non rebreather with difficult to obtain pulse ox reading. As per staff at [facility] patient today with new onset confusion. At baseline Axo2 however today has only been ware to self. Fingerstick at NH reading "high". No further history provided.

## 2025-01-23 NOTE — ED ADULT NURSE NOTE - OBJECTIVE STATEMENT
Pt. BIBEMS with low SpO2 from nursing home, unresponsive. As per EMS and NH, pt. is usually A+Ox4 and on RA. Pt. not responsive to verbal stimuli, upon assessment pt. lost pulse, MD at bedside. Pt. DNR/DNI as per MOLST from NH. TOD 0628.

## 2025-01-23 NOTE — ED ADULT TRIAGE NOTE - CHIEF COMPLAINT QUOTE
BIBA from [place] for respiratory distress, bilateral crackles, unresponsive.  Pt placed on NRB 15L, hypotensive, tachy, tachypneic.  DNR/DNI.  FS "High" in ED BIBA from [place] for respiratory distress, bilateral crackles, unresponsive.  Pt placed on NRB 15L, hypotensive, tachy, tachypneic.  DNR/DNI.  FS "High" in ED.  Code Sepsis.